# Patient Record
Sex: MALE | Race: WHITE | Employment: OTHER | URBAN - METROPOLITAN AREA
[De-identification: names, ages, dates, MRNs, and addresses within clinical notes are randomized per-mention and may not be internally consistent; named-entity substitution may affect disease eponyms.]

---

## 2017-05-06 PROBLEM — G45.9 TIA (TRANSIENT ISCHEMIC ATTACK): Status: ACTIVE | Noted: 2017-05-06

## 2019-03-23 ENCOUNTER — APPOINTMENT (OUTPATIENT)
Dept: GENERAL RADIOLOGY | Age: 84
DRG: 481 | End: 2019-03-23
Payer: MEDICARE

## 2019-03-23 ENCOUNTER — HOSPITAL ENCOUNTER (INPATIENT)
Age: 84
LOS: 3 days | Discharge: NURSING, REHAB OR LONG-TERM CARE | DRG: 481 | End: 2019-03-27
Attending: FAMILY MEDICINE | Admitting: INTERNAL MEDICINE
Payer: MEDICARE

## 2019-03-23 DIAGNOSIS — R79.1 SUPRATHERAPEUTIC INR: ICD-10-CM

## 2019-03-23 DIAGNOSIS — F41.9 ANXIETY: ICD-10-CM

## 2019-03-23 DIAGNOSIS — S72.002A CLOSED FRACTURE OF LEFT HIP, INITIAL ENCOUNTER (HCC): Primary | ICD-10-CM

## 2019-03-23 LAB
A/G RATIO: 1.8 (ref 1.1–2.2)
ALBUMIN SERPL-MCNC: 4.2 G/DL (ref 3.4–5)
ALP BLD-CCNC: 110 U/L (ref 40–129)
ALT SERPL-CCNC: 22 U/L (ref 10–40)
ANION GAP SERPL CALCULATED.3IONS-SCNC: 18 MMOL/L (ref 3–16)
AST SERPL-CCNC: 34 U/L (ref 15–37)
BASOPHILS ABSOLUTE: 0 K/UL (ref 0–0.2)
BASOPHILS RELATIVE PERCENT: 0.7 %
BILIRUB SERPL-MCNC: 0.7 MG/DL (ref 0–1)
BUN BLDV-MCNC: 18 MG/DL (ref 7–20)
CALCIUM SERPL-MCNC: 9.2 MG/DL (ref 8.3–10.6)
CHLORIDE BLD-SCNC: 93 MMOL/L (ref 99–110)
CO2: 24 MMOL/L (ref 21–32)
CREAT SERPL-MCNC: 1.2 MG/DL (ref 0.8–1.3)
EOSINOPHILS ABSOLUTE: 0.3 K/UL (ref 0–0.6)
EOSINOPHILS RELATIVE PERCENT: 3.7 %
GFR AFRICAN AMERICAN: >60
GFR NON-AFRICAN AMERICAN: 58
GLOBULIN: 2.3 G/DL
GLUCOSE BLD-MCNC: 122 MG/DL (ref 70–99)
HCT VFR BLD CALC: 34.5 % (ref 40.5–52.5)
HEMOGLOBIN: 11.8 G/DL (ref 13.5–17.5)
INR BLD: 3.6 (ref 0.86–1.14)
LYMPHOCYTES ABSOLUTE: 0.6 K/UL (ref 1–5.1)
LYMPHOCYTES RELATIVE PERCENT: 8.9 %
MCH RBC QN AUTO: 30.6 PG (ref 26–34)
MCHC RBC AUTO-ENTMCNC: 34.3 G/DL (ref 31–36)
MCV RBC AUTO: 89.1 FL (ref 80–100)
MONOCYTES ABSOLUTE: 0.5 K/UL (ref 0–1.3)
MONOCYTES RELATIVE PERCENT: 7.5 %
NEUTROPHILS ABSOLUTE: 5.5 K/UL (ref 1.7–7.7)
NEUTROPHILS RELATIVE PERCENT: 79.2 %
PDW BLD-RTO: 14.3 % (ref 12.4–15.4)
PLATELET # BLD: 111 K/UL (ref 135–450)
PMV BLD AUTO: 8.7 FL (ref 5–10.5)
POTASSIUM REFLEX MAGNESIUM: 4.1 MMOL/L (ref 3.5–5.1)
PROTHROMBIN TIME: 41 SEC (ref 9.8–13)
RBC # BLD: 3.88 M/UL (ref 4.2–5.9)
SODIUM BLD-SCNC: 135 MMOL/L (ref 136–145)
TOTAL PROTEIN: 6.5 G/DL (ref 6.4–8.2)
WBC # BLD: 6.9 K/UL (ref 4–11)

## 2019-03-23 PROCEDURE — 86901 BLOOD TYPING SEROLOGIC RH(D): CPT

## 2019-03-23 PROCEDURE — 99285 EMERGENCY DEPT VISIT HI MDM: CPT

## 2019-03-23 PROCEDURE — 85610 PROTHROMBIN TIME: CPT

## 2019-03-23 PROCEDURE — 93005 ELECTROCARDIOGRAM TRACING: CPT | Performed by: PHYSICIAN ASSISTANT

## 2019-03-23 PROCEDURE — 6360000002 HC RX W HCPCS: Performed by: PHYSICIAN ASSISTANT

## 2019-03-23 PROCEDURE — 71045 X-RAY EXAM CHEST 1 VIEW: CPT

## 2019-03-23 PROCEDURE — 73502 X-RAY EXAM HIP UNI 2-3 VIEWS: CPT

## 2019-03-23 PROCEDURE — 96374 THER/PROPH/DIAG INJ IV PUSH: CPT

## 2019-03-23 PROCEDURE — 86850 RBC ANTIBODY SCREEN: CPT

## 2019-03-23 PROCEDURE — 86900 BLOOD TYPING SEROLOGIC ABO: CPT

## 2019-03-23 PROCEDURE — 85025 COMPLETE CBC W/AUTO DIFF WBC: CPT

## 2019-03-23 PROCEDURE — 80053 COMPREHEN METABOLIC PANEL: CPT

## 2019-03-23 RX ORDER — FENTANYL CITRATE 50 UG/ML
25 INJECTION, SOLUTION INTRAMUSCULAR; INTRAVENOUS ONCE
Status: COMPLETED | OUTPATIENT
Start: 2019-03-23 | End: 2019-03-23

## 2019-03-23 RX ADMIN — FENTANYL CITRATE 25 MCG: 50 INJECTION, SOLUTION INTRAMUSCULAR; INTRAVENOUS at 22:25

## 2019-03-23 ASSESSMENT — PAIN DESCRIPTION - ONSET
ONSET: SUDDEN
ONSET: ON-GOING
ONSET: ON-GOING

## 2019-03-23 ASSESSMENT — PAIN DESCRIPTION - PAIN TYPE
TYPE: ACUTE PAIN

## 2019-03-23 ASSESSMENT — PAIN DESCRIPTION - LOCATION
LOCATION: GROIN

## 2019-03-23 ASSESSMENT — PAIN DESCRIPTION - DESCRIPTORS
DESCRIPTORS: DISCOMFORT
DESCRIPTORS: DISCOMFORT;SORE
DESCRIPTORS: ACHING;DISCOMFORT

## 2019-03-23 ASSESSMENT — PAIN - FUNCTIONAL ASSESSMENT
PAIN_FUNCTIONAL_ASSESSMENT: PREVENTS OR INTERFERES SOME ACTIVE ACTIVITIES AND ADLS

## 2019-03-23 ASSESSMENT — PAIN DESCRIPTION - PROGRESSION
CLINICAL_PROGRESSION: GRADUALLY IMPROVING
CLINICAL_PROGRESSION: NOT CHANGED
CLINICAL_PROGRESSION: GRADUALLY WORSENING

## 2019-03-23 ASSESSMENT — PAIN DESCRIPTION - ORIENTATION
ORIENTATION: LEFT

## 2019-03-23 ASSESSMENT — PAIN DESCRIPTION - FREQUENCY
FREQUENCY: CONTINUOUS

## 2019-03-23 ASSESSMENT — PAIN SCALES - GENERAL
PAINLEVEL_OUTOF10: 9
PAINLEVEL_OUTOF10: 10
PAINLEVEL_OUTOF10: 4

## 2019-03-24 PROBLEM — I48.91 ATRIAL FIBRILLATION (HCC): Status: ACTIVE | Noted: 2019-03-24

## 2019-03-24 PROBLEM — E11.9 DMII (DIABETES MELLITUS, TYPE 2) (HCC): Status: ACTIVE | Noted: 2019-03-24

## 2019-03-24 PROBLEM — I10 ESSENTIAL HYPERTENSION: Status: ACTIVE | Noted: 2019-03-24

## 2019-03-24 PROBLEM — S72.002A HIP FRACTURE REQUIRING OPERATIVE REPAIR, LEFT, CLOSED, INITIAL ENCOUNTER (HCC): Status: ACTIVE | Noted: 2019-03-24

## 2019-03-24 PROBLEM — M25.552 HIP PAIN, ACUTE, LEFT: Status: ACTIVE | Noted: 2019-03-24

## 2019-03-24 PROBLEM — E78.5 HYPERLIPIDEMIA: Status: ACTIVE | Noted: 2019-03-24

## 2019-03-24 LAB
ABO/RH: NORMAL
ANION GAP SERPL CALCULATED.3IONS-SCNC: 13 MMOL/L (ref 3–16)
ANTIBODY SCREEN: NORMAL
BASOPHILS ABSOLUTE: 0.1 K/UL (ref 0–0.2)
BASOPHILS RELATIVE PERCENT: 1 %
BUN BLDV-MCNC: 16 MG/DL (ref 7–20)
CALCIUM SERPL-MCNC: 9.1 MG/DL (ref 8.3–10.6)
CHLORIDE BLD-SCNC: 95 MMOL/L (ref 99–110)
CO2: 25 MMOL/L (ref 21–32)
CREAT SERPL-MCNC: 1.1 MG/DL (ref 0.8–1.3)
EOSINOPHILS ABSOLUTE: 0.4 K/UL (ref 0–0.6)
EOSINOPHILS RELATIVE PERCENT: 7.9 %
GFR AFRICAN AMERICAN: >60
GFR NON-AFRICAN AMERICAN: >60
GLUCOSE BLD-MCNC: 117 MG/DL (ref 70–99)
HCT VFR BLD CALC: 34 % (ref 40.5–52.5)
HEMOGLOBIN: 11.8 G/DL (ref 13.5–17.5)
INR BLD: 2.23 (ref 0.86–1.14)
INR BLD: 3.6 (ref 0.86–1.14)
LYMPHOCYTES ABSOLUTE: 0.9 K/UL (ref 1–5.1)
LYMPHOCYTES RELATIVE PERCENT: 15.2 %
MCH RBC QN AUTO: 30.3 PG (ref 26–34)
MCHC RBC AUTO-ENTMCNC: 34.7 G/DL (ref 31–36)
MCV RBC AUTO: 87.4 FL (ref 80–100)
MONOCYTES ABSOLUTE: 0.6 K/UL (ref 0–1.3)
MONOCYTES RELATIVE PERCENT: 9.9 %
NEUTROPHILS ABSOLUTE: 3.7 K/UL (ref 1.7–7.7)
NEUTROPHILS RELATIVE PERCENT: 66 %
PDW BLD-RTO: 14.3 % (ref 12.4–15.4)
PLATELET # BLD: 102 K/UL (ref 135–450)
PMV BLD AUTO: 8.9 FL (ref 5–10.5)
POTASSIUM REFLEX MAGNESIUM: 3.9 MMOL/L (ref 3.5–5.1)
PROTHROMBIN TIME: 25.4 SEC (ref 9.8–13)
PROTHROMBIN TIME: 41 SEC (ref 9.8–13)
RBC # BLD: 3.89 M/UL (ref 4.2–5.9)
SODIUM BLD-SCNC: 133 MMOL/L (ref 136–145)
WBC # BLD: 5.7 K/UL (ref 4–11)

## 2019-03-24 PROCEDURE — 2580000003 HC RX 258: Performed by: ORTHOPAEDIC SURGERY

## 2019-03-24 PROCEDURE — 94760 N-INVAS EAR/PLS OXIMETRY 1: CPT

## 2019-03-24 PROCEDURE — 36415 COLL VENOUS BLD VENIPUNCTURE: CPT

## 2019-03-24 PROCEDURE — 1200000000 HC SEMI PRIVATE

## 2019-03-24 PROCEDURE — 6360000002 HC RX W HCPCS: Performed by: ORTHOPAEDIC SURGERY

## 2019-03-24 PROCEDURE — 96375 TX/PRO/DX INJ NEW DRUG ADDON: CPT

## 2019-03-24 PROCEDURE — 85025 COMPLETE CBC W/AUTO DIFF WBC: CPT

## 2019-03-24 PROCEDURE — 6370000000 HC RX 637 (ALT 250 FOR IP): Performed by: INTERNAL MEDICINE

## 2019-03-24 PROCEDURE — 2580000003 HC RX 258: Performed by: INTERNAL MEDICINE

## 2019-03-24 PROCEDURE — 6360000002 HC RX W HCPCS: Performed by: FAMILY MEDICINE

## 2019-03-24 PROCEDURE — 85610 PROTHROMBIN TIME: CPT

## 2019-03-24 PROCEDURE — 6360000002 HC RX W HCPCS: Performed by: INTERNAL MEDICINE

## 2019-03-24 PROCEDURE — 80048 BASIC METABOLIC PNL TOTAL CA: CPT

## 2019-03-24 RX ORDER — TAMSULOSIN HYDROCHLORIDE 0.4 MG/1
0.4 CAPSULE ORAL DAILY
Status: DISCONTINUED | OUTPATIENT
Start: 2019-03-24 | End: 2019-03-27 | Stop reason: HOSPADM

## 2019-03-24 RX ORDER — FUROSEMIDE 40 MG/1
40 TABLET ORAL DAILY
Status: DISCONTINUED | OUTPATIENT
Start: 2019-03-24 | End: 2019-03-27 | Stop reason: HOSPADM

## 2019-03-24 RX ORDER — MORPHINE SULFATE 2 MG/ML
2 INJECTION, SOLUTION INTRAMUSCULAR; INTRAVENOUS EVERY 4 HOURS PRN
Status: DISCONTINUED | OUTPATIENT
Start: 2019-03-24 | End: 2019-03-25

## 2019-03-24 RX ORDER — SODIUM CHLORIDE 0.9 % (FLUSH) 0.9 %
10 SYRINGE (ML) INJECTION PRN
Status: DISCONTINUED | OUTPATIENT
Start: 2019-03-24 | End: 2019-03-25

## 2019-03-24 RX ORDER — OXYCODONE HYDROCHLORIDE AND ACETAMINOPHEN 5; 325 MG/1; MG/1
2 TABLET ORAL EVERY 4 HOURS PRN
Status: DISCONTINUED | OUTPATIENT
Start: 2019-03-24 | End: 2019-03-25

## 2019-03-24 RX ORDER — ACETAMINOPHEN 325 MG/1
650 TABLET ORAL EVERY 4 HOURS PRN
Status: DISCONTINUED | OUTPATIENT
Start: 2019-03-24 | End: 2019-03-27 | Stop reason: HOSPADM

## 2019-03-24 RX ORDER — CETIRIZINE HYDROCHLORIDE 10 MG/1
10 TABLET ORAL DAILY
Status: DISCONTINUED | OUTPATIENT
Start: 2019-03-24 | End: 2019-03-27 | Stop reason: HOSPADM

## 2019-03-24 RX ORDER — ONDANSETRON 2 MG/ML
4 INJECTION INTRAMUSCULAR; INTRAVENOUS EVERY 4 HOURS PRN
Status: DISCONTINUED | OUTPATIENT
Start: 2019-03-24 | End: 2019-03-27 | Stop reason: HOSPADM

## 2019-03-24 RX ORDER — OXYCODONE HYDROCHLORIDE AND ACETAMINOPHEN 5; 325 MG/1; MG/1
1 TABLET ORAL EVERY 4 HOURS PRN
Status: DISCONTINUED | OUTPATIENT
Start: 2019-03-24 | End: 2019-03-25

## 2019-03-24 RX ORDER — LOSARTAN POTASSIUM 25 MG/1
25 TABLET ORAL DAILY
Status: DISCONTINUED | OUTPATIENT
Start: 2019-03-24 | End: 2019-03-27 | Stop reason: HOSPADM

## 2019-03-24 RX ORDER — ASPIRIN 81 MG/1
81 TABLET, CHEWABLE ORAL DAILY
Status: DISCONTINUED | OUTPATIENT
Start: 2019-03-24 | End: 2019-03-25

## 2019-03-24 RX ORDER — ALPRAZOLAM 0.25 MG/1
0.25 TABLET ORAL 3 TIMES DAILY
Status: DISCONTINUED | OUTPATIENT
Start: 2019-03-24 | End: 2019-03-25

## 2019-03-24 RX ORDER — LOSARTAN POTASSIUM 25 MG/1
25 TABLET ORAL DAILY
COMMUNITY

## 2019-03-24 RX ORDER — ATORVASTATIN CALCIUM 20 MG/1
20 TABLET, FILM COATED ORAL DAILY
Status: DISCONTINUED | OUTPATIENT
Start: 2019-03-24 | End: 2019-03-27 | Stop reason: HOSPADM

## 2019-03-24 RX ORDER — CARVEDILOL 12.5 MG/1
12.5 TABLET ORAL 2 TIMES DAILY WITH MEALS
Status: DISCONTINUED | OUTPATIENT
Start: 2019-03-24 | End: 2019-03-27 | Stop reason: HOSPADM

## 2019-03-24 RX ORDER — MORPHINE SULFATE 4 MG/ML
4 INJECTION, SOLUTION INTRAMUSCULAR; INTRAVENOUS ONCE
Status: COMPLETED | OUTPATIENT
Start: 2019-03-24 | End: 2019-03-24

## 2019-03-24 RX ORDER — LISINOPRIL 5 MG/1
5 TABLET ORAL DAILY
Status: DISCONTINUED | OUTPATIENT
Start: 2019-03-24 | End: 2019-03-24

## 2019-03-24 RX ORDER — PANTOPRAZOLE SODIUM 40 MG/1
40 TABLET, DELAYED RELEASE ORAL
Status: DISCONTINUED | OUTPATIENT
Start: 2019-03-24 | End: 2019-03-27 | Stop reason: HOSPADM

## 2019-03-24 RX ORDER — SODIUM CHLORIDE 0.9 % (FLUSH) 0.9 %
10 SYRINGE (ML) INJECTION EVERY 12 HOURS SCHEDULED
Status: DISCONTINUED | OUTPATIENT
Start: 2019-03-24 | End: 2019-03-27 | Stop reason: HOSPADM

## 2019-03-24 RX ORDER — HEPARIN SODIUM 5000 [USP'U]/ML
5000 INJECTION, SOLUTION INTRAVENOUS; SUBCUTANEOUS EVERY 8 HOURS SCHEDULED
Status: DISCONTINUED | OUTPATIENT
Start: 2019-03-24 | End: 2019-03-25

## 2019-03-24 RX ADMIN — PHYTONADIONE 10 MG: 10 INJECTION, EMULSION INTRAMUSCULAR; INTRAVENOUS; SUBCUTANEOUS at 19:00

## 2019-03-24 RX ADMIN — PHYTONADIONE 10 MG: 10 INJECTION, EMULSION INTRAMUSCULAR; INTRAVENOUS; SUBCUTANEOUS at 10:36

## 2019-03-24 RX ADMIN — CETIRIZINE HYDROCHLORIDE 10 MG: 10 TABLET, FILM COATED ORAL at 13:52

## 2019-03-24 RX ADMIN — FUROSEMIDE 40 MG: 40 TABLET ORAL at 10:24

## 2019-03-24 RX ADMIN — LOSARTAN POTASSIUM 25 MG: 25 TABLET ORAL at 13:52

## 2019-03-24 RX ADMIN — MORPHINE SULFATE 4 MG: 4 INJECTION INTRAVENOUS at 02:45

## 2019-03-24 RX ADMIN — TAMSULOSIN HYDROCHLORIDE 0.4 MG: 0.4 CAPSULE ORAL at 10:24

## 2019-03-24 RX ADMIN — CARVEDILOL 12.5 MG: 12.5 TABLET, FILM COATED ORAL at 18:41

## 2019-03-24 RX ADMIN — OXYCODONE AND ACETAMINOPHEN 1 TABLET: 5; 325 TABLET ORAL at 18:41

## 2019-03-24 RX ADMIN — HEPARIN SODIUM 5000 UNITS: 5000 INJECTION INTRAVENOUS; SUBCUTANEOUS at 13:53

## 2019-03-24 RX ADMIN — Medication 10 ML: at 10:08

## 2019-03-24 RX ADMIN — OXYCODONE AND ACETAMINOPHEN 1 TABLET: 5; 325 TABLET ORAL at 06:08

## 2019-03-24 RX ADMIN — OXYCODONE AND ACETAMINOPHEN 1 TABLET: 5; 325 TABLET ORAL at 10:24

## 2019-03-24 RX ADMIN — CARVEDILOL 12.5 MG: 12.5 TABLET, FILM COATED ORAL at 10:24

## 2019-03-24 RX ADMIN — PANTOPRAZOLE SODIUM 40 MG: 40 TABLET, DELAYED RELEASE ORAL at 06:03

## 2019-03-24 RX ADMIN — ATORVASTATIN CALCIUM 20 MG: 20 TABLET, FILM COATED ORAL at 10:24

## 2019-03-24 ASSESSMENT — PAIN DESCRIPTION - ORIENTATION
ORIENTATION: LEFT

## 2019-03-24 ASSESSMENT — PAIN SCALES - GENERAL
PAINLEVEL_OUTOF10: 6
PAINLEVEL_OUTOF10: 6
PAINLEVEL_OUTOF10: 2
PAINLEVEL_OUTOF10: 4
PAINLEVEL_OUTOF10: 2
PAINLEVEL_OUTOF10: 6
PAINLEVEL_OUTOF10: 0
PAINLEVEL_OUTOF10: 0
PAINLEVEL_OUTOF10: 2
PAINLEVEL_OUTOF10: 2
PAINLEVEL_OUTOF10: 0

## 2019-03-24 ASSESSMENT — PAIN DESCRIPTION - PAIN TYPE
TYPE: ACUTE PAIN

## 2019-03-24 ASSESSMENT — PAIN DESCRIPTION - ONSET
ONSET: ON-GOING

## 2019-03-24 ASSESSMENT — PAIN DESCRIPTION - DESCRIPTORS
DESCRIPTORS: ACHING
DESCRIPTORS: DISCOMFORT
DESCRIPTORS: ACHING
DESCRIPTORS: DISCOMFORT
DESCRIPTORS: ACHING

## 2019-03-24 ASSESSMENT — PAIN DESCRIPTION - PROGRESSION
CLINICAL_PROGRESSION: GRADUALLY IMPROVING
CLINICAL_PROGRESSION: NOT CHANGED
CLINICAL_PROGRESSION: GRADUALLY IMPROVING
CLINICAL_PROGRESSION: GRADUALLY IMPROVING
CLINICAL_PROGRESSION: GRADUALLY WORSENING

## 2019-03-24 ASSESSMENT — PAIN DESCRIPTION - FREQUENCY
FREQUENCY: CONTINUOUS

## 2019-03-24 ASSESSMENT — PAIN DESCRIPTION - LOCATION
LOCATION: HIP

## 2019-03-24 NOTE — ED NOTES
Fall risk screening completed. Fall risk bracelet applied to patient. Non-skid socks provided and placed on patient. Bed alarm applied and activated. The call light is within the patient's reach, and instructions for use were provided. The bed is in the lowest position with wheels locked. The patient has been advised to notify staff, using the call light, if there is a need to get up or use restroom. The patient verbalized understanding of safety precautions and how to contact staff for assistance.       Santy Lynch, RN  03/23/19 [address] RN  03/24/19 2002

## 2019-03-24 NOTE — ED PROVIDER NOTES
[address]  eMERGENCY dEPARTMENT eNCOUnter      Pt Name: Anais Figueroa  MRN: [id number]  [place] 1935  Date of evaluation: 3/23/2019  Provider: Deni Dale [facility]       Chief Complaint   Patient presents with    Fall     slip on wet grass    Groin Pain     left groin pain, slipped on wet grass         HISTORY OF PRESENT ILLNESS  (Location/Symptom, Timing/Onset, Context/Setting, Quality, Duration, Modifying Factors, Severity.)   Anais Figueroa is a 81 y.o. male who presents to the emergency department with a complaint of left hip pain from a fall. Patient says he was at a car repair establishment this afternoon when he slipped and fell onto the pavement onto his left hip. He says there was immediate pain in the hip. He says he also scraped his left hand and his left knee, but he does not have pain in the hand or knee anymore. He says he was able to get back up and into his car, and he drove his car back home, even climbed steps to get back into his condominium, but only with a great deal of pain in the left hip area. He says he realized he couldn't walk around so he called for an ambulance at that point. He says the pain is located mostly in the anterior left hip but it also seems to be migrating around the area. Denies any back pain or neck pain. Denies any prior history of surgery or fracture in the affected area. Denies any numbness. Says he is on blood thinning medication for her heart condition. Nursing Notes were reviewed and I agree. REVIEW OF SYSTEMS    (2-9 systems for level 4, 10 or more for level 5)     Constitutional:  Negative for fever, chills. Respiratory:  Negative for cough, shortness of breath. Cardiovascular:  Negative for chest pain, palpitations. Gastrointestinal:  Negative for nausea, vomiting, abdominal pain. Genitourinary:  Negative for dysuria, hematuria, flank pain, and pelvic pain.    Musculoskeletal:  Positive for left hip pain, abrasions left hand and knee. Negative for myalgias, neck pain and neck stiffness. Neurological:  Negative for dizziness, weakness, light-headedness, numbness and headaches. Except as noted above the remainder of the review of systems was reviewed and negative. PAST MEDICAL HISTORY         Diagnosis Date    At[facility] fibrillation ([facility]     Diabetes mellitus ([facility]     unsure if diarbetic per patient    Hypertension     MI (mitral incompetence)        SURGICAL HISTORY           Procedure Laterality Date    BACK SURGERY      CARDIAC DEFIBRILLATOR PLACEMENT      CORONARY ANGIOPLASTY WITH STENT PLACEMENT      HERNIA REPAIR      JOINT REPLACEMENT Bilateral     PACEMAKER INSERTION         CURRENT MEDICATIONS       Previous Medications    ALPRAZOLAM (XANAX) 0.25 MG TABLET    Take 0.25 mg by mouth 3 times daily    ASPIRIN 81 MG CHEWABLE TABLET    Take 1 tablet by mouth daily    ATORVASTATIN (LIPITOR) 20 MG TABLET    Take 1 tablet by mouth daily    CARVEDILOL (COREG) 12.5 MG TABLET    Take 1 tablet by mouth 2 times daily    FERROUS SULFATE 325 (65 FE) MG TABLET    Take 325 mg by mouth daily    FUROSEMIDE (LASIX) 40 MG TABLET    Take 1 tablet by mouth daily    LEVOCETIRIZINE (XYZAL) 5 MG TABLET    Take 5 mg by mouth daily    LISINOPRIL (PRINIVIL;ZESTRIL) 5 MG TABLET    Take 1 tablet by mouth daily    NABUMETONE (RELAFEN) 750 MG TABLET    Take 750 mg by mouth 2 times daily    OMEPRAZOLE (PRILOSEC) 20 MG DELAYED RELEASE CAPSULE    Take 20 mg by mouth daily    OXYCODONE-ACETAMINOPHEN (PERCOCET) 5-325 MG PER TABLET    Take 1 tablet by mouth every 4 hours as needed for Pain . TAMSULOSIN (FLOMAX) 0.4 MG CAPSULE    Take 0.4 mg by mouth daily    WARFARIN (COUMADIN) 2.5 MG TABLET    Take 1 tablet by mouth 2 times daily    WARFARIN (COUMADIN) 7.5 MG TABLET    Take 7.5 mg tonight and then take your regularly scheduled dose starting tomorrow.  Have your INR checked Monday and follow up with your cardiologist.       ALLERGIES     Ambien [zolpidem]    FAMILY HISTORY     History reviewed. No pertinent family history. No family status information on file. SOCIAL HISTORY      reports that he has never smoked. He has never used smokeless tobacco. He reports that he does not drink alcohol or use drugs. PHYSICAL EXAM    (up to 7 for level 4, 8 or more for level 5)     ED Triage Vitals   BP Temp Temp Source Pulse Resp SpO2 Height Weight   03/23/19 2134 03/23/19 2131 03/23/19 2131 03/23/19 2131 03/23/19 2131 03/23/19 2131 03/23/19 2208 03/23/19 2131   (!) 156/84 98.3 °F (36.8 °C) Oral 76 18 98 % 5' 6\" (1.676 m) 181 lb 14.1 oz (82.5 kg)       Constitutional:  Appearing well-developed and well-nourished. No distress. HENT:  Normocephalic and atraumatic. Cardiovascular:  Normal rate, regular rhythm, normal heart sounds and intact distal pulses. Pulmonary/Chest:  Effort normal and breath sounds normal. No respiratory distress. Musculoskeletal:  Tenderness to palpation of the anterior left hip, and pain reported with any attempted passive range of motion to the hip. Negative for lumbar spine tenderness or cervical spine tenderness, with good range of motion to the neck. Superficial abrasions noted to the palmar left hand and the anterior left knee, with good range of motion and no bony tenderness to these joints. No edema exhibited. Sensation to light touch grossly intact and capillary refill <3 seconds in the left lower extremity. Neurological:  Oriented to person, place, and time. No cranial nerve deficit. Skin:  Skin is warm and dry. Not diaphoretic. Psychiatric:  Normal mood, affect, behavior, judgment and thought content. DIAGNOSTIC RESULTS     RADIOLOGY:     Interpretation per the Radiologist below, if available at the time of this note:    XR CHEST 1 VW   Final Result   1. Stable moderate elevation of the right hemidiaphragm. Right basilar   atelectasis.    2. No acute focal airspace consolidation or pleural effusions. 3. Mild cardiomegaly. XR HIP LEFT (2-3 VIEWS)   Final Result   Acute subcapital left femoral neck fracture with foreshortening of the left   femoral neck and cortical offset. LABS:  Labs Reviewed   CBC WITH AUTO DIFFERENTIAL - Abnormal; Notable for the following components:       Result Value    RBC 3.88 (*)     Hemoglobin 11.8 (*)     Hematocrit 34.5 (*)     Platelets 717 (*)     Lymphocytes # 0.6 (*)     All other components within normal limits    Narrative:     Performed at:  [address] [facility] [address]   Phone [phone]   COMPREHENSIVE METABOLIC PANEL W/ REFLEX TO MG FOR LOW K - Abnormal; Notable for the following components:    Sodium 135 (*)     Chloride 93 (*)     Anion Gap 18 (*)     Glucose 122 (*)     GFR Non- 58 (*)     All other components within normal limits    Narrative:     Performed at:  [address] [facility] [address]   Phone [phone]   PROTIME-INR - Abnormal; Notable for the following components:    Protime 41.0 (*)     INR 3.60 (*)     All other components within normal limits    Narrative:     Performed at:  [address] [facility] [address]   Phone [phone]   TYPE AND SCREEN       All other labs were within normal range or not returned as of this dictation.     EMERGENCY DEPARTMENT COURSE and DIFFERENTIAL DIAGNOSIS/MDM:   Vitals:    Vitals:    03/23/19 2131 03/23/19 2134 03/23/19 2208   BP:  (!) 156/84    Pulse: 76     Resp: 18     Temp: 98.3 °F (36.8 °C)     TempSrc: Oral     SpO2: 98%     Weight: 181 lb 14.1 oz (82.5 kg)     Height:   5' 6\" (1.676 m)       The patient's condition in the ED was good, the patient was afebrile and nontoxic in appearance, and the patient's physical exam was unremarkable other than for the findings noted above. Hip x-ray confirmed a left femoral neck fracture. Neurovascular status of the affected extremity was good. Patient was given pain medication in the ED. Blood test results show the patient's INR to be 3.6. I consulted the orthopedic specialist on-call, Dr. Radha Nayak, who advised that the patient likely would go to surgery the day after tomorrow, and his warfarin should be held until then. I then consulted the hospitalist, who agreed to accept and admit the patient. ED attending physician Dr. Jessica Dan evaluated the patient personally and agrees with this plan of care. PROCEDURES:  None    FINAL IMPRESSION      1. Closed fracture of left hip, initial encounter ([facility]    2. Supratherapeutic INR          DISPOSITION/PLAN   DISPOSITION Decision To Admit 03/23/2019 11:13:32 PM      PATIENT REFERRED TO:  No follow-up provider specified.     DISCHARGE MEDICATIONS:  New Prescriptions    No medications on file       (Please note that portions of this note were completed with a voice recognition program.  Efforts were made to edit the dictations but occasionally words are mis-transcribed.)    Jacob Pérez, [address] [place] [address]  03/23/19 [id number]

## 2019-03-24 NOTE — ED NOTES
Report called to 3W- RN Gisela. Handoff for pt pain score 2/10 after IV pain meds and repositioning. Pt did not verbalized a need for additional pain management.        Rossana Brown RN  03/24/19 [id number]

## 2019-03-24 NOTE — CONSULTS
PATIENT NAME:                     Jeremy Delgado  YOB: 1935   MEDICAL RECORD#         [id number]  SURGEON:                 Irvin Quintero      CHIEF COMPLAINT: left hip pain. HISTORY OF PRESENT ILLNESS: Mr. Luna Couko is a  81 y.o. male who reportedly slipped in a parking lot. He was having minimal left hip pain and drove home. He then presented to [facility] ER due to weight bearing pain. He was first seen and evaluated in the emergency room , where he was x-rayed and Orthopedics was consulted. The pain is sharp and not radiating. No other complaints. He lives at home with his wife. PAST MEDICAL HISTORY:   Past Medical History:   Diagnosis Date    Atrial fibrillation ([facility]     Diabetes mellitus ([facility]     unsure if diarbetic per patient    Hypertension     MI (mitral incompetence)         MEDICATIONS: The patient's medication reconciliation form was extensively reviewed and noted.      ALLERGIES:   Allergies   Allergen Reactions    Ambien [Zolpidem]      Rash          SOCIAL HISTORY:   Social History     Socioeconomic History    Marital status:      Spouse name: Not on file    Number of children: Not on file    Years of education: Not on file    Highest education level: Not on file   Occupational History    Not on file   Social Needs    Financial resource strain: Not on file    Food insecurity:     Worry: Not on file     Inability: Not on file    Transportation needs:     Medical: Not on file     Non-medical: Not on file   Tobacco Use    Smoking status: Never Smoker    Smokeless tobacco: Never Used   Substance and Sexual Activity    Alcohol use: No    Drug use: Never    Sexual activity: Not Currently     Partners: Female   Lifestyle    Physical activity:     Days per week: Not on file     Minutes per session: Not on file    Stress: Not on file   Relationships    Social connections:     Talks on phone: Not on file     Gets together: Not on file     Attends [employer] service: Not on file     Active member of club or organization: Not on file     Attends meetings of clubs or organizations: Not on file     Relationship status: Not on file    Intimate partner violence:     Fear of current or ex partner: Not on file     Emotionally abused: Not on file     Physically abused: Not on file     Forced sexual activity: Not on file   Other Topics Concern    Not on file   Social History Narrative    Not on file        PAST SURGICAL HISTORY:   Past Surgical History:   Procedure Laterality Date    [address] Bilateral     PACEMAKER INSERTION          REVIEW OF SYSTEMS: Negative for fever, chills or night sweats. Review of   systems is negative except as mentioned in the history of present illness. FAMILY MEDICAL HISTORY: History reviewed. No pertinent family history. PHYSICAL EXAMINATION:   GENERAL: The patient is alert and oriented x3. VITAL SIGNS: Stable. He is afebrile. EXTREMITIES: The left lower extremity is minimally shortened with slight external rotation. The patient has mild pain with light log rolling of the left lower   extremity. The patient is neurovascularly intact in both lower extremities. Examination of the right upper extremity is unremarkable. Examination of the   left upper extremity is unremarkable. Examination of the contralateral lower   extremity is unremarkable. Examination of the skin reveals no lesions or ulcerations in the extremities. X-rays: An AP pelvis and lateral of the hip were reviewed. There is evidence of minimally displaced left femoral neck fracture.        Labs:   Lab Results   Component Value Date    INR 3.60 (H) 03/24/2019    INR 3.60 (H) 03/23/2019    INR 1.75 (H) 05/06/2017    PROTIME 41.0 (H) 03/24/2019    PROTIME 41.0 (H) 03/23/2019    PROTIME 19.8 (H) 05/06/2017     Lab Results

## 2019-03-24 NOTE — H&P
mg tonight and then take your regularly scheduled dose starting tomorrow. Have your INR checked Monday and follow up with your cardiologist. 5/6/17   Adelina Cunha, DO   aspirin 81 MG chewable tablet Take 1 tablet by mouth daily 5/6/17   Adelina Hudson, DO   atorvastatin (LIPITOR) 20 MG tablet Take 1 tablet by mouth daily 5/7/17   Adelina Hudson, DO   ALPRAZolam (XANAX) 0.25 MG tablet Take 0.25 mg by mouth 3 times daily    Historical Provider, MD   tamsulosin (FLOMAX) 0.4 MG capsule Take 0.4 mg by mouth daily 10/7/12   Historical Provider, MD   levocetirizine (XYZAL) 5 MG tablet Take 5 mg by mouth daily    Historical Provider, MD   omeprazole (PRILOSEC) 20 MG delayed release capsule Take 20 mg by mouth daily    Historical Provider, MD   warfarin (COUMADIN) 2.5 MG tablet Take 1 tablet by mouth 2 times daily 4/25/17   Historical Provider, MD   carvedilol (COREG) 12.5 MG tablet Take 1 tablet by mouth 2 times daily 3/8/17   Historical Provider, MD   ferrous sulfate 325 (65 FE) MG tablet Take 325 mg by mouth daily    Historical Provider, MD   nabumetone (RELAFEN) 750 MG tablet Take 750 mg by mouth 2 times daily    Historical Provider, MD   lisinopril (PRINIVIL;ZESTRIL) 5 MG tablet Take 1 tablet by mouth daily 1/30/17   Historical Provider, MD   furosemide (LASIX) 40 MG tablet Take 1 tablet by mouth daily 11/25/16   Historical Provider, MD   oxyCODONE-acetaminophen (PERCOCET) 5-325 MG per tablet Take 1 tablet by mouth every 4 hours as needed for Pain . Historical Provider, MD       Family History:   Family history is negative for accelerated coronary artery disease, diabetes or malignancies. Social History:   TOBACCO:   reports that he has never smoked. He has never used smokeless tobacco.  ETOH:   reports that he does not drink alcohol.   OCCUPATION:      REVIEW OF SYSTEMS:  A full review of systems was performed and is negative except for that which appears in the HPI    Physical Exam:    Vitals: /60   Pulse 79 Temp 98 °F (36.7 °C) (Oral)   Resp 17   Ht 5' 6\" (1.676 m)   Wt 181 lb 14.1 oz (82.5 kg)   SpO2 94%   BMI 29.36 kg/m²   General appearance: WD/WN 81y.o. year-old  male who is alert, appears stated age and is cooperative  HEENT: Head: Normocephalic, no lesions, without obvious abnormality. Eye: Normal external eye, conjunctiva, lids cornea, RAYMOND. Ears: Normal external ears. Non-tender. Nose: Normal external nose, mucus membranes and septum. Pharynx: Dental Hygiene adequate. Normal buccal mucosa. Normal pharynx. Neck: no adenopathy, no carotid bruit, no JVD, supple, symmetrical, trachea midline and thyroid not enlarged, symmetric, no tenderness/mass/nodules  Lungs: clear to auscultation bilaterally and no use of accessory muscles. Heart: regular rate and rhythm, S1, S2 normal, no murmur, click, rub or gallop and normal apical impulse  Abdomen: soft, non-tender; bowel sounds normal; no masses,  no organomegaly  Extremities: legft hip TTP, otherwise extremities atraumatic, no cyanosis or edema and Homans sign is negative, no sign of DVT. Capillary Refill: Acceptable < 3 seconds  Peripheral Pulses: +3 easily felt, not easily obliterated with pressures  Skin: Skin color, texture, turgor normal. No rashes or lesions on exposed skin  Neurologic: Neurovascularly intact without any focal sensory/motor deficits. Cranial nerves: II-XII intact, grossly non-focal. Gait was not tested. Psychiatric: Alert and oriented, thought content appropriate, normal insight    CBC:   Recent Labs     03/23/19  2241   WBC 6.9   HGB 11.8*   *     BMP:    Recent Labs     03/23/19  2241   *   K 4.1   CL 93*   CO2 24   BUN 18   CREATININE 1.2   GLUCOSE 122*     Troponin: No results for input(s): TROPONINI in the last 72 hours.   PT/INR:  No results found for: PTINR  U/A:    Lab Results   Component Value Date    LEUKOCYTESUR LARGE 10/17/2015    RBCUA 6 10/17/2015    SPECGRAV 1.010 10/17/2015    UROBILINOGEN 0.2 FINDINGS: Left-sided subclavian approach cardiac pacemaker device distal lead tip stable in position. Mild cardiomegaly. Cardiac and mediastinal contours remain unchanged. Trachea midline. Atherosclerotic calcification of the thoracic aorta. No pneumothorax. No acute focal airspace consolidation or pleural effusions. Stable moderate elevation of the right hemidiaphragm. Right basilar atelectasis. Visualized osseous structures remain unchanged. No mediastinal shift. No free air below the diaphragm. 1. Stable moderate elevation of the right hemidiaphragm. Right basilar atelectasis. 2. No acute focal airspace consolidation or pleural effusions. 3. Mild cardiomegaly. Assessment:   Principal Problem:    Hip fracture requiring operative repair, left, closed, initial encounter [facility])  Active Problems:    Hip pain, acute, left    Essential hypertension    Hyperlipidemia    DMII (diabetes mellitus, type 2) (HCC)    Atrial fibrillation ([facility]  Resolved Problems:    * No resolved hospital problems. *      Plan:       Hip fracture requiring operative repair, left, closed, initial encounter ([facility] - secondary to a mechanical fall. Orthopedic Surgery has been consulted, and I anticipate need for surgery. Pt has not been made NPO after midnight to facilitate surgery as the ER states that surgery will be on Monday per Ortho. There are no medical barriers to surgery if needed, and patient is a good surgical risk. Left hip pain - IV Morphine will be provided prn pain. Atrial fibrillation (HCC) - rate contolled; Continue Coreg; Coumadin held and Heparin started. Monitor PT/INR daily    Diabetes mellitus II - Controlled without need for aggressive monitoring or intervention    Essential (primary) hypertension - continue home meds and monitor blood pressure    Hyperlipidemia - No current evidence of Rhabdomyolysis or other adverse effects.  Continue statin therapy while in the hospital          DVT Prophylaxis: Heparin  Diet: No diet orders on file  Code Status: Prior  (Advanced care planning has been discussed with patient and/or responsible family member and is reflected in the code status. Further orders associated with this have been entered if appropriate)    Disposition: Anticipate that patient will remain in the hospital for 2 to 3+ days depending on further evaluation and clinical course.      Saba Woodward MD

## 2019-03-24 NOTE — PROGRESS NOTES
Pt admitted to 3W from ED. Pt oriented to unit and POC. Pt VSS, afebrile. A&O, treated for pain with prn meds. Bed low, call light in reach, will monitor.

## 2019-03-24 NOTE — PROGRESS NOTES
Patient in bed with eyes closed. No signs of distress. Respirations easy and even. No evidence of pain at this time. Pain has been tolerable during shift. VSS. Patient uses urinal to void. Able to make needs known. Belongings within reach. Chair alarm on.

## 2019-03-24 NOTE — PROGRESS NOTES
Patient resting in bed watching TV. Alert and oriented. Pain 5 out of 10. Breakfast eaten. Patient unsure of home medications. Will call daughter to verify home meds. Call light and belongings within reach. Bed alarm on.

## 2019-03-24 NOTE — PROGRESS NOTES
4 Eyes Admission Assessment     I agree as the admission nurse that 2 RN's have performed a thorough Head to Toe Skin Assessment on the patient. ALL assessment sites listed below have been assessed on admission. Areas assessed by both nurses: Jennifer Pluck  [x]   Head, Face, and Ears   [x]   Shoulders, Back, and Chest  [x]   Arms, Elbows, and Hands   [x]   Coccyx, Sacrum, and Ischum  [x]   Legs, Feet, and Heels        Does the Patient have Skin Breakdown?   No         Waldemar Prevention initiated:  No   Wound Care Orders initiated:  No      [facility] nurse consulted for Pressure Injury (Stage 3,4, Unstageable, DTI, NWPT, and Complex wounds):  No      Nurse 1 eSignature: Electronically signed by Jeri Hernandez RN on 3/24/19 at 10:42 AM    **SHARE this note so that the co-signing nurse is able to place an eSignature**    Nurse 2 eSignature: Electronically signed by Nuvia Zimmerman RN on 3/24/19 at 6:16 PM

## 2019-03-24 NOTE — ED NOTES
Pt placed in hospital gown, but refused to remove his undershirt stating \"no I am cold\". NIKOLAS Reynoso made aware, and stated its ok for pt to keep top on.       Carolina Hargrove RN  03/24/19 3742

## 2019-03-25 ENCOUNTER — APPOINTMENT (OUTPATIENT)
Dept: GENERAL RADIOLOGY | Age: 84
DRG: 481 | End: 2019-03-25
Payer: MEDICARE

## 2019-03-25 ENCOUNTER — ANESTHESIA (OUTPATIENT)
Dept: OPERATING ROOM | Age: 84
DRG: 481 | End: 2019-03-25
Payer: MEDICARE

## 2019-03-25 ENCOUNTER — ANESTHESIA EVENT (OUTPATIENT)
Dept: OPERATING ROOM | Age: 84
DRG: 481 | End: 2019-03-25
Payer: MEDICARE

## 2019-03-25 VITALS
DIASTOLIC BLOOD PRESSURE: 63 MMHG | SYSTOLIC BLOOD PRESSURE: 101 MMHG | OXYGEN SATURATION: 100 % | RESPIRATION RATE: 4 BRPM

## 2019-03-25 LAB
ANION GAP SERPL CALCULATED.3IONS-SCNC: 12 MMOL/L (ref 3–16)
BASOPHILS ABSOLUTE: 0 K/UL (ref 0–0.2)
BASOPHILS RELATIVE PERCENT: 0.6 %
BUN BLDV-MCNC: 16 MG/DL (ref 7–20)
CALCIUM SERPL-MCNC: 8.5 MG/DL (ref 8.3–10.6)
CHLORIDE BLD-SCNC: 95 MMOL/L (ref 99–110)
CO2: 25 MMOL/L (ref 21–32)
CREAT SERPL-MCNC: 1.3 MG/DL (ref 0.8–1.3)
EKG ATRIAL RATE: 79 BPM
EKG DIAGNOSIS: NORMAL
EKG P AXIS: 47 DEGREES
EKG P-R INTERVAL: 192 MS
EKG Q-T INTERVAL: 384 MS
EKG QRS DURATION: 90 MS
EKG QTC CALCULATION (BAZETT): 440 MS
EKG R AXIS: -63 DEGREES
EKG T AXIS: -30 DEGREES
EKG VENTRICULAR RATE: 79 BPM
EOSINOPHILS ABSOLUTE: 0.4 K/UL (ref 0–0.6)
EOSINOPHILS RELATIVE PERCENT: 6.9 %
GFR AFRICAN AMERICAN: >60
GFR NON-AFRICAN AMERICAN: 53
GLUCOSE BLD-MCNC: 115 MG/DL (ref 70–99)
GLUCOSE BLD-MCNC: 121 MG/DL (ref 70–99)
HCT VFR BLD CALC: 34 % (ref 40.5–52.5)
HEMOGLOBIN: 11.8 G/DL (ref 13.5–17.5)
INR BLD: 1.34 (ref 0.86–1.14)
LYMPHOCYTES ABSOLUTE: 0.8 K/UL (ref 1–5.1)
LYMPHOCYTES RELATIVE PERCENT: 15 %
MCH RBC QN AUTO: 30.7 PG (ref 26–34)
MCHC RBC AUTO-ENTMCNC: 34.6 G/DL (ref 31–36)
MCV RBC AUTO: 88.6 FL (ref 80–100)
MONOCYTES ABSOLUTE: 0.9 K/UL (ref 0–1.3)
MONOCYTES RELATIVE PERCENT: 16.3 %
NEUTROPHILS ABSOLUTE: 3.5 K/UL (ref 1.7–7.7)
NEUTROPHILS RELATIVE PERCENT: 61.2 %
PDW BLD-RTO: 14.3 % (ref 12.4–15.4)
PERFORMED ON: ABNORMAL
PLATELET # BLD: 91 K/UL (ref 135–450)
PMV BLD AUTO: 8.3 FL (ref 5–10.5)
POTASSIUM REFLEX MAGNESIUM: 3.9 MMOL/L (ref 3.5–5.1)
PROTHROMBIN TIME: 15.3 SEC (ref 9.8–13)
RBC # BLD: 3.84 M/UL (ref 4.2–5.9)
SODIUM BLD-SCNC: 132 MMOL/L (ref 136–145)
WBC # BLD: 5.7 K/UL (ref 4–11)

## 2019-03-25 PROCEDURE — 6360000002 HC RX W HCPCS: Performed by: NURSE ANESTHETIST, CERTIFIED REGISTERED

## 2019-03-25 PROCEDURE — 97530 THERAPEUTIC ACTIVITIES: CPT

## 2019-03-25 PROCEDURE — 3600000004 HC SURGERY LEVEL 4 BASE: Performed by: ORTHOPAEDIC SURGERY

## 2019-03-25 PROCEDURE — 6360000002 HC RX W HCPCS: Performed by: NURSE PRACTITIONER

## 2019-03-25 PROCEDURE — 2580000003 HC RX 258: Performed by: NURSE ANESTHETIST, CERTIFIED REGISTERED

## 2019-03-25 PROCEDURE — 6370000000 HC RX 637 (ALT 250 FOR IP): Performed by: ORTHOPAEDIC SURGERY

## 2019-03-25 PROCEDURE — 80048 BASIC METABOLIC PNL TOTAL CA: CPT

## 2019-03-25 PROCEDURE — 97116 GAIT TRAINING THERAPY: CPT

## 2019-03-25 PROCEDURE — 2709999900 HC NON-CHARGEABLE SUPPLY: Performed by: ORTHOPAEDIC SURGERY

## 2019-03-25 PROCEDURE — 7100000000 HC PACU RECOVERY - FIRST 15 MIN: Performed by: ORTHOPAEDIC SURGERY

## 2019-03-25 PROCEDURE — 85610 PROTHROMBIN TIME: CPT

## 2019-03-25 PROCEDURE — 3700000000 HC ANESTHESIA ATTENDED CARE: Performed by: ORTHOPAEDIC SURGERY

## 2019-03-25 PROCEDURE — 85025 COMPLETE CBC W/AUTO DIFF WBC: CPT

## 2019-03-25 PROCEDURE — 2700000000 HC OXYGEN THERAPY PER DAY

## 2019-03-25 PROCEDURE — 1200000000 HC SEMI PRIVATE

## 2019-03-25 PROCEDURE — 6360000002 HC RX W HCPCS: Performed by: ANESTHESIOLOGY

## 2019-03-25 PROCEDURE — 2500000003 HC RX 250 WO HCPCS

## 2019-03-25 PROCEDURE — 93010 ELECTROCARDIOGRAM REPORT: CPT | Performed by: INTERNAL MEDICINE

## 2019-03-25 PROCEDURE — 36415 COLL VENOUS BLD VENIPUNCTURE: CPT

## 2019-03-25 PROCEDURE — 6370000000 HC RX 637 (ALT 250 FOR IP): Performed by: INTERNAL MEDICINE

## 2019-03-25 PROCEDURE — 0QS734Z REPOSITION LEFT UPPER FEMUR WITH INTERNAL FIXATION DEVICE, PERCUTANEOUS APPROACH: ICD-10-PCS | Performed by: ORTHOPAEDIC SURGERY

## 2019-03-25 PROCEDURE — 3209999900 FLUORO FOR SURGICAL PROCEDURES

## 2019-03-25 PROCEDURE — 7100000001 HC PACU RECOVERY - ADDTL 15 MIN: Performed by: ORTHOPAEDIC SURGERY

## 2019-03-25 PROCEDURE — 2580000003 HC RX 258: Performed by: ORTHOPAEDIC SURGERY

## 2019-03-25 PROCEDURE — 97162 PT EVAL MOD COMPLEX 30 MIN: CPT

## 2019-03-25 PROCEDURE — 2500000003 HC RX 250 WO HCPCS: Performed by: NURSE ANESTHETIST, CERTIFIED REGISTERED

## 2019-03-25 PROCEDURE — 3700000001 HC ADD 15 MINUTES (ANESTHESIA): Performed by: ORTHOPAEDIC SURGERY

## 2019-03-25 PROCEDURE — 6370000000 HC RX 637 (ALT 250 FOR IP): Performed by: HOSPITALIST

## 2019-03-25 PROCEDURE — 2720000010 HC SURG SUPPLY STERILE: Performed by: ORTHOPAEDIC SURGERY

## 2019-03-25 PROCEDURE — 73502 X-RAY EXAM HIP UNI 2-3 VIEWS: CPT

## 2019-03-25 PROCEDURE — 94664 DEMO&/EVAL PT USE INHALER: CPT

## 2019-03-25 PROCEDURE — C1713 ANCHOR/SCREW BN/BN,TIS/BN: HCPCS | Performed by: ORTHOPAEDIC SURGERY

## 2019-03-25 PROCEDURE — 3600000014 HC SURGERY LEVEL 4 ADDTL 15MIN: Performed by: ORTHOPAEDIC SURGERY

## 2019-03-25 PROCEDURE — 94760 N-INVAS EAR/PLS OXIMETRY 1: CPT

## 2019-03-25 PROCEDURE — 97166 OT EVAL MOD COMPLEX 45 MIN: CPT

## 2019-03-25 PROCEDURE — 6360000002 HC RX W HCPCS: Performed by: ORTHOPAEDIC SURGERY

## 2019-03-25 DEVICE — IMPLANTABLE DEVICE: Type: IMPLANTABLE DEVICE | Site: HIP | Status: FUNCTIONAL

## 2019-03-25 RX ORDER — GLYCOPYRROLATE 0.2 MG/ML
INJECTION INTRAMUSCULAR; INTRAVENOUS PRN
Status: DISCONTINUED | OUTPATIENT
Start: 2019-03-25 | End: 2019-03-25 | Stop reason: SDUPTHER

## 2019-03-25 RX ORDER — LIDOCAINE HYDROCHLORIDE 20 MG/ML
INJECTION, SOLUTION EPIDURAL; INFILTRATION; INTRACAUDAL; PERINEURAL PRN
Status: DISCONTINUED | OUTPATIENT
Start: 2019-03-25 | End: 2019-03-25 | Stop reason: SDUPTHER

## 2019-03-25 RX ORDER — FENTANYL CITRATE 50 UG/ML
INJECTION, SOLUTION INTRAMUSCULAR; INTRAVENOUS PRN
Status: DISCONTINUED | OUTPATIENT
Start: 2019-03-25 | End: 2019-03-25 | Stop reason: SDUPTHER

## 2019-03-25 RX ORDER — FENTANYL CITRATE 50 UG/ML
25 INJECTION, SOLUTION INTRAMUSCULAR; INTRAVENOUS EVERY 5 MIN PRN
Status: DISCONTINUED | OUTPATIENT
Start: 2019-03-25 | End: 2019-03-25 | Stop reason: HOSPADM

## 2019-03-25 RX ORDER — PROMETHAZINE HYDROCHLORIDE 25 MG/ML
6.25 INJECTION, SOLUTION INTRAMUSCULAR; INTRAVENOUS
Status: DISCONTINUED | OUTPATIENT
Start: 2019-03-25 | End: 2019-03-25 | Stop reason: HOSPADM

## 2019-03-25 RX ORDER — PHENYLEPHRINE HCL IN 0.9% NACL 1 MG/10 ML
SYRINGE (ML) INTRAVENOUS PRN
Status: DISCONTINUED | OUTPATIENT
Start: 2019-03-25 | End: 2019-03-25 | Stop reason: SDUPTHER

## 2019-03-25 RX ORDER — DEXAMETHASONE SODIUM PHOSPHATE 4 MG/ML
INJECTION, SOLUTION INTRA-ARTICULAR; INTRALESIONAL; INTRAMUSCULAR; INTRAVENOUS; SOFT TISSUE PRN
Status: DISCONTINUED | OUTPATIENT
Start: 2019-03-25 | End: 2019-03-25 | Stop reason: SDUPTHER

## 2019-03-25 RX ORDER — ALPRAZOLAM 0.25 MG/1
0.25 TABLET ORAL NIGHTLY PRN
Status: DISCONTINUED | OUTPATIENT
Start: 2019-03-25 | End: 2019-03-27 | Stop reason: HOSPADM

## 2019-03-25 RX ORDER — LABETALOL HYDROCHLORIDE 5 MG/ML
5 INJECTION, SOLUTION INTRAVENOUS EVERY 10 MIN PRN
Status: DISCONTINUED | OUTPATIENT
Start: 2019-03-25 | End: 2019-03-25 | Stop reason: HOSPADM

## 2019-03-25 RX ORDER — DOCUSATE SODIUM 100 MG/1
100 CAPSULE, LIQUID FILLED ORAL 2 TIMES DAILY
Status: DISCONTINUED | OUTPATIENT
Start: 2019-03-25 | End: 2019-03-27 | Stop reason: HOSPADM

## 2019-03-25 RX ORDER — EPHEDRINE SULFATE/0.9% NACL/PF 50 MG/5 ML
SYRINGE (ML) INTRAVENOUS PRN
Status: DISCONTINUED | OUTPATIENT
Start: 2019-03-25 | End: 2019-03-25 | Stop reason: SDUPTHER

## 2019-03-25 RX ORDER — MAGNESIUM HYDROXIDE 1200 MG/15ML
LIQUID ORAL CONTINUOUS PRN
Status: COMPLETED | OUTPATIENT
Start: 2019-03-25 | End: 2019-03-25

## 2019-03-25 RX ORDER — FAMOTIDINE 20 MG/1
20 TABLET, FILM COATED ORAL 2 TIMES DAILY
Status: DISCONTINUED | OUTPATIENT
Start: 2019-03-25 | End: 2019-03-25 | Stop reason: DRUGHIGH

## 2019-03-25 RX ORDER — SODIUM CHLORIDE 9 MG/ML
INJECTION, SOLUTION INTRAVENOUS CONTINUOUS
Status: DISCONTINUED | OUTPATIENT
Start: 2019-03-25 | End: 2019-03-26

## 2019-03-25 RX ORDER — HYDROCODONE BITARTRATE AND ACETAMINOPHEN 5; 325 MG/1; MG/1
1 TABLET ORAL EVERY 4 HOURS PRN
Status: DISCONTINUED | OUTPATIENT
Start: 2019-03-25 | End: 2019-03-27 | Stop reason: HOSPADM

## 2019-03-25 RX ORDER — PROPOFOL 10 MG/ML
INJECTION, EMULSION INTRAVENOUS PRN
Status: DISCONTINUED | OUTPATIENT
Start: 2019-03-25 | End: 2019-03-25 | Stop reason: SDUPTHER

## 2019-03-25 RX ORDER — WARFARIN SODIUM 5 MG/1
5 TABLET ORAL
Status: COMPLETED | OUTPATIENT
Start: 2019-03-25 | End: 2019-03-25

## 2019-03-25 RX ORDER — ONDANSETRON 2 MG/ML
INJECTION INTRAMUSCULAR; INTRAVENOUS PRN
Status: DISCONTINUED | OUTPATIENT
Start: 2019-03-25 | End: 2019-03-25 | Stop reason: SDUPTHER

## 2019-03-25 RX ORDER — SUCCINYLCHOLINE/SOD CL,ISO/PF 200MG/10ML
SYRINGE (ML) INTRAVENOUS PRN
Status: DISCONTINUED | OUTPATIENT
Start: 2019-03-25 | End: 2019-03-25 | Stop reason: SDUPTHER

## 2019-03-25 RX ORDER — SODIUM CHLORIDE 9 MG/ML
INJECTION, SOLUTION INTRAVENOUS CONTINUOUS PRN
Status: DISCONTINUED | OUTPATIENT
Start: 2019-03-25 | End: 2019-03-25 | Stop reason: SDUPTHER

## 2019-03-25 RX ORDER — HYDROCODONE BITARTRATE AND ACETAMINOPHEN 5; 325 MG/1; MG/1
2 TABLET ORAL EVERY 4 HOURS PRN
Status: DISCONTINUED | OUTPATIENT
Start: 2019-03-25 | End: 2019-03-27 | Stop reason: HOSPADM

## 2019-03-25 RX ADMIN — CARVEDILOL 12.5 MG: 12.5 TABLET, FILM COATED ORAL at 17:54

## 2019-03-25 RX ADMIN — CETIRIZINE HYDROCHLORIDE 10 MG: 10 TABLET, FILM COATED ORAL at 09:41

## 2019-03-25 RX ADMIN — ASPIRIN 81 MG 81 MG: 81 TABLET ORAL at 09:42

## 2019-03-25 RX ADMIN — TAMSULOSIN HYDROCHLORIDE 0.4 MG: 0.4 CAPSULE ORAL at 09:42

## 2019-03-25 RX ADMIN — FENTANYL CITRATE 50 MCG: 50 INJECTION INTRAMUSCULAR; INTRAVENOUS at 12:42

## 2019-03-25 RX ADMIN — DOCUSATE SODIUM 100 MG: 100 CAPSULE, LIQUID FILLED ORAL at 21:02

## 2019-03-25 RX ADMIN — Medication 140 MG: at 12:45

## 2019-03-25 RX ADMIN — HYDROCODONE BITARTRATE AND ACETAMINOPHEN 1 TABLET: 5; 325 TABLET ORAL at 17:48

## 2019-03-25 RX ADMIN — FENTANYL CITRATE 50 MCG: 50 INJECTION INTRAMUSCULAR; INTRAVENOUS at 12:45

## 2019-03-25 RX ADMIN — Medication 2 G: at 19:47

## 2019-03-25 RX ADMIN — PROPOFOL 175 MG: 10 INJECTION, EMULSION INTRAVENOUS at 12:45

## 2019-03-25 RX ADMIN — ONDANSETRON 4 MG: 2 INJECTION INTRAMUSCULAR; INTRAVENOUS at 15:03

## 2019-03-25 RX ADMIN — ATORVASTATIN CALCIUM 20 MG: 20 TABLET, FILM COATED ORAL at 09:42

## 2019-03-25 RX ADMIN — SODIUM CHLORIDE: 9 INJECTION, SOLUTION INTRAVENOUS at 12:42

## 2019-03-25 RX ADMIN — Medication 20 MG: at 13:16

## 2019-03-25 RX ADMIN — WARFARIN SODIUM 5 MG: 5 TABLET ORAL at 17:54

## 2019-03-25 RX ADMIN — ONDANSETRON 4 MG: 2 INJECTION INTRAMUSCULAR; INTRAVENOUS at 13:15

## 2019-03-25 RX ADMIN — Medication 200 MCG: at 13:09

## 2019-03-25 RX ADMIN — Medication 200 MCG: at 13:16

## 2019-03-25 RX ADMIN — DEXAMETHASONE SODIUM PHOSPHATE 6 MG: 4 INJECTION, SOLUTION INTRAMUSCULAR; INTRAVENOUS at 13:02

## 2019-03-25 RX ADMIN — HYDROCODONE BITARTRATE AND ACETAMINOPHEN 1 TABLET: 5; 325 TABLET ORAL at 21:51

## 2019-03-25 RX ADMIN — Medication 300 MCG: at 13:11

## 2019-03-25 RX ADMIN — CARVEDILOL 12.5 MG: 12.5 TABLET, FILM COATED ORAL at 09:42

## 2019-03-25 RX ADMIN — LIDOCAINE HYDROCHLORIDE 4 ML: 20 INJECTION, SOLUTION EPIDURAL; INFILTRATION; INTRACAUDAL; PERINEURAL at 12:45

## 2019-03-25 RX ADMIN — FENTANYL CITRATE 25 MCG: 50 INJECTION, SOLUTION INTRAMUSCULAR; INTRAVENOUS at 14:04

## 2019-03-25 RX ADMIN — OXYCODONE AND ACETAMINOPHEN 1 TABLET: 5; 325 TABLET ORAL at 08:43

## 2019-03-25 RX ADMIN — Medication 2 G: at 12:42

## 2019-03-25 RX ADMIN — ALPRAZOLAM 0.25 MG: 0.25 TABLET ORAL at 09:43

## 2019-03-25 RX ADMIN — FUROSEMIDE 40 MG: 40 TABLET ORAL at 09:42

## 2019-03-25 RX ADMIN — GLYCOPYRROLATE 0.1 MG: 0.2 INJECTION, SOLUTION INTRAMUSCULAR; INTRAVENOUS at 12:42

## 2019-03-25 RX ADMIN — SODIUM CHLORIDE: 9 INJECTION, SOLUTION INTRAVENOUS at 15:40

## 2019-03-25 RX ADMIN — ALPRAZOLAM 0.25 MG: 0.25 TABLET ORAL at 21:51

## 2019-03-25 RX ADMIN — PANTOPRAZOLE SODIUM 40 MG: 40 TABLET, DELAYED RELEASE ORAL at 09:42

## 2019-03-25 RX ADMIN — HYDROCODONE BITARTRATE AND ACETAMINOPHEN 1 TABLET: 5; 325 TABLET ORAL at 16:45

## 2019-03-25 RX ADMIN — Medication 20 MG: at 13:14

## 2019-03-25 RX ADMIN — LOSARTAN POTASSIUM 25 MG: 25 TABLET ORAL at 09:42

## 2019-03-25 RX ADMIN — Medication 10 MG: at 13:18

## 2019-03-25 ASSESSMENT — PAIN DESCRIPTION - PAIN TYPE
TYPE: SURGICAL PAIN
TYPE: ACUTE PAIN
TYPE: SURGICAL PAIN
TYPE: ACUTE PAIN
TYPE: SURGICAL PAIN
TYPE: SURGICAL PAIN
TYPE: ACUTE PAIN

## 2019-03-25 ASSESSMENT — PULMONARY FUNCTION TESTS
PIF_VALUE: 16
PIF_VALUE: 17
PIF_VALUE: 17
PIF_VALUE: 1
PIF_VALUE: 1
PIF_VALUE: 18
PIF_VALUE: 18
PIF_VALUE: 16
PIF_VALUE: 18
PIF_VALUE: 17
PIF_VALUE: 10
PIF_VALUE: 18
PIF_VALUE: 23
PIF_VALUE: 12
PIF_VALUE: 13
PIF_VALUE: 10
PIF_VALUE: 16
PIF_VALUE: 16
PIF_VALUE: 18
PIF_VALUE: 17
PIF_VALUE: 13
PIF_VALUE: 12
PIF_VALUE: 17
PIF_VALUE: 17
PIF_VALUE: 22
PIF_VALUE: 18
PIF_VALUE: 16
PIF_VALUE: 1
PIF_VALUE: 15
PIF_VALUE: 17
PIF_VALUE: 16
PIF_VALUE: 17
PIF_VALUE: 1
PIF_VALUE: 14
PIF_VALUE: 18
PIF_VALUE: 18
PIF_VALUE: 20
PIF_VALUE: 17
PIF_VALUE: 1
PIF_VALUE: 10
PIF_VALUE: 13
PIF_VALUE: 19
PIF_VALUE: 18
PIF_VALUE: 18
PIF_VALUE: 16
PIF_VALUE: 18
PIF_VALUE: 10
PIF_VALUE: 10

## 2019-03-25 ASSESSMENT — PAIN DESCRIPTION - ONSET
ONSET: ON-GOING

## 2019-03-25 ASSESSMENT — PAIN DESCRIPTION - LOCATION
LOCATION: HIP

## 2019-03-25 ASSESSMENT — PAIN DESCRIPTION - PROGRESSION
CLINICAL_PROGRESSION: GRADUALLY IMPROVING
CLINICAL_PROGRESSION: GRADUALLY WORSENING
CLINICAL_PROGRESSION: GRADUALLY WORSENING
CLINICAL_PROGRESSION: NOT CHANGED
CLINICAL_PROGRESSION: GRADUALLY WORSENING
CLINICAL_PROGRESSION: GRADUALLY IMPROVING
CLINICAL_PROGRESSION: GRADUALLY WORSENING
CLINICAL_PROGRESSION: GRADUALLY IMPROVING

## 2019-03-25 ASSESSMENT — PAIN DESCRIPTION - ORIENTATION
ORIENTATION: LEFT

## 2019-03-25 ASSESSMENT — PAIN SCALES - GENERAL
PAINLEVEL_OUTOF10: 4
PAINLEVEL_OUTOF10: 7
PAINLEVEL_OUTOF10: 6
PAINLEVEL_OUTOF10: 0
PAINLEVEL_OUTOF10: 6
PAINLEVEL_OUTOF10: 3
PAINLEVEL_OUTOF10: 0
PAINLEVEL_OUTOF10: 5
PAINLEVEL_OUTOF10: 0
PAINLEVEL_OUTOF10: 6
PAINLEVEL_OUTOF10: 0
PAINLEVEL_OUTOF10: 3

## 2019-03-25 ASSESSMENT — PAIN - FUNCTIONAL ASSESSMENT
PAIN_FUNCTIONAL_ASSESSMENT: PREVENTS OR INTERFERES SOME ACTIVE ACTIVITIES AND ADLS
PAIN_FUNCTIONAL_ASSESSMENT: PREVENTS OR INTERFERES WITH MANY ACTIVE NOT PASSIVE ACTIVITIES
PAIN_FUNCTIONAL_ASSESSMENT: PREVENTS OR INTERFERES WITH MANY ACTIVE NOT PASSIVE ACTIVITIES
PAIN_FUNCTIONAL_ASSESSMENT: PREVENTS OR INTERFERES SOME ACTIVE ACTIVITIES AND ADLS

## 2019-03-25 ASSESSMENT — PAIN DESCRIPTION - DESCRIPTORS
DESCRIPTORS: BURNING
DESCRIPTORS: ACHING
DESCRIPTORS: BURNING
DESCRIPTORS: CRAMPING;ACHING
DESCRIPTORS: ACHING
DESCRIPTORS: CRAMPING;ACHING
DESCRIPTORS: CRAMPING;ACHING
DESCRIPTORS: ACHING
DESCRIPTORS: ACHING

## 2019-03-25 ASSESSMENT — PAIN DESCRIPTION - FREQUENCY
FREQUENCY: CONTINUOUS

## 2019-03-25 NOTE — PROGRESS NOTES
Hospitalist Progress Note      PCP: Vira Dubon    Date of Admission: 3/23/2019    Chief Complaint: Following a fall, developed L hip pain, unable to walk after driving home. Hospital Course: minimally displaced L fem neck fx on imaging in ED    Subjective:       Medications:  Reviewed    Infusion Medications   Scheduled Medications    ceFAZolin  2 g Intravenous On Call to OR    heparin (porcine)  5,000 Units Subcutaneous 3 times per day    tamsulosin  0.4 mg Oral Daily    pantoprazole  40 mg Oral QAM AC    cetirizine  10 mg Oral Daily    furosemide  40 mg Oral Daily    carvedilol  12.5 mg Oral BID WC    atorvastatin  20 mg Oral Daily    aspirin  81 mg Oral Daily    ALPRAZolam  0.25 mg Oral TID    sodium chloride flush  10 mL Intravenous 2 times per day    losartan  25 mg Oral Daily     PRN Meds: sodium chloride flush, magnesium hydroxide, ondansetron, acetaminophen, morphine, oxyCODONE-acetaminophen **OR** oxyCODONE-acetaminophen      Intake/Output Summary (Last 24 hours) at 3/25/2019 1034  Last data filed at 3/25/2019 0834  Gross per 24 hour   Intake 720 ml   Output 2025 ml   Net -1305 ml       Physical Exam Performed:    BP (!) 171/86   Pulse 74   Temp 99.1 °F (37.3 °C) (Oral)   Resp 16   Ht 5' 6\" (1.676 m)   Wt 173 lb 1 oz (78.5 kg)   SpO2 92%   BMI 27.93 kg/m²     General appearance: No apparent distress, appears stated age and cooperative. HEENT: Pupils equal, round, and reactive to light. Conjunctivae/corneas clear. Neck: Supple, with full range of motion. No jugular venous distention. Trachea midline. Respiratory:  Normal respiratory effort. Clear to auscultation, bilaterally without Rales/Wheezes/Rhonchi. Cardiovascular: Regular rate and rhythm with normal S1/S2 without murmurs, rubs or gallops. Abdomen: Soft, non-tender, non-distended with normal bowel sounds. Musculoskeletal: No clubbing, cyanosis or edema bilaterally.         Neurologic:    grossly non-focal  Psychiatric: Alert and oriented, thought content appropriate, normal insight  Capillary Refill: Brisk,< 3 seconds   Peripheral Pulses: +2 palpable, equal bilaterally       Labs:   Recent Labs     03/23/19 2241 03/24/19  0542 03/25/19  0502   WBC 6.9 5.7 5.7   HGB 11.8* 11.8* 11.8*   HCT 34.5* 34.0* 34.0*   * 102* 91*     Recent Labs     03/23/19 2241 03/24/19  0542 03/25/19  0502   * 133* 132*   K 4.1 3.9 3.9   CL 93* 95* 95*   CO2 24 25 25   BUN 18 16 16   CREATININE 1.2 1.1 1.3   CALCIUM 9.2 9.1 8.5     Recent Labs     03/23/19 2241   AST 34   ALT 22   BILITOT 0.7   ALKPHOS 110     Recent Labs     03/24/19  0542 03/24/19  1605 03/25/19  0502   INR 3.60* 2.23* 1.34*     No results for input(s): CKTOTAL, TROPONINI in the last 72 hours. Urinalysis:      Lab Results   Component Value Date    NITRU Negative 10/17/2015    WBCUA 99 10/17/2015    BACTERIA 2+ 10/14/2015    RBCUA 6 10/17/2015    BLOODU LARGE 10/17/2015    SPECGRAV 1.010 10/17/2015    GLUCOSEU Negative 10/17/2015    GLUCOSEU NEGATIVE 12/06/2011       Radiology:  XR CHEST 1 VW   Final Result   1. Stable moderate elevation of the right hemidiaphragm. Right basilar   atelectasis. 2. No acute focal airspace consolidation or pleural effusions. 3. Mild cardiomegaly. XR HIP LEFT (2-3 VIEWS)   Final Result   Acute subcapital left femoral neck fracture with foreshortening of the left   femoral neck and cortical offset.                  Assessment/Plan:    Active Hospital Problems    Diagnosis Date Noted    Hip fracture requiring operative repair, left, closed, initial encounter ([facility] Yanna Christensenes 03/24/2019    Hip pain, acute, left [M25.552] 03/24/2019    Essential hypertension [I10] 03/24/2019    Hyperlipidemia [E78.5] 03/24/2019    DMII (diabetes mellitus, type 2) ([facility] [E11.9] 03/24/2019    Atrial fibrillation ([facility] [I48.91] 03/24/2019       1) Atrial Fib  - asymptomatic, rate controlled  - anticoag reversed, resume post op when ok per Ortho  - pain adequately controlled    2) L fem neck fx  - OR today    3) DM  - corrective ISS, no hypoglycemic episodes    4) HTN  - cpm       DVT Prophylaxis: heparin sub cut  Diet: Diet NPO Effective Now  Code Status: Full Code         Meseret Lucas MD

## 2019-03-25 NOTE — PROGRESS NOTES
Occupational Therapy   Occupational Therapy Initial Assessment  Date: 3/25/2019   Patient Name: Thomas Simons  MRN: [id number]     : 1935    Date of Service: 3/25/2019    Assessment: Pt is 81 y.o. M who presents s/p fall resulting in L hip fracture. Pt is s/p L hip pinning and is WBAT. PTA pt lives with wife in [facility] with [address]. Pt reports independence in self-care, functional mobility and active driving. Pt has assistance for IADL tasks. Currently, pt presents with ROM/strength in [facility] for self-care and transfers. Pt completed bed mobility with min A and sit <> stand transfers with min A and cues for safe hand placement. Pt completed short functional mobility with min A for balance and assistance to navigate RW. Anticipate pt to require min A for ADL needs. Pt is unsafe to return home at this time and would benefit from continued therapy to increase mobility, safety, and ADL abilities. Discharge Recommendations:  3-5 sessions per week     Thomas Simons scored a 17/24 on the AM-PAC ADL Inpatient form. Current research shows that an AM-PAC score of 17 or less is typically not associated with a discharge to the patient's home setting. Based on the patients AM-PAC score and their current ADL deficits, it is recommended that the patient have 3-5 sessions per week of Occupational Therapy at d/c to increase the patients independence. Assessment   Performance deficits / Impairments: Decreased strength;Decreased functional mobility ; Decreased endurance;Decreased ADL status; Decreased balance  Assessment: Pt is 81 y.o. M who presents s/p fall resulting in L hip fracture. Pt is s/p L hip pinning and is WBAT. PTA pt lives with wife in [facility] with [address]. Pt reports independence in self-care, functional mobility and active driving. Pt has assistance for IADL tasks. Currently, pt presents with ROM/strength in [facility] for self-care and transfers.  Pt completed bed mobility with min A and sit <> stand transfers Other(bed > recliner )  Assist Level: Minimal assistance  Functional Mobility Comments: Pt completed functional mobility with RW with min A. Pt slightly unsteady and required assist for steering RW. ADL  Additional Comments: PTA pt reports independence in self-care tasks. Anticipate pt to require min A for LB ADLs, SBA for UB ADLs, CGA for toileting, setup for grooming. Tone RUE  RUE Tone: Normotonic  Tone LUE  LUE Tone: Normotonic  Coordination  Movements Are Fluid And Coordinated: Yes        Transfers  Sit to stand: Minimal assistance  Stand to sit: Minimal assistance  Transfer Comments: Min A for sit <> stand from EOB to RW and sit to recliner chair      Cognition  Overall Cognitive Status: WFL        Sensation  Overall Sensation Status: WFL        LUE AROM (degrees)  LUE AROM : WFL  Left Hand AROM (degrees)  Left Hand AROM: WFL  RUE AROM (degrees)  RUE AROM : WFL  Right Hand AROM (degrees)  Right Hand AROM: WFL  LUE Strength  Gross LUE Strength: WFL  RUE Strength  Gross RUE Strength: WFL          Plan   Plan  Times per week: 3-5  Times per day: Daily  Current Treatment Recommendations: Strengthening, Functional Mobility Training, Endurance Training, Balance Training, Safety Education & Training, Equipment Evaluation, Education, & procurement, Patient/Caregiver Education & Training, Self-Care / ADL    AM-[facility] Score    [facility] scored a 17/24 on the AM-[facility] ADL Inpatient form. Current research shows that an AM-PAC score of 17 or less is typically not associated with a discharge to the patient's home setting. Based on the patients AM-PAC score and their current ADL deficits, it is recommended that the patient have 3-5 sessions per week of Occupational Therapy at d/c to increase the patients independence.      AM-PAC Inpatient Daily Activity Raw Score: 17  AM-PAC Inpatient ADL T-Scale Score : 37.26  ADL Inpatient CMS 0-100% Score: 50.11  ADL Inpatient CMS G-Code Modifier : CK    Goals  Short term goals  Time Frame for Short term goals: prior to d/c  Short term goal 1: Pt will complete functional mobility and transfers with SPV  Short term goal 2: Pt will complete bathing with min A  Short term goal 3: Pt will complete dressing with min A  Short term goal 4: Pt will complete toileting with SBA  Short term goal 5: Pt will complete grooming in stance at sink with SBA   Patient Goals   Patient goals : \"to get back to golf\"       Therapy Time   Individual Concurrent Group Co-treatment   Time In       1551   Time Out       1630   Minutes       39   Timed Code Treatment Minutes: 24 Minutes(15 min eval )     If pt is discharged prior to next OT session, this note will serve as the discharge summary.     Gamal Calero, OTR/L #[id number]

## 2019-03-25 NOTE — PROGRESS NOTES
[facility] Orthopedic Surgery   Progress Note      S/P :  SUBJECTIVE  In bed. Family at bedside. Alert and oriented. . Pain is   described in lef thip and with the intensity of moderate. Pain is described as aching. OBJECTIVE              Physical                      VITALS:  BP (!) 171/86   Pulse 74   Temp 99.1 °F (37.3 °C) (Oral)   Resp 16   Ht 5' 6\" (1.676 m)   Wt 173 lb 1 oz (78.5 kg)   SpO2 92%   BMI 27.93 kg/m²                     MUSCULOSKELETAL:  left foot NVI. Wiggles toes to command. Pedal pulses are palpable. Left posterior hip with bruising. Left lateral knee and palm of left hand with small abrasions noted.  No left leg deformity                   NEUROLOGIC:                                  Sensory:  Touch:  Left Lower Extremity:  normal                                                     Data       CBC:   Lab Results   Component Value Date    WBC 5.7 03/25/2019    RBC 3.84 03/25/2019    HGB 11.8 03/25/2019    HCT 34.0 03/25/2019    MCV 88.6 03/25/2019    MCH 30.7 03/25/2019    MCHC 34.6 03/25/2019    RDW 14.3 03/25/2019    PLT 91 03/25/2019    MPV 8.3 03/25/2019        WBC:    Lab Results   Component Value Date    WBC 5.7 03/25/2019        Hemoglobin/Hematocrit:    Lab Results   Component Value Date    HGB 11.8 03/25/2019    HCT 34.0 03/25/2019        PT/INR:    Lab Results   Component Value Date    PROTIME 15.3 03/25/2019    INR 1.34 03/25/2019              Current Inpatient Medications             Current Facility-Administered Medications: heparin (porcine) injection 5,000 Units, 5,000 Units, Subcutaneous, 3 times per day  tamsulosin (FLOMAX) capsule 0.4 mg, 0.4 mg, Oral, Daily  pantoprazole (PROTONIX) tablet 40 mg, 40 mg, Oral, QAM AC  cetirizine (ZYRTEC) tablet 10 mg, 10 mg, Oral, Daily  furosemide (LASIX) tablet 40 mg, 40 mg, Oral, Daily  carvedilol (COREG) tablet 12.5 mg, 12.5 mg, Oral, BID WC  atorvastatin (LIPITOR) tablet 20 mg, 20 mg, Oral, Daily  aspirin chewable tablet 81 mg, 81 mg, Oral, Daily  ALPRAZolam (XANAX) tablet 0.25 mg, 0.25 mg, Oral, TID  sodium chloride flush 0.9 % injection 10 mL, 10 mL, Intravenous, 2 times per day  sodium chloride flush 0.9 % injection 10 mL, 10 mL, Intravenous, PRN  magnesium hydroxide (MILK OF MAGNESIA) 400 MG/5ML suspension 30 mL, 30 mL, Oral, Daily PRN  ondansetron (ZOFRAN) injection 4 mg, 4 mg, Intravenous, Q4H PRN  acetaminophen (TYLENOL) tablet 650 mg, 650 mg, Oral, Q4H PRN  morphine (PF) injection 2 mg, 2 mg, Intravenous, Q4H PRN  oxyCODONE-acetaminophen (PERCOCET) 5-325 MG per tablet 1 tablet, 1 tablet, Oral, Q4H PRN **OR** oxyCODONE-acetaminophen (PERCOCET) 5-325 MG per tablet 2 tablet, 2 tablet, Oral, Q4H PRN  losartan (COZAAR) tablet 25 mg, 25 mg, Oral, Daily    ASSESSMENT AND PLAN      Fall  Left hip pain  Left subcapital hip fx  Plan Left hip pinning today per Dr Calvin Chapman  NPO for OR  Home dose Coumadin on hold.  INR 1.3 today    [facility]  3/25/2019  9:09 AM

## 2019-03-25 NOTE — PROGRESS NOTES
Physical Therapy    Facility/Department: [address] ORTHOPEDICS  Initial Assessment    NAME: Esteban Caceres  : 1935  MRN: [id number]    Date of Service: 3/25/2019    Discharge Recommendations:  Esteban Caceres scored a 16/24 on the AM-PAC short mobility form. Current research shows that an AM-PAC score of 17 or less is typically not associated with a discharge to the patient's home setting. Based on the patients AM-PAC score and their current functional mobility deficits, it is recommended that the patient have 3-5 sessions per week of Physical Therapy at d/c to increase the patients independence. Assessment   Body structures, Functions, Activity limitations: Decreased functional mobility ; Decreased ADL status; Decreased balance  Prognosis: Good  Decision Making: Medium Complexity  REQUIRES PT FOLLOW UP: Yes  Activity Tolerance  Activity Tolerance: Patient Tolerated treatment well       Patient Diagnosis(es): The primary encounter diagnosis was Closed fracture of left hip, initial encounter ([facility] A diagnosis of Supratherapeutic INR was also pertinent to this visit. has a past medical history of Atrial fibrillation ([address] Diabetes mellitus ([address] Hypertension, and MI (mitral incompetence). has a past surgical history that includes Coronary angioplasty with stent; Pacemaker insertion; Cardiac defibrillator placement; joint replacement (Bilateral); hernia repair; back surgery; hip surgery (Left, 2019); and hip pinning (Left, 3/25/2019).     Restrictions  Restrictions/Precautions  Restrictions/Precautions: Fall Risk  Position Activity Restriction  Other position/activity restrictions: WBAT     Subjective  General  Chart Reviewed: Yes  Patient assessed for rehabilitation services?: Yes  Additional Pertinent Hx: here due to a fall with hip fracture -now surgically repaired by hip pinning Left  Response To Previous Treatment: Not applicable  Family / Caregiver Present: Yes  Follows Commands: Within Functional Limits  Subjective  Subjective: alert oriented and agreeablet to PT OT assessments and OOB to the chair   Pain Screening  Patient Currently in Pain: (at rest no pain - with weight bearing modest)  Orientation  Orientation  Overall Orientation Status: Within Functional Limits  Social/Functional History  Social/Functional History  Lives With: Spouse  Type of Home: (Condo)  Home Layout: One level  Home Access: Stairs to enter with rails  Entrance Stairs - Number of Steps: 7 + 7  Entrance Stairs - Rails: Left  Bathroom Shower/Tub: Walk-in shower, Shower chair with back  Bathroom Toilet: Standard  Home Equipment: Rolling walker  ADL Assistance: Independent  Homemaking Assistance: (Wife completes)  Ambulation Assistance: Independent  Transfer Assistance: Independent  Active : Yes  Type of occupation: Works 6 hours 1 day/week   Leisure & Hobbies: Golf    Objective  ROM and strength non-surgical limbs grossly wfl  Motor Control  Gross Motor?: WFL  Sensation  Overall Sensation Status: WFL  Bed mobility  Supine to Sit: Minimal assistance(Simultaneous filing. User may not have seen previous data.)  Sit to Supine: Unable to assess(not observed - remained OOB at conclusion Simultaneous filing.  User may not have seen previous data.)  Transfers  Sit to Stand: Minimal Assistance  Stand to sit: Minimal Assistance  Ambulation  Ambulation?: Yes  Ambulation 1  Surface: level tile  Device: Rolling Walker  Assistance: Minimal assistance  Quality of Gait: step to pattern with good heel contact and good weight bearing  Distance: 5 feet   Comments: mild unsteadiness  Stairs/Curb  Stairs?: No     Balance  Comments: midline in sitting and static stance on the walker      -dynamic is good minus on rolling walker with min assist  Exercises  Ankle Pumps: 30 per hour      Plan   Plan  Times per week: qd while on acute floor  Current Treatment Recommendations: Strengthening, Functional Mobility Training, Transfer Training, Gait

## 2019-03-25 NOTE — PROGRESS NOTES
Pt states pain is improved- 3/10. Pt states feels like slight burning. Ice on incision site. No drainage on dressing. Report called to [facility], RN on floor.

## 2019-03-25 NOTE — CARE COORDINATION
INITIAL CASE MANAGEMENT ASSESSMENT    Reviewed chart, met with patient to assess possible discharge needs. Explained Case Management role/services. Living Situation: Pt lives at home with his wife in a [place] apartment located on the 2nd floor. There is a lot of steps, pt unaware of exact number. ADLs: Pt states he is independent with assistance. DME: None. PT/OT Recs: None     Active Services: Pt is not active with any outside agencies. Transportation: Pt will need transport if family unable to accommodate with pt limitations. PCP: Dimitris Weston MD    PLAN/COMMENTS: Pt plan is to return home; however, due to the surgery and the barriers the pt may face at his residence pt and family is requesting pt be assessed from PT/OT to determine the pt's baseline to see if the pt may need skilled placement or home care. If PT/Ot recommends Skilled Placement pt would like Inpatient rehab consult to determine if the pt qualifies. --patient's insurance may not cover acute rehab level of care for given diagnosis. Sw provided list for SNF and [facility].    SW/CM provided contact information for patient or family to call with any questions. SW/CM will follow and assist as needed. Electronically signed by Julisa Toussaint on 3/25/2019 at 12:09 PM    Patient's insurance may not cover acute rehab level of care for given diagnosis. Will follow and provide snf list after surgery.     Electronically signed by Zoltan Sylvester on 3/25/2019 at 2:14 PM

## 2019-03-25 NOTE — PROGRESS NOTES
Patient resting comfortably in the chair, denying any needs at this time. Patient has chair alarm on. Fall precautions in place. VSS. Call light, telephone, and bed side table within reach. Will continue to monitor.

## 2019-03-25 NOTE — OP NOTE
then went ahead and seated our double   barrel guide onto the lateral aspect of the femur. We were satisfied with   our position. We went ahead and were ready for passage of our 3 Steinmann   pins. We passed our first Steinmann pin in a more superior position. We   then passed the 2nd and 3rd Steinmann pins more inferiorly. We were   satisfied with the position of the pins on the AP and lateral views. We then   went ahead and depth gauged. We then reamed the outer cortex of the femur. We then went ahead and inserted the appropriate length, Keon 6.5 partially   threaded screws. All hardware was in good position. The fracture was   well-reduced. We then were ready for closure. We irrigated the wound rather   copiously. We then closed the fascia with interrupted figure-of-eight   interrupted #1 Vicryl stitches. We closed with subcutaneous tissues with   interrupted 2-0 simple Vicryl stitches. We then closed the skin with   staples. Sterile dressings were applied. There were no complications.

## 2019-03-25 NOTE — PROGRESS NOTES
Patient to floor via bed. Patient reports no needs at this time. VSS. Bed locked in lowest position with bed alarm on. Patient on 2L of oxygen via nasal cannula. Call light and bed side table within reach. Will continue to monitor.

## 2019-03-25 NOTE — PROGRESS NOTES
1333 pt arrived from OR, vital signs stable, pt lethargic. Pt on 5L NC. Left hip dressing clean, dry, and intact. Left pedal pulse 2+, toes warm, good cap refill. Pt placed on 2L NC, sats remain stable.

## 2019-03-25 NOTE — CARE COORDINATION
Nicki received call from patient's daughter, Alesia Cohen ([phone]). She requested referrals to [facility] and [facility]. Referrals made. Electronically signed by Ottoniel Loredo on 3/25/2019 at 3:05 PM      5:02 PM  Nicki received notice from [address] has a bed on Wednesday. Nicki spoke with Alesia Cohen. She will review this choice with patient and family and let Nicki know. She has  phone number.     Electronically signed by Ottoniel Loredo on 3/25/2019 at 5:06 PM

## 2019-03-25 NOTE — PROGRESS NOTES
Pt alert and oriented, full ROM and sensation in left lower leg. Vital signs stable, no pain at this time. Pt placed on room air.

## 2019-03-26 LAB
ANION GAP SERPL CALCULATED.3IONS-SCNC: 12 MMOL/L (ref 3–16)
BASOPHILS ABSOLUTE: 0.1 K/UL (ref 0–0.2)
BASOPHILS RELATIVE PERCENT: 0.9 %
BUN BLDV-MCNC: 20 MG/DL (ref 7–20)
CALCIUM SERPL-MCNC: 8.3 MG/DL (ref 8.3–10.6)
CHLORIDE BLD-SCNC: 93 MMOL/L (ref 99–110)
CO2: 23 MMOL/L (ref 21–32)
CREAT SERPL-MCNC: 1.3 MG/DL (ref 0.8–1.3)
EOSINOPHILS ABSOLUTE: 0 K/UL (ref 0–0.6)
EOSINOPHILS RELATIVE PERCENT: 0.2 %
GFR AFRICAN AMERICAN: >60
GFR NON-AFRICAN AMERICAN: 53
GLUCOSE BLD-MCNC: 190 MG/DL (ref 70–99)
HCT VFR BLD CALC: 32.6 % (ref 40.5–52.5)
HEMOGLOBIN: 11.3 G/DL (ref 13.5–17.5)
INR BLD: 1.18 (ref 0.86–1.14)
LYMPHOCYTES ABSOLUTE: 0.6 K/UL (ref 1–5.1)
LYMPHOCYTES RELATIVE PERCENT: 6.7 %
MCH RBC QN AUTO: 30.3 PG (ref 26–34)
MCHC RBC AUTO-ENTMCNC: 34.7 G/DL (ref 31–36)
MCV RBC AUTO: 87.5 FL (ref 80–100)
MONOCYTES ABSOLUTE: 0.8 K/UL (ref 0–1.3)
MONOCYTES RELATIVE PERCENT: 8.2 %
NEUTROPHILS ABSOLUTE: 8 K/UL (ref 1.7–7.7)
NEUTROPHILS RELATIVE PERCENT: 84 %
PDW BLD-RTO: 14.3 % (ref 12.4–15.4)
PLATELET # BLD: 98 K/UL (ref 135–450)
PLATELET SLIDE REVIEW: ABNORMAL
PMV BLD AUTO: 9.6 FL (ref 5–10.5)
POTASSIUM REFLEX MAGNESIUM: 4.2 MMOL/L (ref 3.5–5.1)
PROTHROMBIN TIME: 13.4 SEC (ref 9.8–13)
RBC # BLD: 3.72 M/UL (ref 4.2–5.9)
RBC # BLD: NORMAL 10*6/UL
SODIUM BLD-SCNC: 128 MMOL/L (ref 136–145)
WBC # BLD: 9.5 K/UL (ref 4–11)

## 2019-03-26 PROCEDURE — 85610 PROTHROMBIN TIME: CPT

## 2019-03-26 PROCEDURE — 94760 N-INVAS EAR/PLS OXIMETRY 1: CPT

## 2019-03-26 PROCEDURE — 80048 BASIC METABOLIC PNL TOTAL CA: CPT

## 2019-03-26 PROCEDURE — 6370000000 HC RX 637 (ALT 250 FOR IP): Performed by: HOSPITALIST

## 2019-03-26 PROCEDURE — 1200000000 HC SEMI PRIVATE

## 2019-03-26 PROCEDURE — 2580000003 HC RX 258: Performed by: ORTHOPAEDIC SURGERY

## 2019-03-26 PROCEDURE — 97535 SELF CARE MNGMENT TRAINING: CPT

## 2019-03-26 PROCEDURE — 6370000000 HC RX 637 (ALT 250 FOR IP): Performed by: ORTHOPAEDIC SURGERY

## 2019-03-26 PROCEDURE — 6360000002 HC RX W HCPCS: Performed by: NURSE PRACTITIONER

## 2019-03-26 PROCEDURE — 85025 COMPLETE CBC W/AUTO DIFF WBC: CPT

## 2019-03-26 PROCEDURE — 97530 THERAPEUTIC ACTIVITIES: CPT

## 2019-03-26 PROCEDURE — 6360000002 HC RX W HCPCS: Performed by: ORTHOPAEDIC SURGERY

## 2019-03-26 PROCEDURE — 36415 COLL VENOUS BLD VENIPUNCTURE: CPT

## 2019-03-26 RX ORDER — HYDROCODONE BITARTRATE AND ACETAMINOPHEN 5; 325 MG/1; MG/1
1-2 TABLET ORAL EVERY 4 HOURS PRN
Qty: 40 TABLET | Refills: 0 | Status: SHIPPED | OUTPATIENT
Start: 2019-03-26 | End: 2019-04-02

## 2019-03-26 RX ORDER — WARFARIN SODIUM 5 MG/1
5 TABLET ORAL
Status: COMPLETED | OUTPATIENT
Start: 2019-03-26 | End: 2019-03-26

## 2019-03-26 RX ORDER — WARFARIN SODIUM 2.5 MG/1
2.5 TABLET ORAL DAILY
Qty: 30 TABLET | Refills: 3 | Status: SHIPPED | OUTPATIENT
Start: 2019-03-26 | End: 2019-04-11 | Stop reason: DRUGHIGH

## 2019-03-26 RX ADMIN — HYDROCODONE BITARTRATE AND ACETAMINOPHEN 1 TABLET: 5; 325 TABLET ORAL at 08:50

## 2019-03-26 RX ADMIN — DOCUSATE SODIUM 100 MG: 100 CAPSULE, LIQUID FILLED ORAL at 21:52

## 2019-03-26 RX ADMIN — ATORVASTATIN CALCIUM 20 MG: 20 TABLET, FILM COATED ORAL at 08:42

## 2019-03-26 RX ADMIN — Medication 2 G: at 03:51

## 2019-03-26 RX ADMIN — HYDROCODONE BITARTRATE AND ACETAMINOPHEN 1 TABLET: 5; 325 TABLET ORAL at 16:58

## 2019-03-26 RX ADMIN — WARFARIN SODIUM 5 MG: 5 TABLET ORAL at 16:58

## 2019-03-26 RX ADMIN — DOCUSATE SODIUM 100 MG: 100 CAPSULE, LIQUID FILLED ORAL at 08:43

## 2019-03-26 RX ADMIN — CARVEDILOL 12.5 MG: 12.5 TABLET, FILM COATED ORAL at 08:42

## 2019-03-26 RX ADMIN — PANTOPRAZOLE SODIUM 40 MG: 40 TABLET, DELAYED RELEASE ORAL at 06:11

## 2019-03-26 RX ADMIN — Medication 10 ML: at 21:00

## 2019-03-26 RX ADMIN — CETIRIZINE HYDROCHLORIDE 10 MG: 10 TABLET, FILM COATED ORAL at 08:43

## 2019-03-26 RX ADMIN — ENOXAPARIN SODIUM 30 MG: 30 INJECTION SUBCUTANEOUS at 21:52

## 2019-03-26 RX ADMIN — HYDROCODONE BITARTRATE AND ACETAMINOPHEN 1 TABLET: 5; 325 TABLET ORAL at 21:52

## 2019-03-26 RX ADMIN — HYDROCODONE BITARTRATE AND ACETAMINOPHEN 1 TABLET: 5; 325 TABLET ORAL at 03:51

## 2019-03-26 RX ADMIN — ALPRAZOLAM 0.25 MG: 0.25 TABLET ORAL at 21:52

## 2019-03-26 RX ADMIN — FUROSEMIDE 40 MG: 40 TABLET ORAL at 08:42

## 2019-03-26 RX ADMIN — Medication 10 ML: at 08:43

## 2019-03-26 RX ADMIN — CARVEDILOL 12.5 MG: 12.5 TABLET, FILM COATED ORAL at 16:58

## 2019-03-26 RX ADMIN — ENOXAPARIN SODIUM 30 MG: 30 INJECTION SUBCUTANEOUS at 08:43

## 2019-03-26 RX ADMIN — LOSARTAN POTASSIUM 25 MG: 25 TABLET ORAL at 08:42

## 2019-03-26 ASSESSMENT — PAIN DESCRIPTION - PAIN TYPE
TYPE: SURGICAL PAIN
TYPE: ACUTE PAIN;SURGICAL PAIN
TYPE: ACUTE PAIN

## 2019-03-26 ASSESSMENT — PAIN DESCRIPTION - PROGRESSION
CLINICAL_PROGRESSION: NOT CHANGED
CLINICAL_PROGRESSION: NOT CHANGED
CLINICAL_PROGRESSION: GRADUALLY IMPROVING
CLINICAL_PROGRESSION: GRADUALLY IMPROVING
CLINICAL_PROGRESSION: NOT CHANGED
CLINICAL_PROGRESSION: GRADUALLY IMPROVING

## 2019-03-26 ASSESSMENT — PAIN DESCRIPTION - ORIENTATION
ORIENTATION: LEFT

## 2019-03-26 ASSESSMENT — PAIN DESCRIPTION - LOCATION
LOCATION: HIP

## 2019-03-26 ASSESSMENT — PAIN SCALES - GENERAL
PAINLEVEL_OUTOF10: 6
PAINLEVEL_OUTOF10: 4
PAINLEVEL_OUTOF10: 2
PAINLEVEL_OUTOF10: 2
PAINLEVEL_OUTOF10: 3
PAINLEVEL_OUTOF10: 6
PAINLEVEL_OUTOF10: 0
PAINLEVEL_OUTOF10: 5
PAINLEVEL_OUTOF10: 6

## 2019-03-26 ASSESSMENT — PAIN DESCRIPTION - FREQUENCY
FREQUENCY: CONTINUOUS

## 2019-03-26 ASSESSMENT — PAIN DESCRIPTION - ONSET
ONSET: ON-GOING

## 2019-03-26 ASSESSMENT — PAIN DESCRIPTION - DESCRIPTORS
DESCRIPTORS: ACHING

## 2019-03-26 NOTE — PROGRESS NOTES
[facility] Orthopedic Surgery   Progress Note      S/P :  SUBJECTIVE  In chair. Assisted to to ambulate per OT and did well with walker. . Pain is   described in left hip and with the intensity of moderate. Pain is described as aching. OBJECTIVE              Physical                      VITALS:  BP (!) 143/76   Pulse 54   Temp 97.9 °F (36.6 °C) (Oral)   Resp 16   Ht 5' 6\" (1.676 m)   Wt 171 lb 1.2 oz (77.6 kg)   SpO2 94%   BMI 27.61 kg/m²                     MUSCULOSKELETAL:  left foot NVI. Wiggles toes to command. Pedal pulses are palpable. NEUROLOGIC:                                  Sensory:  Touch:  Left Lower Extremity:  normal                                                 Surgical wound appears clean and dry left hip with gauze and tegaderm dressing.      Data       CBC:   Lab Results   Component Value Date    WBC 9.5 03/26/2019    RBC 3.72 03/26/2019    HGB 11.3 03/26/2019    HCT 32.6 03/26/2019    MCV 87.5 03/26/2019    MCH 30.3 03/26/2019    MCHC 34.7 03/26/2019    RDW 14.3 03/26/2019    PLT 98 03/26/2019    MPV 9.6 03/26/2019        WBC:    Lab Results   Component Value Date    WBC 9.5 03/26/2019        Hemoglobin/Hematocrit:    Lab Results   Component Value Date    HGB 11.3 03/26/2019    HCT 32.6 03/26/2019        PT/INR:    Lab Results   Component Value Date    PROTIME 13.4 03/26/2019    INR 1.18 03/26/2019              Current Inpatient Medications             Current Facility-Administered Medications: enoxaparin (LOVENOX) injection 30 mg, 30 mg, Subcutaneous, BID  HYDROcodone-acetaminophen (NORCO) 5-325 MG per tablet 1 tablet, 1 tablet, Oral, Q4H PRN **OR** HYDROcodone-acetaminophen (NORCO) 5-325 MG per tablet 2 tablet, 2 tablet, Oral, Q4H PRN  HYDROmorphone (DILAUDID) injection 0.25 mg, 0.25 mg, Intravenous, Q3H PRN **OR** HYDROmorphone (DILAUDID) injection 0.5 mg, 0.5 mg, Intravenous, Q3H PRN  docusate sodium (COLACE) capsule 100 mg, 100 mg, Oral, BID  warfarin (COUMADIN) daily dosing (placeholder), , Other, RX Placeholder  ALPRAZolam (XANAX) tablet 0.25 mg, 0.25 mg, Oral, Nightly PRN  tamsulosin (FLOMAX) capsule 0.4 mg, 0.4 mg, Oral, Daily  pantoprazole (PROTONIX) tablet 40 mg, 40 mg, Oral, QAM AC  cetirizine (ZYRTEC) tablet 10 mg, 10 mg, Oral, Daily  furosemide (LASIX) tablet 40 mg, 40 mg, Oral, Daily  carvedilol (COREG) tablet 12.5 mg, 12.5 mg, Oral, BID WC  atorvastatin (LIPITOR) tablet 20 mg, 20 mg, Oral, Daily  sodium chloride flush 0.9 % injection 10 mL, 10 mL, Intravenous, 2 times per day  magnesium hydroxide (MILK OF MAGNESIA) 400 MG/5ML suspension 30 mL, 30 mL, Oral, Daily PRN  ondansetron (ZOFRAN) injection 4 mg, 4 mg, Intravenous, Q4H PRN  acetaminophen (TYLENOL) tablet 650 mg, 650 mg, Oral, Q4H PRN  losartan (COZAAR) tablet 25 mg, 25 mg, Oral, Daily    ASSESSMENT AND PLAN      Fall  Left subcapital hip fx, post pinning yesterday Dr Dixon Salamanca, stable exam  DVT prophylaxis ordered, Lovenox as bridge to resume Coumadin  PT OT for ADL's and ambulation as tolerated.  WBAT  SS for DC planning, REhab consulted at family request.   IV or PO pain med as ordered    Latoya Gaines  3/26/2019  9:20 AM

## 2019-03-26 NOTE — PROGRESS NOTES
Clinical Pharmacy Note  Warfarin Consult    Mary Cox is a 81 y.o. male receiving warfarin managed by pharmacy. Patient being bridged with none. Warfarin Indication: atrial fib  Target INR range: 2-3   Dose prior to admission: 2.5mg Mondays, 5mg all other days    Current warfarin drug-drug interactions: none    Recent Labs     03/24/19  0542 03/24/19  1605 03/25/19  0502 03/26/19  0526   HGB 11.8*  --  11.8* 11.3*   HCT 34.0*  --  34.0* 32.6*   INR 3.60* 2.23* 1.34* 1.18*       Assessment/Plan:    Warfarin 5 mg tonight. Daily PT/INR until stable within therapeutic range. Thank you for the consult. Will continue to follow.     Bob Pavon PharmD.  3/26/2019  10:52 AM

## 2019-03-26 NOTE — PROGRESS NOTES
Patient just finished eating dinner and is resting in the chair. Patient has call light, telephone, and bed side table within reach. VSS. Patient has no requests or needs at this time. Fall precautions in place. IV clean, dry, and intact. Will continue to monitor.

## 2019-03-26 NOTE — DISCHARGE INSTR - COC
[facility]) Continuity of Care Form    Patient Name:  Margo Pittman  : 1935    MRN:  [id number]    Admit date:  3/23/2019  Discharge date:  3/27/2019    Code Status Order: Full Code  Advance Directives: No    Admitting Physician: Tyrone Carrera MD  PCP: Jasmine Halsted    [facility] [address] [facility] W4T-8799/9016-68  Discharging Unit Phone Number: [phone]    Emergency Contact:        Past Surgical History:  Past Surgical History:   Procedure Laterality Date    BACK SURGERY      CARDIAC DEFIBRILLATOR PLACEMENT      CORONARY ANGIOPLASTY WITH STENT PLACEMENT      HERNIA REPAIR      HIP PINNING Left 3/25/2019    CLOSED REDUCTION PERCUTANEOUS LEFT HIP PINNING performed by Tae Quintero MD at [address] Left 2019    CLOSED REDUCTION PERCUTANEOUS LEFT HIP PINNING POSSIBLE LEFT HIP HEMIARTHROPLASTY    JOINT REPLACEMENT Bilateral     PACEMAKER INSERTION         Immunization History: There is no immunization history on file for this patient. Active Problems:  Principal Problem:    Hip fracture requiring operative repair, left, closed, initial encounter [facility])  Active Problems:    Hip pain, acute, left    Essential hypertension    Hyperlipidemia    DMII (diabetes mellitus, type 2) (HCC)    Atrial fibrillation ([address]  Resolved Problems:    * No resolved hospital problems.  *      Isolation/Infection:       Nurse Assessment:  Last Vital Signs:BP (!) 143/76   Pulse 54   Temp 97.9 °F (36.6 °C) (Oral)   Resp 16   Ht 5' 6\" (1.676 m)   Wt 171 lb 1.2 oz (77.6 kg)   SpO2 94%   BMI 27.61 kg/m²   Last documented pain score (0-10 scale): Pain Level: 0  Last Weight:   Wt Readings from Last 1 Encounters:   19 171 lb 1.2 oz (77.6 kg)     Mental Status:  oriented and alert     IV Access:  - None    Nursing Mobility/ADLs:  Walking   Assisted  Transfer  Assisted  Bathing  Assisted  Dressing  Assisted  Toileting  Assisted  Feeding Independent  Med Admin  Dependent  Med Delivery   whole    Wound Care Documentation and Therapy:  Keep tan Mepilex dressing clean and intact for 7 days after surgery then remove and leave wound to air. Mepilex is waterproof for showering. Elimination:  Urinary Catheter: None   Colostomy/Ileostomy: No  Continence:   · Bowel: Yes  · Bladder: Yes  Date of Last BM: 3/25/2019    Intake/Output Summary (Last 24 hours)     Intake/Output Summary (Last 24 hours) at 3/26/2019 9990  Last data filed at 3/26/2019 0440  Gross per 24 hour   Intake 2701 ml   Output 675 ml   Net 2026 ml     Safety Concerns:     History of Falls (last 30 days) and At Risk for Falls    Impairments/Disabilities:      None    Nutrition Therapy:  Current Nutrition Therapy: DIET GENERAL;  Routes of Feeding: Oral  Daily Fluid Restriction: yes - amount 1500mL  Last Modified Barium Swallow with Video (Video Swallowing Test): not done    Treatments at the Time of Hospital Discharge:   Respiratory Treatments: N/A  Oxygen Therapy:  is not on home oxygen therapy. Ventilator:    - No ventilator support    Lab orders for discharge:PT/INR q Mon and Thurs        Rehab Therapies: Physical Therapy, Occupational Therapy and nursing care  Weight Bearing Status/Restrictions: No weight bearing restirctions  Other Medical Equipment (for information only, NOT a DME order): rolling walker   Other Treatments: Resume Coumadin with Lovenox bridge until therapeutic.  Management per Coumadin clinic [phone]    Patient's personal belongings (please select all that are sent with patient):  None    RN SIGNATURE:  Electronically signed by Marco Gay RN on 3/27/19 at 11:08 AM    PHYSICIAN SECTION    Prognosis: Good    Condition at Discharge: Stable    Rehab Potential (if transferring to Rehab): Good    Physician Certification: I certify the above orders, information, and transfer of Denise Washington is necessary for the continuing treatment of the diagnosis listed and that he requires Acute rehab  for less 30 days.      Update Admission H&P: No change in H&P    PHYSICIAN SIGNATURE:  Electronically signed by GENIA Mckenzie CNP on 3/26/19 at 8:13 AM/ Dr Joe Justice Status Date: 3/24/19    [facility] Readmission Risk Assessment Score: 19%    Discharging to Facility/ Agency   · Name: [facility]  · Address:  · Phone:[phone]  · Fax:    Dialysis Facility (if applicable)   · Name:  · Address:  · Dialysis Schedule:  · Phone:  · Fax:    / signature:Electronically signed by Brennon Kirk on 3/27/2019 at 10:40 AM           Followup Dr Shanae Dumas in 2 weeks   [facility], [address] [address],   [phone]

## 2019-03-26 NOTE — CARE COORDINATION
Nicki confirmed with daughter, Mary Wakefield, that they prefer [facility]. Sw discussed transport options with daughter. At this time, she is considering taking patient by car to the facility.      Confirmed with Rylan Thornton at [facility] will have a bed for patient tomorrow (Wednesday) under the [facility] to sn[facility] program.    Electronically signed by Leatha Sandoval on 3/26/2019 at 12:57 PM

## 2019-03-26 NOTE — PROGRESS NOTES
Hospitalist Progress Note      PCP: Karina Blackmon    Date of Admission: 3/23/2019    Chief Complaint: Following a fall, developed L hip pain, unable to walk after driving home. Hospital Course: minimally displaced L fem neck fx on imaging in ED    Subjective:       Medications:  Reviewed    Infusion Medications   Scheduled Medications    enoxaparin  30 mg Subcutaneous BID    warfarin  5 mg Oral Once    docusate sodium  100 mg Oral BID    warfarin (COUMADIN) daily dosing (placeholder)   Other RX Placeholder    tamsulosin  0.4 mg Oral Daily    pantoprazole  40 mg Oral QAM AC    cetirizine  10 mg Oral Daily    furosemide  40 mg Oral Daily    carvedilol  12.5 mg Oral BID WC    atorvastatin  20 mg Oral Daily    sodium chloride flush  10 mL Intravenous 2 times per day    losartan  25 mg Oral Daily     PRN Meds: HYDROcodone 5 mg - acetaminophen **OR** HYDROcodone 5 mg - acetaminophen, HYDROmorphone **OR** HYDROmorphone, ALPRAZolam, magnesium hydroxide, ondansetron, acetaminophen      Intake/Output Summary (Last 24 hours) at 3/26/2019 1122  Last data filed at 3/26/2019 0950  Gross per 24 hour   Intake 2941 ml   Output 695 ml   Net 2246 ml       Physical Exam Performed:    BP (!) 143/76   Pulse 54   Temp 97.9 °F (36.6 °C) (Oral)   Resp 16   Ht 5' 6\" (1.676 m)   Wt 171 lb 1.2 oz (77.6 kg)   SpO2 94%   BMI 27.61 kg/m²     General appearance: No apparent distress, appears stated age and cooperative. HEENT: Pupils equal, round, and reactive to light. Conjunctivae/corneas clear. Neck: Supple, with full range of motion. No jugular venous distention. Trachea midline. Respiratory:  Normal respiratory effort. Clear to auscultation, bilaterally without Rales/Wheezes/Rhonchi. Cardiovascular: Regular rate and rhythm with normal S1/S2 without murmurs, rubs or gallops. Abdomen: Soft, non-tender, non-distended with normal bowel sounds. Musculoskeletal: No clubbing, cyanosis or edema bilaterally.  Hyperlipidemia [E78.5] 03/24/2019    DMII (diabetes mellitus, type 2) ([address] [E11.9] 03/24/2019    Atrial fibrillation ([address] [I48.91] 03/24/2019       1) Atrial Fib  - asymptomatic, rate controlled  - anticoag reversed, resume post op when ok per Ortho  - pain adequately controlled    2) L fem neck fx  - OR yesterday, pain controlled    3) DM  - corrective ISS, no hypoglycemic episodes    4) SIADH  - fluid restrict, continue lasix, repeat renal      DVT Prophylaxis: heparin sub cut  Diet: DIET GENERAL; Daily Fluid Restriction: 1500 ml  Code Status: Full Code         Corey Curiel MD

## 2019-03-27 VITALS
TEMPERATURE: 98.1 F | DIASTOLIC BLOOD PRESSURE: 84 MMHG | RESPIRATION RATE: 16 BRPM | BODY MASS INDEX: 27.78 KG/M2 | HEIGHT: 66 IN | SYSTOLIC BLOOD PRESSURE: 151 MMHG | HEART RATE: 56 BPM | OXYGEN SATURATION: 96 % | WEIGHT: 172.84 LBS

## 2019-03-27 LAB
ANION GAP SERPL CALCULATED.3IONS-SCNC: 13 MMOL/L (ref 3–16)
BASOPHILS ABSOLUTE: 0 K/UL (ref 0–0.2)
BASOPHILS RELATIVE PERCENT: 0.1 %
BUN BLDV-MCNC: 27 MG/DL (ref 7–20)
CALCIUM SERPL-MCNC: 8.2 MG/DL (ref 8.3–10.6)
CHLORIDE BLD-SCNC: 88 MMOL/L (ref 99–110)
CO2: 23 MMOL/L (ref 21–32)
CREAT SERPL-MCNC: 1.4 MG/DL (ref 0.8–1.3)
EOSINOPHILS ABSOLUTE: 0.1 K/UL (ref 0–0.6)
EOSINOPHILS RELATIVE PERCENT: 0.8 %
GFR AFRICAN AMERICAN: 59
GFR NON-AFRICAN AMERICAN: 48
GLUCOSE BLD-MCNC: 122 MG/DL (ref 70–99)
GLUCOSE BLD-MCNC: 161 MG/DL (ref 70–99)
HCT VFR BLD CALC: 31.3 % (ref 40.5–52.5)
HEMOGLOBIN: 11 G/DL (ref 13.5–17.5)
INR BLD: 1.33 (ref 0.86–1.14)
LYMPHOCYTES ABSOLUTE: 0.8 K/UL (ref 1–5.1)
LYMPHOCYTES RELATIVE PERCENT: 12.8 %
MCH RBC QN AUTO: 30.6 PG (ref 26–34)
MCHC RBC AUTO-ENTMCNC: 35 G/DL (ref 31–36)
MCV RBC AUTO: 87.4 FL (ref 80–100)
MONOCYTES ABSOLUTE: 1 K/UL (ref 0–1.3)
MONOCYTES RELATIVE PERCENT: 15.4 %
NEUTROPHILS ABSOLUTE: 4.5 K/UL (ref 1.7–7.7)
NEUTROPHILS RELATIVE PERCENT: 70.9 %
PDW BLD-RTO: 14.3 % (ref 12.4–15.4)
PERFORMED ON: ABNORMAL
PLATELET # BLD: 82 K/UL (ref 135–450)
PMV BLD AUTO: 8.6 FL (ref 5–10.5)
POTASSIUM REFLEX MAGNESIUM: 3.8 MMOL/L (ref 3.5–5.1)
PROTHROMBIN TIME: 15.2 SEC (ref 9.8–13)
RBC # BLD: 3.59 M/UL (ref 4.2–5.9)
SODIUM BLD-SCNC: 124 MMOL/L (ref 136–145)
SODIUM URINE: 22 MMOL/L
TSH SERPL DL<=0.05 MIU/L-ACNC: 3.25 UIU/ML (ref 0.27–4.2)
WBC # BLD: 6.3 K/UL (ref 4–11)

## 2019-03-27 PROCEDURE — 84300 ASSAY OF URINE SODIUM: CPT

## 2019-03-27 PROCEDURE — 6370000000 HC RX 637 (ALT 250 FOR IP): Performed by: ORTHOPAEDIC SURGERY

## 2019-03-27 PROCEDURE — 97530 THERAPEUTIC ACTIVITIES: CPT

## 2019-03-27 PROCEDURE — 85610 PROTHROMBIN TIME: CPT

## 2019-03-27 PROCEDURE — 36415 COLL VENOUS BLD VENIPUNCTURE: CPT

## 2019-03-27 PROCEDURE — 80048 BASIC METABOLIC PNL TOTAL CA: CPT

## 2019-03-27 PROCEDURE — 85025 COMPLETE CBC W/AUTO DIFF WBC: CPT

## 2019-03-27 PROCEDURE — 84443 ASSAY THYROID STIM HORMONE: CPT

## 2019-03-27 PROCEDURE — 2580000003 HC RX 258: Performed by: ORTHOPAEDIC SURGERY

## 2019-03-27 PROCEDURE — 6360000002 HC RX W HCPCS: Performed by: NURSE PRACTITIONER

## 2019-03-27 RX ORDER — WARFARIN SODIUM 5 MG/1
5 TABLET ORAL
Status: DISCONTINUED | OUTPATIENT
Start: 2019-03-27 | End: 2019-03-27 | Stop reason: HOSPADM

## 2019-03-27 RX ORDER — PSEUDOEPHEDRINE HCL 30 MG
100 TABLET ORAL 2 TIMES DAILY
Qty: 60 CAPSULE | Refills: 0 | Status: SHIPPED | OUTPATIENT
Start: 2019-03-27

## 2019-03-27 RX ORDER — ALPRAZOLAM 0.25 MG/1
0.25 TABLET ORAL NIGHTLY PRN
Qty: 30 TABLET | Refills: 0 | Status: SHIPPED | OUTPATIENT
Start: 2019-03-27 | End: 2019-04-26

## 2019-03-27 RX ADMIN — CETIRIZINE HYDROCHLORIDE 10 MG: 10 TABLET, FILM COATED ORAL at 08:29

## 2019-03-27 RX ADMIN — CARVEDILOL 12.5 MG: 12.5 TABLET, FILM COATED ORAL at 08:28

## 2019-03-27 RX ADMIN — DOCUSATE SODIUM 100 MG: 100 CAPSULE, LIQUID FILLED ORAL at 08:29

## 2019-03-27 RX ADMIN — PANTOPRAZOLE SODIUM 40 MG: 40 TABLET, DELAYED RELEASE ORAL at 05:18

## 2019-03-27 RX ADMIN — HYDROCODONE BITARTRATE AND ACETAMINOPHEN 1 TABLET: 5; 325 TABLET ORAL at 09:07

## 2019-03-27 RX ADMIN — Medication 10 ML: at 08:41

## 2019-03-27 RX ADMIN — ATORVASTATIN CALCIUM 20 MG: 20 TABLET, FILM COATED ORAL at 08:28

## 2019-03-27 RX ADMIN — LOSARTAN POTASSIUM 25 MG: 25 TABLET ORAL at 08:31

## 2019-03-27 RX ADMIN — FUROSEMIDE 40 MG: 40 TABLET ORAL at 08:30

## 2019-03-27 RX ADMIN — ENOXAPARIN SODIUM 30 MG: 30 INJECTION SUBCUTANEOUS at 08:37

## 2019-03-27 RX ADMIN — TAMSULOSIN HYDROCHLORIDE 0.4 MG: 0.4 CAPSULE ORAL at 08:27

## 2019-03-27 ASSESSMENT — PAIN DESCRIPTION - PAIN TYPE
TYPE: SURGICAL PAIN

## 2019-03-27 ASSESSMENT — PAIN DESCRIPTION - LOCATION
LOCATION: HIP

## 2019-03-27 ASSESSMENT — PAIN DESCRIPTION - FREQUENCY
FREQUENCY: CONTINUOUS

## 2019-03-27 ASSESSMENT — PAIN DESCRIPTION - ONSET
ONSET: ON-GOING

## 2019-03-27 ASSESSMENT — PAIN DESCRIPTION - ORIENTATION
ORIENTATION: LEFT

## 2019-03-27 ASSESSMENT — PAIN DESCRIPTION - DESCRIPTORS
DESCRIPTORS: ACHING

## 2019-03-27 ASSESSMENT — PAIN SCALES - GENERAL
PAINLEVEL_OUTOF10: 4
PAINLEVEL_OUTOF10: 5
PAINLEVEL_OUTOF10: 4
PAINLEVEL_OUTOF10: 0

## 2019-03-27 ASSESSMENT — PAIN DESCRIPTION - PROGRESSION
CLINICAL_PROGRESSION: NOT CHANGED

## 2019-03-27 ASSESSMENT — PAIN - FUNCTIONAL ASSESSMENT
PAIN_FUNCTIONAL_ASSESSMENT: ACTIVITIES ARE NOT PREVENTED

## 2019-03-27 NOTE — DISCHARGE SUMMARY
COUMADIN  Take 1 tablet by mouth daily Daily dosing per Coumadin [facility]  [phone]  What changed:    · when to take this  · additional instructions        CONTINUE taking these medications    atorvastatin 20 MG tablet  Commonly known as:  LIPITOR  Take 1 tablet by mouth daily     carvedilol 12.5 MG tablet  Commonly known as:  COREG     ferrous sulfate 325 (65 Fe) MG tablet     furosemide 40 MG tablet  Commonly known as:  LASIX     levocetirizine 5 MG tablet  Commonly known as:  XYZAL     losartan 25 MG tablet  Commonly known as:  COZAAR     omeprazole 20 MG delayed release capsule  Commonly known as:  PRILOSEC     tamsulosin 0.4 MG capsule  Commonly known as:  FLOMAX        STOP taking these medications    lisinopril 5 MG tablet  Commonly known as:  PRINIVIL;ZESTRIL     oxyCODONE-acetaminophen 5-325 MG per tablet  Commonly known as:  PERCOCET           Where to Get Your Medications      These medications were sent to [address] [place] [address] [address] - P [phone] - F [phone]  [address] [facility] [address]    Hours:  24-hours Phone:  [phone]   · docusate 100 MG Caps  · enoxaparin 30 MG/0.3ML injection     You can get these medications from any pharmacy    Bring a paper prescription for each of these medications  · ALPRAZolam 0.25 MG tablet  · HYDROcodone-acetaminophen 5-325 MG per tablet  · warfarin 2.5 MG tablet           Physical Exam:    Vitals:  Vitals:    03/26/19 2211 03/26/19 2327 03/27/19 0345 03/27/19 0730   BP: (!) 161/78 (!) 149/78 (!) 143/78 (!) 151/84   Pulse: 60 60 67 56   Resp: 14 16 14 16   Temp: 97.7 °F (36.5 °C) 98.1 °F (36.7 °C) 97.7 °F (36.5 °C) 98.1 °F (36.7 °C)   TempSrc: Oral Oral Oral Oral   SpO2: 96% 94% 96% 96%   Weight:   172 lb 13.5 oz (78.4 kg)    Height:         Weight: Weight: 172 lb 13.5 oz (78.4 kg)     24 hour intake/output:    Intake/Output Summary (Last 24 hours) at 3/27/2019 0943  Last data filed at 3/27/2019 0902  Gross per 24 hour   Intake 480 ml   Output 1500 ml   Net -1020 ml       General appearance - alert, well appearing, and in no distress  Chest - clear to auscultation, no wheezes, rales or rhonchi, symmetric air entry  Heart - normal rate, regular rhythm, normal S1, S2, no murmurs, rubs, clicks or gallops  Abdomen - soft, nontender, nondistended, no masses or organomegaly  Obese: No; Protuberant: No   Neurological - alert, oriented, normal speech, no focal findings or movement disorder noted  Extremities - peripheral pulses normal, no pedal edema, no clubbing or cyanosis  Skin - normal coloration and turgor, no rashes           Radiology reports as per the Radiologist  Radiology: Xr Hip Left (2-3 Views)    Result Date: 3/23/2019  EXAMINATION: 2 XRAY VIEWS OF THE LEFT HIP 3/23/2019 9:31 pm COMPARISON: None. HISTORY: ORDERING SYSTEM PROVIDED HISTORY: slipped on grass, has left groin pain. TECHNOLOGIST PROVIDED HISTORY: Reason for exam:->slipped on grass, has left groin pain. Ordering Physician Provided Reason for Exam: slipped on grass, has left groin pain. Acuity: Acute Type of Exam: Initial 81-year-old male who slipped on glass and has left groin pain FINDINGS: There is an acute subcapital left femoral neck fracture with foreshortening of the left femoral neck and cortical offset. Increased density is seen along the left femoral neck. 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 Both femoral heads project over the acetabula. Iliac wings and pubic rami symmetric in appearance. Moderate to severe degenerative changes in the lower lumbar/lumbosacral spine. Bilateral SI joints appear patent. Visualized sacral arcuate lines appear intact. Moderate stool burden. Atherosclerotic calcification of the vasculature. Acute subcapital left femoral neck fracture with foreshortening of the left femoral neck and cortical offset.      Xr Chest 1 Vw    Result Date: 3/23/2019  EXAMINATION: SINGLE XRAY VIEW OF THE CHEST 3/23/2019 9:40 pm COMPARISON: 05/05/2017 HISTORY: ORDERING SYSTEM PROVIDED HISTORY: hip fracture TECHNOLOGIST PROVIDED HISTORY: Reason for exam:->hip fracture Reason for exam:->pre op Ordering Physician Provided Reason for Exam: pre-op; hip fracture Acuity: Acute Type of Exam: Initial 43-year-old male who is preop for left hip fracture FINDINGS: Left-sided subclavian approach cardiac pacemaker device distal lead tip stable in position. Mild cardiomegaly. Cardiac and mediastinal contours remain unchanged. Trachea midline. Atherosclerotic calcification of the thoracic aorta. No pneumothorax. No acute focal airspace consolidation or pleural effusions. Stable moderate elevation of the right hemidiaphragm. Right basilar atelectasis. Visualized osseous structures remain unchanged. No mediastinal shift. No free air below the diaphragm. 1. Stable moderate elevation of the right hemidiaphragm. Right basilar atelectasis. 2. No acute focal airspace consolidation or pleural effusions. 3. Mild cardiomegaly.         Results for orders placed or performed during the hospital encounter of 03/23/19   CBC Auto Differential   Result Value Ref Range    WBC 6.9 4.0 - 11.0 K/uL    RBC 3.88 (L) 4.20 - 5.90 M/uL    Hemoglobin 11.8 (L) 13.5 - 17.5 g/dL    Hematocrit 34.5 (L) 40.5 - 52.5 %    MCV 89.1 80.0 - 100.0 fL    MCH 30.6 26.0 - 34.0 pg    MCHC 34.3 31.0 - 36.0 g/dL    RDW 14.3 12.4 - 15.4 %    Platelets 041 (L) 275 - 450 K/uL    MPV 8.7 5.0 - 10.5 fL    Neutrophils % 79.2 %    Lymphocytes % 8.9 %    Monocytes % 7.5 %    Eosinophils % 3.7 %    Basophils % 0.7 %    Neutrophils # 5.5 1.7 - 7.7 K/uL    Lymphocytes # 0.6 (L) 1.0 - 5.1 K/uL    Monocytes # 0.5 0.0 - 1.3 K/uL    Eosinophils # 0.3 0.0 - 0.6 K/uL    Basophils # 0.0 0.0 - 0.2 K/uL   Comprehensive Metabolic Panel w/ Reflex to MG   Result Value Ref Range    Sodium 135 (L) 136 - 145 mmol/L    Potassium reflex Magnesium 4.1 3.5 - 5.1 mmol/L    Chloride 93 (L) 99 - 110 mmol/L    CO2 24 21 - 32 mmol/L    Anion Gap 18 (H) 3 - 16    Glucose 122 (H) 70 - 99 mg/dL    BUN 18 7 - 20 mg/dL    CREATININE 1.2 0.8 - 1.3 mg/dL    GFR Non-African American 58 (A) >60    GFR African American >60 >60    Calcium 9.2 8.3 - 10.6 mg/dL    Total Protein 6.5 6.4 - 8.2 g/dL    Alb 4.2 3.4 - 5.0 g/dL    Albumin/Globulin Ratio 1.8 1.1 - 2.2    Total Bilirubin 0.7 0.0 - 1.0 mg/dL    Alkaline Phosphatase 110 40 - 129 U/L    ALT 22 10 - 40 U/L    AST 34 15 - 37 U/L    Globulin 2.3 g/dL   Protime-INR   Result Value Ref Range    Protime 41.0 (H) 9.8 - 13.0 sec    INR 3.60 (H) 0.86 - 1.14   Protime-INR   Result Value Ref Range    Protime 41.0 (H) 9.8 - 13.0 sec    INR 3.60 (H) 0.86 - 5.30   Basic Metabolic Panel w/ Reflex to MG   Result Value Ref Range    Sodium 133 (L) 136 - 145 mmol/L    Potassium reflex Magnesium 3.9 3.5 - 5.1 mmol/L    Chloride 95 (L) 99 - 110 mmol/L    CO2 25 21 - 32 mmol/L    Anion Gap 13 3 - 16    Glucose 117 (H) 70 - 99 mg/dL    BUN 16 7 - 20 mg/dL    CREATININE 1.1 0.8 - 1.3 mg/dL    GFR Non-African American >60 >60    GFR African American >60 >60    Calcium 9.1 8.3 - 10.6 mg/dL   CBC auto differential   Result Value Ref Range    WBC 5.7 4.0 - 11.0 K/uL    RBC 3.89 (L) 4.20 - 5.90 M/uL    Hemoglobin 11.8 (L) 13.5 - 17.5 g/dL    Hematocrit 34.0 (L) 40.5 - 52.5 %    MCV 87.4 80.0 - 100.0 fL    MCH 30.3 26.0 - 34.0 pg    MCHC 34.7 31.0 - 36.0 g/dL    RDW 14.3 12.4 - 15.4 %    Platelets 796 (L) 044 - 450 K/uL    MPV 8.9 5.0 - 10.5 fL    Neutrophils % 66.0 %    Lymphocytes % 15.2 %    Monocytes % 9.9 %    Eosinophils % 7.9 %    Basophils % 1.0 %    Neutrophils # 3.7 1.7 - 7.7 K/uL    Lymphocytes # 0.9 (L) 1.0 - 5.1 K/uL    Monocytes # 0.6 0.0 - 1.3 K/uL    Eosinophils # 0.4 0.0 - 0.6 K/uL    Basophils # 0.1 0.0 - 0.2 K/uL   Protime-INR   Result Value Ref Range    Protime 25.4 (H) 9.8 - 13.0 sec    INR 2.23 (H) 0.86 - 4.66   Basic Metabolic Panel w/ Reflex to MG   Result Value Ref Range    Sodium 132 (L) 136 - 145 mmol/L    Potassium reflex Magnesium 3.9 3.5 - 5.1 mmol/L    Chloride 95 (L) 99 - 110 mmol/L    CO2 25 21 - 32 mmol/L    Anion Gap 12 3 - 16    Glucose 121 (H) 70 - 99 mg/dL    BUN 16 7 - 20 mg/dL    CREATININE 1.3 0.8 - 1.3 mg/dL    GFR Non- 53 (A) >60    GFR African American >60 >60    Calcium 8.5 8.3 - 10.6 mg/dL   CBC auto differential   Result Value Ref Range    WBC 5.7 4.0 - 11.0 K/uL    RBC 3.84 (L) 4.20 - 5.90 M/uL    Hemoglobin 11.8 (L) 13.5 - 17.5 g/dL    Hematocrit 34.0 (L) 40.5 - 52.5 %    MCV 88.6 80.0 - 100.0 fL    MCH 30.7 26.0 - 34.0 pg    MCHC 34.6 31.0 - 36.0 g/dL    RDW 14.3 12.4 - 15.4 %    Platelets 91 (L) 010 - 450 K/uL    MPV 8.3 5.0 - 10.5 fL    Neutrophils % 61.2 %    Lymphocytes % 15.0 %    Monocytes % 16.3 %    Eosinophils % 6.9 %    Basophils % 0.6 %    Neutrophils # 3.5 1.7 - 7.7 K/uL    Lymphocytes # 0.8 (L) 1.0 - 5.1 K/uL    Monocytes # 0.9 0.0 - 1.3 K/uL    Eosinophils # 0.4 0.0 - 0.6 K/uL    Basophils # 0.0 0.0 - 0.2 K/uL   Protime-INR   Result Value Ref Range    Protime 15.3 (H) 9.8 - 13.0 sec    INR 1.34 (H) 0.86 - 5.77   Basic Metabolic Panel w/ Reflex to MG   Result Value Ref Range    Sodium 128 (L) 136 - 145 mmol/L    Potassium reflex Magnesium 4.2 3.5 - 5.1 mmol/L    Chloride 93 (L) 99 - 110 mmol/L    CO2 23 21 - 32 mmol/L    Anion Gap 12 3 - 16    Glucose 190 (H) 70 - 99 mg/dL    BUN 20 7 - 20 mg/dL    CREATININE 1.3 0.8 - 1.3 mg/dL    GFR Non- 53 (A) >60    GFR African American >60 >60    Calcium 8.3 8.3 - 10.6 mg/dL   CBC auto differential   Result Value Ref Range    WBC 9.5 4.0 - 11.0 K/uL    RBC 3.72 (L) 4.20 - 5.90 M/uL    Hemoglobin 11.3 (L) 13.5 - 17.5 g/dL    Hematocrit 32.6 (L) 40.5 - 52.5 %    MCV 87.5 80.0 - 100.0 fL    MCH 30.3 26.0 - 34.0 pg    MCHC 34.7 31.0 - 36.0 g/dL    RDW 14.3 12.4 - 15.4 %    Platelets 98 (L) 020 - 450 K/uL    MPV 9.6 5.0 - 10.5 fL    PLATELET SLIDE REVIEW Decreased     Neutrophils % 84.0 %    Lymphocytes % 6.7 %    Monocytes % 8.2 %    Eosinophils % 0.2 %    Basophils % 0.9 %    Neutrophils # 8.0 (H) 1.7 - 7.7 K/uL    Lymphocytes # 0.6 (L) 1.0 - 5.1 K/uL    Monocytes # 0.8 0.0 - 1.3 K/uL    Eosinophils # 0.0 0.0 - 0.6 K/uL    Basophils # 0.1 0.0 - 0.2 K/uL    RBC Morphology Normal    Protime-INR   Result Value Ref Range    Protime 13.4 (H) 9.8 - 13.0 sec    INR 1.18 (H) 0.86 - 5.36   Basic Metabolic Panel w/ Reflex to MG   Result Value Ref Range    Sodium 124 (L) 136 - 145 mmol/L    Potassium reflex Magnesium 3.8 3.5 - 5.1 mmol/L    Chloride 88 (L) 99 - 110 mmol/L    CO2 23 21 - 32 mmol/L    Anion Gap 13 3 - 16    Glucose 122 (H) 70 - 99 mg/dL    BUN 27 (H) 7 - 20 mg/dL    CREATININE 1.4 (H) 0.8 - 1.3 mg/dL    GFR Non- 48 (A) >60    GFR  59 (A) >60    Calcium 8.2 (L) 8.3 - 10.6 mg/dL   CBC auto differential   Result Value Ref Range    WBC 6.3 4.0 - 11.0 K/uL    RBC 3.59 (L) 4.20 - 5.90 M/uL    Hemoglobin 11.0 (L) 13.5 - 17.5 g/dL    Hematocrit 31.3 (L) 40.5 - 52.5 %    MCV 87.4 80.0 - 100.0 fL    MCH 30.6 26.0 - 34.0 pg    MCHC 35.0 31.0 - 36.0 g/dL    RDW 14.3 12.4 - 15.4 %    Platelets 82 (L) 999 - 450 K/uL    MPV 8.6 5.0 - 10.5 fL    Neutrophils % 70.9 %    Lymphocytes % 12.8 %    Monocytes % 15.4 %    Eosinophils % 0.8 %    Basophils % 0.1 %    Neutrophils # 4.5 1.7 - 7.7 K/uL    Lymphocytes # 0.8 (L) 1.0 - 5.1 K/uL    Monocytes # 1.0 0.0 - 1.3 K/uL    Eosinophils # 0.1 0.0 - 0.6 K/uL    Basophils # 0.0 0.0 - 0.2 K/uL   Protime-INR   Result Value Ref Range    Protime 15.2 (H) 9.8 - 13.0 sec    INR 1.33 (H) 0.86 - 1.14   TSH without Reflex   Result Value Ref Range    TSH 3.25 0.27 - 4.20 uIU/mL   POCT Glucose   Result Value Ref Range    POC Glucose 115 (H) 70 - 99 mg/dl    Performed on ACCU-CHEK    POCT Glucose   Result Value Ref Range    POC Glucose 161 (H) 70 - 99 mg/dl    Performed on ACCU-CHEK    EKG 12 Lead   Result Value Ref Range    Ventricular Rate 79 BPM    Atrial Rate 79 BPM    P-R Interval 192 ms    QRS Duration 90 ms    Q-T Interval 384 ms    QTc Calculation (Bazett) 440 ms    P Axis 47 degrees    R Axis -63 degrees    T Axis -30 degrees    Diagnosis       Normal sinus rhythmLeft axis deviationInferior infarct (cited on or before 05-MAY-2017)Poor R wave progressionAbnormal ECGWhen compared with ECG of 05-MAY-2017 09:14,Sinus rhythm has replaced Electronic atrial pacemakerQRS duration has decreasedConfirmed by Ulices Fam MD, [facility] ([id number]) on 3/25/2019 4:26:46 PM   TYPE AND SCREEN   Result Value Ref Range    ABO/Rh AB POS     Antibody Screen NEG        Diet:  DIET GENERAL; Daily Fluid Restriction: 1500 ml    Activity:  Activity as tolerated (Patient may move about with assist as indicated or with supervision.)    Follow-up:   with SADIE THAO    Disposition: SNF    Condition: Stable      Time Spent: 35 minutes    Electronically signed by Ayala Bustillo MD on 3/27/2019 at 9:43 AM    Discharging Hospitalist

## 2019-03-27 NOTE — PROGRESS NOTES
Clinical Pharmacy Note  Warfarin Consult    Pattie Butler is a 81 y.o. male receiving warfarin managed by pharmacy. Patient being bridged with enoxaparin 30mg bid. Warfarin Indication: atrial fib  Target INR range: 2-3   Dose prior to admission: 2.5mg Mondays, 5mg all other days    Current warfarin drug-drug interactions: none    Recent Labs     03/25/19  0502 03/26/19  0526 03/27/19  0525   HGB 11.8* 11.3* 11.0*   HCT 34.0* 32.6* 31.3*   INR 1.34* 1.18* 1.33*       Assessment/Plan:    Warfarin 5 mg today prior to discharge. Daily PT/INR until stable within therapeutic range. Thank you for the consult. Will continue to follow. REGINA DURON, PHARM. D  3/27/2019  11:44 AM

## 2019-03-27 NOTE — PROGRESS NOTES
Placeholder  ALPRAZolam (XANAX) tablet 0.25 mg, 0.25 mg, Oral, Nightly PRN  tamsulosin (FLOMAX) capsule 0.4 mg, 0.4 mg, Oral, Daily  pantoprazole (PROTONIX) tablet 40 mg, 40 mg, Oral, QAM AC  cetirizine (ZYRTEC) tablet 10 mg, 10 mg, Oral, Daily  furosemide (LASIX) tablet 40 mg, 40 mg, Oral, Daily  carvedilol (COREG) tablet 12.5 mg, 12.5 mg, Oral, BID WC  atorvastatin (LIPITOR) tablet 20 mg, 20 mg, Oral, Daily  sodium chloride flush 0.9 % injection 10 mL, 10 mL, Intravenous, 2 times per day  magnesium hydroxide (MILK OF MAGNESIA) 400 MG/5ML suspension 30 mL, 30 mL, Oral, Daily PRN  ondansetron (ZOFRAN) injection 4 mg, 4 mg, Intravenous, Q4H PRN  acetaminophen (TYLENOL) tablet 650 mg, 650 mg, Oral, Q4H PRN  losartan (COZAAR) tablet 25 mg, 25 mg, Oral, Daily    ASSESSMENT AND PLAN    Post left hip pinning, stable exam  DVT prophylaxis ordered, Lovenox as bridge to Coumadin as prior to surgery  PT OT for ADL's and ambulation as tolerated. WBAT  SS for DC planning,ECF when arranged.  Stable for DC per ortho and FU in 2 weeks in office  IV or PO pain med as ordered    Shreyas Wilkerson  3/27/2019  10:37 AM

## 2019-03-27 NOTE — CARE COORDINATION
Patient discharged; daughter will transport to [facility] today as per plan. Hens completed; orders faxed.     Electronically signed by Jyoti Galarza on 3/27/2019 at 1:14 PM   #[id number]

## 2019-03-27 NOTE — PROGRESS NOTES
Checking on patient Q2H for nutrition needs, hygiene needs, comfort measures, mobility, fall risk interventions, and safe environment. All precautions and interventions in place. Educated patient on use of call light and telephone. Patient verbalizes understanding. Call light/telephone in reach. Morning assessment and medications are complete.

## 2019-03-27 NOTE — PROGRESS NOTES
Report called to [facility] at [facility] [phone]. Patient was discharged via wheelchair to private car with PT/OT help. Daughter to transport patient, patient has packet and scripts in hand.

## 2019-03-28 ENCOUNTER — ANTI-COAG VISIT (OUTPATIENT)
Dept: PHARMACY | Age: 84
End: 2019-03-28

## 2019-03-28 DIAGNOSIS — S72.002A HIP FRACTURE REQUIRING OPERATIVE REPAIR, LEFT, CLOSED, INITIAL ENCOUNTER (HCC): ICD-10-CM

## 2019-03-28 LAB — INTERNATIONAL NORMALIZATION RATIO, POC: 1.5

## 2019-04-04 ENCOUNTER — ANTI-COAG VISIT (OUTPATIENT)
Dept: PHARMACY | Age: 84
End: 2019-04-04
Payer: MEDICARE

## 2019-04-04 DIAGNOSIS — S72.002A HIP FRACTURE REQUIRING OPERATIVE REPAIR, LEFT, CLOSED, INITIAL ENCOUNTER (HCC): ICD-10-CM

## 2019-04-04 LAB — INTERNATIONAL NORMALIZATION RATIO, POC: 1.9

## 2019-04-08 ENCOUNTER — OFFICE VISIT (OUTPATIENT)
Dept: ORTHOPEDIC SURGERY | Age: 84
End: 2019-04-08

## 2019-04-08 VITALS — HEIGHT: 66 IN | WEIGHT: 173 LBS | BODY MASS INDEX: 27.8 KG/M2

## 2019-04-08 DIAGNOSIS — S72.002A CLOSED FRACTURE OF NECK OF LEFT FEMUR, INITIAL ENCOUNTER (HCC): Primary | ICD-10-CM

## 2019-04-08 PROCEDURE — 99024 POSTOP FOLLOW-UP VISIT: CPT | Performed by: ORTHOPAEDIC SURGERY

## 2019-04-08 NOTE — PROGRESS NOTES
Jackie Rider  [id number]  April 8, 2019    Chief Complaint   Patient presents with   [facility] Post-Op Check     Closed reduction and percutaneous pinning of the Left hip; DOS 3/25/19. History:  The patient is an 81-year-old male here today for evaluation regarding his left closed reduction and percutaneous pain completed on 3/25/19. He is recovering at [facility]. He is ambulating with a walker. He was ambulating without assistive devices prior to the injury. He rates his pain 6/10 today and describes mainly muscular pain. He denies numbness or tingling. We did remove his staples today. He is able to take Norco for pain control based upon the orders provided from the SNF today. The patient's  past medical history, medications, allergies,  family history, social history, and review of systems have been reviewed, and dated and are recorded in the chart. Ht 5' 6\" (1.676 m)   Wt 173 lb (78.5 kg)   BMI 27.92 kg/m²     Physical:       General:  alert, appears stated age and cooperative                                    Range of motion of the left hip is not painful. The incision is healed. Leg length discrepancy:  none     There is No evidence of DVT seen on physical exam.    Pain with resisted hip flexion  No    Iliopsoas tenderness   negative   Examination of the skin reveals no rashes, ulcerations, or lesions, bilaterally in the lower extremities. Sensation to both lower extremities is grossly intact. Exam of both feet reveals pedal pulses intact and brisk cap refill. Patient is able to dorsiflex and wiggle all toes. Deep tendon reflexes of the lower extremities are normal and symmetric. X-Rays:  2 views of the Left hip was obtained and reviewed today. The hardware is in good position.  Fracture position acceptable with healing appropriate for time frame    Impression:Left hip percutaneous pinning      Plan: He will continue to work on general strengthening and ambulation. Follow up will be in 4 week(s)  and 2 views of the left hip will be obtained. He may continue to weight bear as tolerated.

## 2019-04-11 ENCOUNTER — ANTI-COAG VISIT (OUTPATIENT)
Dept: PHARMACY | Age: 84
End: 2019-04-11
Payer: MEDICARE

## 2019-04-11 DIAGNOSIS — S72.002A HIP FRACTURE REQUIRING OPERATIVE REPAIR, LEFT, CLOSED, INITIAL ENCOUNTER (HCC): ICD-10-CM

## 2019-04-11 LAB — INTERNATIONAL NORMALIZATION RATIO, POC: 1.9

## 2019-04-11 RX ORDER — WARFARIN SODIUM 2.5 MG/1
5-7.5 TABLET ORAL EVERY EVENING
COMMUNITY

## 2019-04-11 NOTE — PROGRESS NOTES
Gladys Noguera called to report an INR of 1.9, please call with instructions to [phone]. Patient going home today with [facility] [phone]. He was previously managed by [facility] physicians and would like to continue with them before his ABEL.

## 2019-04-15 ENCOUNTER — TELEPHONE (OUTPATIENT)
Dept: PHARMACY | Age: 84
End: 2019-04-15

## 2019-04-15 NOTE — TELEPHONE ENCOUNTER
Patient's inr results will now be given to Dr. Dinorah Hobbs as before. He is back home after recent hospital and rehab stay where we had been dosing.

## 2019-05-08 ENCOUNTER — OFFICE VISIT (OUTPATIENT)
Dept: ORTHOPEDIC SURGERY | Age: 84
End: 2019-05-08

## 2019-05-08 VITALS — BODY MASS INDEX: 27.8 KG/M2 | HEIGHT: 66 IN | WEIGHT: 173 LBS

## 2019-05-08 DIAGNOSIS — S72.002A CLOSED FRACTURE OF NECK OF LEFT FEMUR, INITIAL ENCOUNTER (HCC): Primary | ICD-10-CM

## 2019-05-08 PROCEDURE — 99024 POSTOP FOLLOW-UP VISIT: CPT | Performed by: ORTHOPAEDIC SURGERY

## 2019-05-08 NOTE — PROGRESS NOTES
Lin Brown  [id number]  May 8, 2019    Chief Complaint   Patient presents with    Post-Op Check     DOS 3/25/19 left hip percutaneous pinning         History:  The patient is an 56-year-old male here today for evaluation regarding his left closed reduction and percutaneous pain completed on 3/25/19. He did return home and is progressing well. He reportedly finished his home physical therapy yesterday. He is ambulating with a cane. The patient's  past medical history, medications, allergies,  family history, social history, and review of systems have been reviewed, and dated and are recorded in the chart. Ht 5' 6\" (1.676 m)   Wt 173 lb (78.5 kg)   BMI 27.92 kg/m²     Physical:       General:  alert, appears stated age and cooperative                                    Range of motion of the left hip is not painful. The incision is healed. Leg length discrepancy:  none     There is No evidence of DVT seen on physical exam.    Pain with resisted hip flexion  No    Iliopsoas tenderness   negative   Examination of the skin reveals no rashes, ulcerations, or lesions, bilaterally in the lower extremities. Sensation to both lower extremities is grossly intact. Exam of both feet reveals pedal pulses intact and brisk cap refill. Patient is able to dorsiflex and wiggle all toes. Deep tendon reflexes of the lower extremities are normal and symmetric. The patient does have moderate left lower extremity swelling. X-Rays:  2 views of the Left hip was obtained and reviewed today. The hardware is in good position. Fracture position acceptable with healing appropriate for time frame. There has been evidence of mild collapse at the fracture site. Impression:Left hip percutaneous pinning      Plan: He will continue to work on general strengthening and ambulation. Patient was also instructed to work on his balance. He will also try ambulating without his cane at  home.   At follow-up, 2 views of the left hip will be obtain

## 2019-05-09 ENCOUNTER — TELEPHONE (OUTPATIENT)
Dept: ORTHOPEDIC SURGERY | Age: 84
End: 2019-05-09

## 2019-05-09 NOTE — TELEPHONE ENCOUNTER
Patient daughter Haley Joseph called she is wondering if patient is able to start driving again?     Please call Haley Joseph to discuss:  [phone]

## 2019-05-09 NOTE — TELEPHONE ENCOUNTER
I left a message for Sanket Pradhan to inform her that Selina Chan is allowed to start driving per Dr. Jose Weber.

## 2019-06-19 ENCOUNTER — OFFICE VISIT (OUTPATIENT)
Dept: ORTHOPEDIC SURGERY | Age: 84
End: 2019-06-19

## 2019-06-19 VITALS — HEIGHT: 66 IN | WEIGHT: 176 LBS | BODY MASS INDEX: 28.28 KG/M2

## 2019-06-19 DIAGNOSIS — S72.002A CLOSED FRACTURE OF NECK OF LEFT FEMUR, INITIAL ENCOUNTER (HCC): Primary | ICD-10-CM

## 2019-06-19 PROCEDURE — 99024 POSTOP FOLLOW-UP VISIT: CPT | Performed by: ORTHOPAEDIC SURGERY

## 2019-06-19 NOTE — PROGRESS NOTES
Cordelia Jernigan  [id number]  June 19, 2019    Chief Complaint   Patient presents with    Hip Pain     L hip ORIF 03/25/2019         History:  The patient is an 52-year-old male here today for evaluation regarding his left closed reduction and percutaneous pain completed on 3/25/19. He is having significant back pain. He is having no pain in the left hip. He is ambulating with a cane due to his back issues. The patient's  past medical history, medications, allergies,  family history, social history, and review of systems have been reviewed, and dated and are recorded in the chart. Ht 5' 6\" (1.676 m)   Wt 176 lb (79.8 kg)   BMI 28.41 kg/m²     Physical:       General:  alert, appears stated age and cooperative                                    Range of motion of the left hip is not painful. The incision is healed. Leg length discrepancy:  none     There is No evidence of DVT seen on physical exam.    Pain with resisted hip flexion  No    Iliopsoas tenderness   negative   Examination of the skin reveals no rashes, ulcerations, or lesions, bilaterally in the lower extremities. Sensation to both lower extremities is grossly intact. Exam of both feet reveals pedal pulses intact and brisk cap refill. Patient is able to dorsiflex and wiggle all toes. Deep tendon reflexes of the lower extremities are normal and symmetric. The patient does have mild left lower extremity swelling. The patient does have diffuse perilumbar spinal tightness and tenderness. X-Rays:  AP pelvis and 2 views of the Left hip were obtained and reviewed today. The hardware is in good position. Fracture position acceptable with healing appropriate for time frame. There has been no further collapse at the fracture site. There is no sign of avascular necrosis. The fracture is essentially healed. The AP pelvis x-ray does reveal severe degenerative disc disease.     Impression:Left hip percutaneous pinning      Plan: He will continue to work on general strengthening and ambulation. We will refer the patient to the spine team.  Due to the back issues, he will continue using a cane for comfort. He will follow-up with me on a as needed basis.

## 2019-07-16 ENCOUNTER — HOSPITAL ENCOUNTER (INPATIENT)
Age: 84
LOS: 1 days | Discharge: HOME | DRG: 392 | End: 2019-07-17
Attending: EMERGENCY MEDICINE | Admitting: INTERNAL MEDICINE
Payer: MEDICARE

## 2019-07-16 ENCOUNTER — APPOINTMENT (OUTPATIENT)
Dept: CT IMAGING | Age: 84
DRG: 392 | End: 2019-07-16
Payer: MEDICARE

## 2019-07-16 ENCOUNTER — APPOINTMENT (OUTPATIENT)
Dept: GENERAL RADIOLOGY | Age: 84
DRG: 392 | End: 2019-07-16
Payer: MEDICARE

## 2019-07-16 PROBLEM — R10.13 EPIGASTRIC PAIN: Status: ACTIVE | Noted: 2019-07-16

## 2019-07-16 PROBLEM — R06.02 SHORTNESS OF BREATH: Status: ACTIVE | Noted: 2019-07-16

## 2019-07-16 PROBLEM — Z95.810 PRESENCE OF CARDIAC DEFIBRILLATOR: Status: ACTIVE | Noted: 2019-07-16

## 2019-07-16 PROBLEM — R77.8 ELEVATED TROPONIN: Status: ACTIVE | Noted: 2019-07-16

## 2019-07-16 PROBLEM — K29.70 GASTRITIS WITHOUT BLEEDING: Status: ACTIVE | Noted: 2019-07-16

## 2019-07-16 PROBLEM — R07.9 CHEST PAIN: Status: ACTIVE | Noted: 2019-07-16

## 2019-07-16 LAB
A/G RATIO: 1.9 (ref 1.1–2.2)
ALBUMIN SERPL-MCNC: 4.3 G/DL (ref 3.4–5)
ALP BLD-CCNC: 105 U/L (ref 40–129)
ALT SERPL-CCNC: 15 U/L (ref 10–40)
ANION GAP SERPL CALCULATED.3IONS-SCNC: 16 MMOL/L (ref 3–16)
AST SERPL-CCNC: 23 U/L (ref 15–37)
BASOPHILS ABSOLUTE: 0 K/UL (ref 0–0.2)
BASOPHILS RELATIVE PERCENT: 0.8 %
BILIRUB SERPL-MCNC: 0.6 MG/DL (ref 0–1)
BILIRUBIN URINE: NEGATIVE
BLOOD, URINE: NEGATIVE
BUN BLDV-MCNC: 31 MG/DL (ref 7–20)
CALCIUM SERPL-MCNC: 9.1 MG/DL (ref 8.3–10.6)
CHLORIDE BLD-SCNC: 93 MMOL/L (ref 99–110)
CLARITY: CLEAR
CO2: 23 MMOL/L (ref 21–32)
COLOR: YELLOW
CREAT SERPL-MCNC: 1.5 MG/DL (ref 0.8–1.3)
EOSINOPHILS ABSOLUTE: 0.1 K/UL (ref 0–0.6)
EOSINOPHILS RELATIVE PERCENT: 2.4 %
GFR AFRICAN AMERICAN: 54
GFR NON-AFRICAN AMERICAN: 45
GLOBULIN: 2.3 G/DL
GLUCOSE BLD-MCNC: 109 MG/DL (ref 70–99)
GLUCOSE URINE: NEGATIVE MG/DL
HCT VFR BLD CALC: 35.1 % (ref 40.5–52.5)
HEMOGLOBIN: 11.9 G/DL (ref 13.5–17.5)
INR BLD: 3.4 (ref 0.86–1.14)
KETONES, URINE: NEGATIVE MG/DL
LACTIC ACID: 0.7 MMOL/L (ref 0.4–2)
LEUKOCYTE ESTERASE, URINE: NEGATIVE
LIPASE: 31 U/L (ref 13–60)
LYMPHOCYTES ABSOLUTE: 1.1 K/UL (ref 1–5.1)
LYMPHOCYTES RELATIVE PERCENT: 24.1 %
MCH RBC QN AUTO: 30.5 PG (ref 26–34)
MCHC RBC AUTO-ENTMCNC: 33.9 G/DL (ref 31–36)
MCV RBC AUTO: 90.1 FL (ref 80–100)
MICROSCOPIC EXAMINATION: NORMAL
MONOCYTES ABSOLUTE: 0.5 K/UL (ref 0–1.3)
MONOCYTES RELATIVE PERCENT: 12 %
NEUTROPHILS ABSOLUTE: 2.7 K/UL (ref 1.7–7.7)
NEUTROPHILS RELATIVE PERCENT: 60.7 %
NITRITE, URINE: NEGATIVE
PDW BLD-RTO: 16.2 % (ref 12.4–15.4)
PH UA: 7 (ref 5–8)
PLATELET # BLD: 101 K/UL (ref 135–450)
PMV BLD AUTO: 8.9 FL (ref 5–10.5)
POTASSIUM REFLEX MAGNESIUM: 4.2 MMOL/L (ref 3.5–5.1)
PRO-BNP: 3540 PG/ML (ref 0–449)
PROTEIN UA: NEGATIVE MG/DL
PROTHROMBIN TIME: 38.8 SEC (ref 9.8–13)
RBC # BLD: 3.89 M/UL (ref 4.2–5.9)
SODIUM BLD-SCNC: 132 MMOL/L (ref 136–145)
SPECIFIC GRAVITY UA: 1.01 (ref 1–1.03)
TOTAL PROTEIN: 6.6 G/DL (ref 6.4–8.2)
TROPONIN: 0.03 NG/ML
TROPONIN: 0.03 NG/ML
URINE REFLEX TO CULTURE: NORMAL
URINE TYPE: NORMAL
UROBILINOGEN, URINE: 0.2 E.U./DL
WBC # BLD: 4.5 K/UL (ref 4–11)

## 2019-07-16 PROCEDURE — 84484 ASSAY OF TROPONIN QUANT: CPT

## 2019-07-16 PROCEDURE — 93005 ELECTROCARDIOGRAM TRACING: CPT | Performed by: EMERGENCY MEDICINE

## 2019-07-16 PROCEDURE — 80053 COMPREHEN METABOLIC PANEL: CPT

## 2019-07-16 PROCEDURE — 1200000000 HC SEMI PRIVATE

## 2019-07-16 PROCEDURE — 6360000002 HC RX W HCPCS: Performed by: NURSE PRACTITIONER

## 2019-07-16 PROCEDURE — 2580000003 HC RX 258: Performed by: INTERNAL MEDICINE

## 2019-07-16 PROCEDURE — 85025 COMPLETE CBC W/AUTO DIFF WBC: CPT

## 2019-07-16 PROCEDURE — 85610 PROTHROMBIN TIME: CPT

## 2019-07-16 PROCEDURE — 83880 ASSAY OF NATRIURETIC PEPTIDE: CPT

## 2019-07-16 PROCEDURE — 81003 URINALYSIS AUTO W/O SCOPE: CPT

## 2019-07-16 PROCEDURE — 6370000000 HC RX 637 (ALT 250 FOR IP): Performed by: INTERNAL MEDICINE

## 2019-07-16 PROCEDURE — 96375 TX/PRO/DX INJ NEW DRUG ADDON: CPT

## 2019-07-16 PROCEDURE — 71046 X-RAY EXAM CHEST 2 VIEWS: CPT

## 2019-07-16 PROCEDURE — 83605 ASSAY OF LACTIC ACID: CPT

## 2019-07-16 PROCEDURE — 99285 EMERGENCY DEPT VISIT HI MDM: CPT

## 2019-07-16 PROCEDURE — 6360000002 HC RX W HCPCS: Performed by: INTERNAL MEDICINE

## 2019-07-16 PROCEDURE — 36415 COLL VENOUS BLD VENIPUNCTURE: CPT

## 2019-07-16 PROCEDURE — 74176 CT ABD & PELVIS W/O CONTRAST: CPT

## 2019-07-16 PROCEDURE — 96374 THER/PROPH/DIAG INJ IV PUSH: CPT

## 2019-07-16 PROCEDURE — 83690 ASSAY OF LIPASE: CPT

## 2019-07-16 PROCEDURE — C9113 INJ PANTOPRAZOLE SODIUM, VIA: HCPCS | Performed by: INTERNAL MEDICINE

## 2019-07-16 RX ORDER — TAMSULOSIN HYDROCHLORIDE 0.4 MG/1
0.4 CAPSULE ORAL DAILY
Status: DISCONTINUED | OUTPATIENT
Start: 2019-07-16 | End: 2019-07-16 | Stop reason: ALTCHOICE

## 2019-07-16 RX ORDER — ACETAMINOPHEN 325 MG/1
650 TABLET ORAL EVERY 4 HOURS PRN
Status: DISCONTINUED | OUTPATIENT
Start: 2019-07-16 | End: 2019-07-17 | Stop reason: HOSPADM

## 2019-07-16 RX ORDER — MORPHINE SULFATE 2 MG/ML
2 INJECTION, SOLUTION INTRAMUSCULAR; INTRAVENOUS ONCE
Status: COMPLETED | OUTPATIENT
Start: 2019-07-16 | End: 2019-07-16

## 2019-07-16 RX ORDER — FUROSEMIDE 40 MG/1
40 TABLET ORAL 2 TIMES DAILY
Status: DISCONTINUED | OUTPATIENT
Start: 2019-07-16 | End: 2019-07-17 | Stop reason: HOSPADM

## 2019-07-16 RX ORDER — ONDANSETRON 2 MG/ML
4 INJECTION INTRAMUSCULAR; INTRAVENOUS EVERY 4 HOURS PRN
Status: DISCONTINUED | OUTPATIENT
Start: 2019-07-16 | End: 2019-07-17 | Stop reason: HOSPADM

## 2019-07-16 RX ORDER — CETIRIZINE HYDROCHLORIDE 10 MG/1
5 TABLET ORAL DAILY
Status: DISCONTINUED | OUTPATIENT
Start: 2019-07-16 | End: 2019-07-17 | Stop reason: HOSPADM

## 2019-07-16 RX ORDER — SODIUM CHLORIDE 0.9 % (FLUSH) 0.9 %
10 SYRINGE (ML) INJECTION PRN
Status: DISCONTINUED | OUTPATIENT
Start: 2019-07-16 | End: 2019-07-17 | Stop reason: HOSPADM

## 2019-07-16 RX ORDER — 0.9 % SODIUM CHLORIDE 0.9 %
10 VIAL (ML) INJECTION 2 TIMES DAILY
Status: DISCONTINUED | OUTPATIENT
Start: 2019-07-16 | End: 2019-07-17 | Stop reason: HOSPADM

## 2019-07-16 RX ORDER — CARVEDILOL 12.5 MG/1
12.5 TABLET ORAL 2 TIMES DAILY
Status: DISCONTINUED | OUTPATIENT
Start: 2019-07-16 | End: 2019-07-17 | Stop reason: HOSPADM

## 2019-07-16 RX ORDER — PANTOPRAZOLE SODIUM 40 MG/1
40 TABLET, DELAYED RELEASE ORAL
Status: DISCONTINUED | OUTPATIENT
Start: 2019-07-17 | End: 2019-07-16 | Stop reason: SDUPTHER

## 2019-07-16 RX ORDER — PANTOPRAZOLE SODIUM 40 MG/10ML
40 INJECTION, POWDER, LYOPHILIZED, FOR SOLUTION INTRAVENOUS 2 TIMES DAILY
Status: DISCONTINUED | OUTPATIENT
Start: 2019-07-16 | End: 2019-07-17 | Stop reason: HOSPADM

## 2019-07-16 RX ORDER — SODIUM CHLORIDE 0.9 % (FLUSH) 0.9 %
10 SYRINGE (ML) INJECTION EVERY 12 HOURS SCHEDULED
Status: DISCONTINUED | OUTPATIENT
Start: 2019-07-16 | End: 2019-07-17 | Stop reason: HOSPADM

## 2019-07-16 RX ORDER — ATORVASTATIN CALCIUM 20 MG/1
20 TABLET, FILM COATED ORAL DAILY
Status: DISCONTINUED | OUTPATIENT
Start: 2019-07-16 | End: 2019-07-17 | Stop reason: HOSPADM

## 2019-07-16 RX ORDER — SODIUM CHLORIDE 9 MG/ML
INJECTION, SOLUTION INTRAVENOUS CONTINUOUS
Status: DISCONTINUED | OUTPATIENT
Start: 2019-07-17 | End: 2019-07-17 | Stop reason: HOSPADM

## 2019-07-16 RX ORDER — LOSARTAN POTASSIUM 25 MG/1
25 TABLET ORAL DAILY
Status: DISCONTINUED | OUTPATIENT
Start: 2019-07-16 | End: 2019-07-17 | Stop reason: HOSPADM

## 2019-07-16 RX ADMIN — SODIUM CHLORIDE: 9 INJECTION, SOLUTION INTRAVENOUS at 22:11

## 2019-07-16 RX ADMIN — ATORVASTATIN CALCIUM 20 MG: 20 TABLET, FILM COATED ORAL at 20:54

## 2019-07-16 RX ADMIN — FUROSEMIDE 40 MG: 40 TABLET ORAL at 20:40

## 2019-07-16 RX ADMIN — CARVEDILOL 12.5 MG: 12.5 TABLET, FILM COATED ORAL at 20:40

## 2019-07-16 RX ADMIN — PANTOPRAZOLE SODIUM 40 MG: 40 INJECTION, POWDER, FOR SOLUTION INTRAVENOUS at 20:40

## 2019-07-16 RX ADMIN — Medication 10 ML: at 20:56

## 2019-07-16 RX ADMIN — SODIUM CHLORIDE, PRESERVATIVE FREE 10 ML: 5 INJECTION INTRAVENOUS at 21:00

## 2019-07-16 RX ADMIN — MORPHINE SULFATE 2 MG: 2 INJECTION, SOLUTION INTRAMUSCULAR; INTRAVENOUS at 20:40

## 2019-07-16 RX ADMIN — CETIRIZINE HYDROCHLORIDE 5 MG: 10 TABLET, FILM COATED ORAL at 22:40

## 2019-07-16 RX ADMIN — LOSARTAN POTASSIUM 25 MG: 25 TABLET ORAL at 20:54

## 2019-07-16 ASSESSMENT — ENCOUNTER SYMPTOMS
BACK PAIN: 0
EYE REDNESS: 0
NAUSEA: 0
EYE PAIN: 0
VOMITING: 0
WHEEZING: 0
SHORTNESS OF BREATH: 1
DIARRHEA: 0
ABDOMINAL PAIN: 1
RHINORRHEA: 0
SORE THROAT: 0
EYE DISCHARGE: 0
COUGH: 0

## 2019-07-16 ASSESSMENT — PAIN DESCRIPTION - ORIENTATION: ORIENTATION: MID

## 2019-07-16 ASSESSMENT — PAIN DESCRIPTION - LOCATION
LOCATION: ABDOMEN
LOCATION: ABDOMEN;CHEST

## 2019-07-16 ASSESSMENT — PAIN DESCRIPTION - FREQUENCY
FREQUENCY: CONTINUOUS
FREQUENCY: CONTINUOUS

## 2019-07-16 ASSESSMENT — PAIN DESCRIPTION - DESCRIPTORS
DESCRIPTORS: BURNING
DESCRIPTORS: BURNING

## 2019-07-16 ASSESSMENT — PAIN SCALES - GENERAL
PAINLEVEL_OUTOF10: 6
PAINLEVEL_OUTOF10: 6

## 2019-07-16 ASSESSMENT — PAIN - FUNCTIONAL ASSESSMENT: PAIN_FUNCTIONAL_ASSESSMENT: PREVENTS OR INTERFERES SOME ACTIVE ACTIVITIES AND ADLS

## 2019-07-16 ASSESSMENT — PAIN DESCRIPTION - PAIN TYPE
TYPE: ACUTE PAIN
TYPE: ACUTE PAIN

## 2019-07-16 ASSESSMENT — PAIN DESCRIPTION - DIRECTION: RADIATING_TOWARDS: CHEST

## 2019-07-16 ASSESSMENT — PAIN DESCRIPTION - ONSET
ONSET: ON-GOING
ONSET: ON-GOING

## 2019-07-16 ASSESSMENT — PAIN DESCRIPTION - PROGRESSION: CLINICAL_PROGRESSION: NOT CHANGED

## 2019-07-16 NOTE — H&P
Hospital Medicine  History and Physical    PCP: [address] [place] [address]  Patient Name: Camila Otero    Date of Service: Pt seen/examined on 07/16/2019 and Admitted to Inpatient with expected LOS greater than two midnights due to medical therapy    CHIEF COMPLAINT:  Pt c/o epigastric abdominal pain, chest pain in the lower aspect of his chest  HISTORY OF PRESENT ILLNESS: Patient is an 81-year-old man with history of hypertension, hyperlipidemia, diabetes mellitus and atrial fibrillation who presents to the emergency room for evaluation following a two day history of moderately severe pain in his epigastric and lower chest area. He reports eating cherries a few days back and his symptoms started shortly after that, and he wonders if the cherries were bad. He reports moderately severe, burning pain in his epigastric area and low chest area. In addition to this, he believes that his pacemaker fired last night (it was interrogated and it did not), and he reports some mild shortness of breath with occasional coughing. After evaluation in the emergency room it was determined that the most likely etiology of his symptoms is gastritis. He has been admitted for further evaluation and treatment. Associated signs and symptoms do not include fever or chills, nausea, vomiting, diarrhea, melena, hematochezia, hematemesis, sweats, dark urine or jaundice.       Past Medical History:        Diagnosis Date    Atrial fibrillation ([address]     Diabetes mellitus ([address]     unsure if diarbetic per patient    Hypertension     MI (mitral incompetence)        Past Surgical History:        Procedure Laterality Date    BACK SURGERY      CARDIAC DEFIBRILLATOR PLACEMENT      CORONARY ANGIOPLASTY WITH STENT PLACEMENT      HERNIA REPAIR      HIP PINNING Left 3/25/2019    CLOSED REDUCTION PERCUTANEOUS LEFT HIP PINNING performed by Jazmine Gonsales MD at [address] [place] [address] 03/25/2019    CLOSED REDUCTION PERCUTANEOUS LEFT HIP PINNING Exam:    Vitals: BP (!) 175/83   Pulse 61   Temp 97.4 °F (36.3 °C) (Oral)   Resp 24   Ht 5' 4\" (1.626 m)   Wt 164 lb 0.4 oz (74.4 kg)   SpO2 100%   BMI 28.15 kg/m²   General appearance: WD/WN 81y.o. year-old  male who is alert, appears stated age and is cooperative  HEENT: Head: Normocephalic, no lesions, without obvious abnormality. Eye: Normal external eye, conjunctiva, lids cornea, RAYMOND. Ears: Normal external ears. Non-tender. Nose: Normal external nose, mucus membranes and septum. Pharynx: Dental Hygiene adequate. Normal buccal mucosa. Normal pharynx. Neck: no adenopathy, no carotid bruit, no JVD, supple, symmetrical, trachea midline and thyroid not enlarged, symmetric, no tenderness/mass/nodules  Lungs: clear to auscultation bilaterally and no use of accessory muscles. Heart: regular rate and rhythm, S1, S2 normal, no murmur, click, rub or gallop and normal apical impulse  Abdomen: soft, non-tender; bowel sounds normal; no masses,  no organomegaly  Extremities: extremities atraumatic, no cyanosis or edema and Homans sign is negative, no sign of DVT. Capillary Refill: Acceptable < 3 seconds  Peripheral Pulses: +3 easily felt, not easily obliterated with pressures  Skin: Skin color, texture, turgor normal. No rashes or lesions on exposed skin  Neurologic: Neurovascularly intact without any focal sensory/motor deficits. Cranial nerves: II-XII intact, grossly non-focal. Gait was not tested.   Psychiatric: Alert and oriented, thought content appropriate, normal insight    CBC:   Recent Labs     07/16/19  1458   WBC 4.5   HGB 11.9*   *     BMP:    Recent Labs     07/16/19  1458   *   K 4.2   CL 93*   CO2 23   BUN 31*   CREATININE 1.5*   GLUCOSE 109*     Troponin:   Recent Labs     07/16/19  1458   TROPONINI 0.03*     PT/INR:  No results found for: PTINR  U/A:    Lab Results   Component Value Date    LEUKOCYTESUR Negative 07/16/2019    RBCUA 6 10/17/2015    SPECGRAV 1.010 Bones/Soft Tissues: No acute bony abnormality. Severe diffuse lumbar degenerative disease     1. Suspected wall thickening of the stomach. Correlate with any clinical findings of gastritis. No other acute process suggested 2. Cholelithiasis 3. Colonic diverticulosis 4. Prostatic enlargement 5. 5 cm abdominal aortic aneurysm RECOMMENDATIONS: 5.0 cm abdominal aortic aneurysm. Recommend follow-up every 6 months and vascular consultation. Reference: J Am Rafael Radiol 0268;91:908-663. Xr Chest Standard (2 Vw)    Result Date: 7/16/2019  EXAMINATION: TWO XRAY VIEWS OF THE CHEST 7/16/2019 2:17 pm COMPARISON: Prior study(s) most recent 03/23/2019. HISTORY: ORDERING SYSTEM PROVIDED HISTORY: SOB/Epigastric Pain TECHNOLOGIST PROVIDED HISTORY: Reason for exam:->SOB/Epigastric Pain Reason for Exam: epigastric pain Acuity: Acute Type of Exam: Initial FINDINGS: The heart is upper normal size and unchanged. A dual lead left subclavian ICD is unchanged. There is chronic elevation of the right hemidiaphragm. Moderate degenerative spine changes are noted upper lumbar and lower thoracic spine. No acute cardiac or pulmonary disease. Chronic elevation of the right hemidiaphragm. Possibility of diaphragm paralysis could be considered. Xr Hip 2-3 Vw W Pelvis Left    Result Date: 6/19/2019  Radiology exam is complete. No Radiologist dictation. Please follow up with ordering provider. Assessment:   Principal Problem:    Gastritis without bleeding  Active Problems:    Essential hypertension    Hyperlipidemia    DMII (diabetes mellitus, type 2) (HCC)    Atrial fibrillation (HCC)    Epigastric pain    Chest pain    Elevated troponin    Shortness of breath    Presence of cardiac defibrillator  Resolved Problems:    * No resolved hospital problems. *      Plan:       Gastritis without bleeding with epigastric pain, low chest pain - Pt started on Protonix BID, clear liquid diet now, NPO after Midnight.  Gastroenterology has been consulted    Elevated troponin with low chest pain - seems non-cardiac in nature; will follow serial troponins and monitor on Telemetry    Presence of cardiac defibrillator - Interrogated in the ER, and it did not fire    Atrial fibrillation ([address] - rate controlled; continue Coreg and Coumadin    Shortness of breath - mild, without hypoxia. No etiology found. Possibly anxiety due to other symptoms? Monitor    Diabetes mellitus II - Controlled without need for aggressive monitoring or intervention    Essential (primary) hypertension - continue home meds and monitor blood pressure    Hyperlipidemia - No current evidence of Rhabdomyolysis or other adverse effects. Continue statin therapy while in the hospital        DVT Prophylaxis: Coumadin continued  Diet: Clears, NPO after midnight  Code Status: Full  (Advanced care planning has been discussed with patient and/or responsible family member and is reflected in the code status. Further orders associated with this have been entered if appropriate)    Disposition: Anticipate that patient will remain in the hospital for 2 to 3 days depending on further evaluation and clinical course.      Gonzalez Blanton MD

## 2019-07-16 NOTE — ED PROVIDER NOTES
inflammatory stranding. Small stones in the gallbladder with no pericholecystic stranding. GI/Bowel: Possible wall thickening of the stomach, which is suboptimally evaluated due to absence of contrast and distention. Appendix normal. Colonic diverticula with no diverticular inflammatory stranding. Pelvis: Enlarged prostate. Urinary bladder unremarkable. Fat containing left inguinal hernia. No pelvic fluid Peritoneum/Retroperitoneum: 5.0 x 4.7 cm abdominal aortic aneurysm. No retroperitoneal hemorrhage. Mild ectasia of the right common iliac artery. No ascites or pneumoperitoneum. Bones/Soft Tissues: No acute bony abnormality. Severe diffuse lumbar degenerative disease     1. Suspected wall thickening of the stomach. Correlate with any clinical findings of gastritis. No other acute process suggested 2. Cholelithiasis 3. Colonic diverticulosis 4. Prostatic enlargement 5. 5 cm abdominal aortic aneurysm RECOMMENDATIONS: 5.0 cm abdominal aortic aneurysm. Recommend follow-up every 6 months and vascular consultation. Reference: J Am Rafael Radiol 7871;31:045-746. Xr Chest Standard (2 Vw)    Result Date: 7/16/2019  EXAMINATION: TWO XRAY VIEWS OF THE CHEST 7/16/2019 2:17 pm COMPARISON: Prior study(s) most recent 03/23/2019. HISTORY: ORDERING SYSTEM PROVIDED HISTORY: SOB/Epigastric Pain TECHNOLOGIST PROVIDED HISTORY: Reason for exam:->SOB/Epigastric Pain Reason for Exam: epigastric pain Acuity: Acute Type of Exam: Initial FINDINGS: The heart is upper normal size and unchanged. A dual lead left subclavian ICD is unchanged. There is chronic elevation of the right hemidiaphragm. Moderate degenerative spine changes are noted upper lumbar and lower thoracic spine. No acute cardiac or pulmonary disease. Chronic elevation of the right hemidiaphragm. Possibility of diaphragm paralysis could be considered.            PROCEDURES   Unless otherwise noted below, none     Procedures    CRITICAL CARE TIME N/A    CONSULTS:  PHARMACY TO DOSE WARFARIN  IP CONSULT TO GI    EMERGENCY DEPARTMENT COURSE and DIFFERENTIAL DIAGNOSIS/MDM:   Vitals:    Vitals:    07/16/19 1531 07/16/19 1605 07/16/19 1845 07/16/19 1945   BP: (!) 175/83  (!) 126/112 (!) 156/87   Pulse: 60 61 65 62   Resp: 15 24 14 16   Temp:   97.5 °F (36.4 °C) 97.5 °F (36.4 °C)   TempSrc:   Oral Axillary   SpO2: 99% 100% 100% 97%   Weight:       Height:           Patient was given the following medications:  Medications   iopamidol (ISOVUE-370) 76 % injection 75 mL (has no administration in time range)   losartan (COZAAR) tablet 25 mg (25 mg Oral Given 7/16/19 2054)   furosemide (LASIX) tablet 40 mg (40 mg Oral Given 7/16/19 2040)   cetirizine (ZYRTEC) tablet 5 mg (has no administration in time range)   carvedilol (COREG) tablet 12.5 mg (12.5 mg Oral Given 7/16/19 2040)   atorvastatin (LIPITOR) tablet 20 mg (20 mg Oral Given 7/16/19 2054)   sodium chloride flush 0.9 % injection 10 mL (10 mLs Intravenous Given 7/16/19 2100)   sodium chloride flush 0.9 % injection 10 mL (has no administration in time range)   magnesium hydroxide (MILK OF MAGNESIA) 400 MG/5ML suspension 30 mL (has no administration in time range)   ondansetron (ZOFRAN) injection 4 mg (has no administration in time range)   0.9 % sodium chloride infusion (has no administration in time range)   acetaminophen (TYLENOL) tablet 650 mg (has no administration in time range)   pantoprazole (PROTONIX) injection 40 mg (40 mg Intravenous Given 7/16/19 2040)     And   sodium chloride (PF) 0.9 % injection 10 mL (10 mLs Intravenous Given 7/16/19 2056)   warfarin (COUMADIN) daily dosing (placeholder) ( Other Canceled Entry 7/16/19 1954)   morphine (PF) injection 2 mg (2 mg Intravenous Given 7/16/19 2040)       Stable. We did have the pacemaker and defibrillator interrogated. It did not fire and there is been no recent acute events      FINAL IMPRESSION      1. Chest pain, unspecified type    2.  Abdominal pain,

## 2019-07-17 ENCOUNTER — APPOINTMENT (OUTPATIENT)
Dept: ULTRASOUND IMAGING | Age: 84
DRG: 392 | End: 2019-07-17
Payer: MEDICARE

## 2019-07-17 VITALS
HEART RATE: 64 BPM | SYSTOLIC BLOOD PRESSURE: 178 MMHG | WEIGHT: 157.41 LBS | TEMPERATURE: 97.5 F | DIASTOLIC BLOOD PRESSURE: 88 MMHG | BODY MASS INDEX: 26.87 KG/M2 | OXYGEN SATURATION: 99 % | HEIGHT: 64 IN | RESPIRATION RATE: 18 BRPM

## 2019-07-17 LAB
ANION GAP SERPL CALCULATED.3IONS-SCNC: 13 MMOL/L (ref 3–16)
BASOPHILS ABSOLUTE: 0 K/UL (ref 0–0.2)
BASOPHILS RELATIVE PERCENT: 0.5 %
BUN BLDV-MCNC: 28 MG/DL (ref 7–20)
CALCIUM SERPL-MCNC: 9.2 MG/DL (ref 8.3–10.6)
CHLORIDE BLD-SCNC: 95 MMOL/L (ref 99–110)
CO2: 25 MMOL/L (ref 21–32)
CREAT SERPL-MCNC: 1.4 MG/DL (ref 0.8–1.3)
EKG ATRIAL RATE: 60 BPM
EKG DIAGNOSIS: NORMAL
EKG P AXIS: 74 DEGREES
EKG P-R INTERVAL: 184 MS
EKG Q-T INTERVAL: 436 MS
EKG QRS DURATION: 98 MS
EKG QTC CALCULATION (BAZETT): 436 MS
EKG R AXIS: -28 DEGREES
EKG T AXIS: -24 DEGREES
EKG VENTRICULAR RATE: 60 BPM
EOSINOPHILS ABSOLUTE: 0.1 K/UL (ref 0–0.6)
EOSINOPHILS RELATIVE PERCENT: 2 %
GFR AFRICAN AMERICAN: 59
GFR NON-AFRICAN AMERICAN: 48
GLUCOSE BLD-MCNC: 104 MG/DL (ref 70–99)
GLUCOSE BLD-MCNC: 105 MG/DL (ref 70–99)
HCT VFR BLD CALC: 36.9 % (ref 40.5–52.5)
HEMOGLOBIN: 12.7 G/DL (ref 13.5–17.5)
INR BLD: 2.76 (ref 0.86–1.14)
LYMPHOCYTES ABSOLUTE: 0.9 K/UL (ref 1–5.1)
LYMPHOCYTES RELATIVE PERCENT: 19.3 %
MCH RBC QN AUTO: 31.3 PG (ref 26–34)
MCHC RBC AUTO-ENTMCNC: 34.5 G/DL (ref 31–36)
MCV RBC AUTO: 90.8 FL (ref 80–100)
MONOCYTES ABSOLUTE: 0.5 K/UL (ref 0–1.3)
MONOCYTES RELATIVE PERCENT: 11.2 %
NEUTROPHILS ABSOLUTE: 3 K/UL (ref 1.7–7.7)
NEUTROPHILS RELATIVE PERCENT: 67 %
PDW BLD-RTO: 16.1 % (ref 12.4–15.4)
PERFORMED ON: ABNORMAL
PLATELET # BLD: 101 K/UL (ref 135–450)
PMV BLD AUTO: 8.9 FL (ref 5–10.5)
POTASSIUM REFLEX MAGNESIUM: 3.8 MMOL/L (ref 3.5–5.1)
PROTHROMBIN TIME: 31.5 SEC (ref 9.8–13)
RBC # BLD: 4.06 M/UL (ref 4.2–5.9)
SODIUM BLD-SCNC: 133 MMOL/L (ref 136–145)
TROPONIN: 0.03 NG/ML
TROPONIN: 0.04 NG/ML
WBC # BLD: 4.5 K/UL (ref 4–11)

## 2019-07-17 PROCEDURE — 84484 ASSAY OF TROPONIN QUANT: CPT

## 2019-07-17 PROCEDURE — 36415 COLL VENOUS BLD VENIPUNCTURE: CPT

## 2019-07-17 PROCEDURE — C9113 INJ PANTOPRAZOLE SODIUM, VIA: HCPCS | Performed by: INTERNAL MEDICINE

## 2019-07-17 PROCEDURE — 76705 ECHO EXAM OF ABDOMEN: CPT

## 2019-07-17 PROCEDURE — 6360000002 HC RX W HCPCS: Performed by: INTERNAL MEDICINE

## 2019-07-17 PROCEDURE — 93010 ELECTROCARDIOGRAM REPORT: CPT | Performed by: INTERNAL MEDICINE

## 2019-07-17 PROCEDURE — 80048 BASIC METABOLIC PNL TOTAL CA: CPT

## 2019-07-17 PROCEDURE — 96376 TX/PRO/DX INJ SAME DRUG ADON: CPT

## 2019-07-17 PROCEDURE — 6370000000 HC RX 637 (ALT 250 FOR IP): Performed by: INTERNAL MEDICINE

## 2019-07-17 PROCEDURE — 2580000003 HC RX 258: Performed by: INTERNAL MEDICINE

## 2019-07-17 PROCEDURE — 85610 PROTHROMBIN TIME: CPT

## 2019-07-17 PROCEDURE — 85025 COMPLETE CBC W/AUTO DIFF WBC: CPT

## 2019-07-17 PROCEDURE — 94760 N-INVAS EAR/PLS OXIMETRY 1: CPT

## 2019-07-17 RX ORDER — NABUMETONE 750 MG/1
750 TABLET, FILM COATED ORAL 2 TIMES DAILY PRN
COMMUNITY

## 2019-07-17 RX ORDER — WARFARIN SODIUM 5 MG/1
5 TABLET ORAL
Status: DISCONTINUED | OUTPATIENT
Start: 2019-07-17 | End: 2019-07-17 | Stop reason: HOSPADM

## 2019-07-17 RX ORDER — RANITIDINE 150 MG/1
300 TABLET ORAL NIGHTLY
Status: ON HOLD | COMMUNITY
End: 2019-07-17 | Stop reason: HOSPADM

## 2019-07-17 RX ORDER — ALPRAZOLAM 0.25 MG/1
0.75 TABLET ORAL NIGHTLY PRN
COMMUNITY

## 2019-07-17 RX ADMIN — LOSARTAN POTASSIUM 25 MG: 25 TABLET ORAL at 09:17

## 2019-07-17 RX ADMIN — PANTOPRAZOLE SODIUM 40 MG: 40 INJECTION, POWDER, FOR SOLUTION INTRAVENOUS at 09:17

## 2019-07-17 RX ADMIN — Medication 10 ML: at 09:18

## 2019-07-17 RX ADMIN — ACETAMINOPHEN 650 MG: 325 TABLET ORAL at 04:00

## 2019-07-17 RX ADMIN — ATORVASTATIN CALCIUM 20 MG: 20 TABLET, FILM COATED ORAL at 09:17

## 2019-07-17 RX ADMIN — SODIUM CHLORIDE, PRESERVATIVE FREE 10 ML: 5 INJECTION INTRAVENOUS at 09:18

## 2019-07-17 RX ADMIN — CARVEDILOL 12.5 MG: 12.5 TABLET, FILM COATED ORAL at 09:17

## 2019-07-17 RX ADMIN — FUROSEMIDE 40 MG: 40 TABLET ORAL at 09:17

## 2019-07-17 ASSESSMENT — PAIN SCALES - GENERAL
PAINLEVEL_OUTOF10: 0
PAINLEVEL_OUTOF10: 2
PAINLEVEL_OUTOF10: 0

## 2019-07-17 NOTE — DISCHARGE SUMMARY
(COUMADIN) 2.5 MG tablet Take 5 mg by mouth daily Except Thursday take 2.5 mg      docusate sodium (COLACE, DULCOLAX) 100 MG CAPS Take 100 mg by mouth 2 times daily  Qty: 60 capsule, Refills: 0      losartan (COZAAR) 25 MG tablet Take 25 mg by mouth daily      atorvastatin (LIPITOR) 20 MG tablet Take 1 tablet by mouth daily  Qty: 30 tablet, Refills: 3      tamsulosin (FLOMAX) 0.4 MG capsule Take 0.4 mg by mouth daily      levocetirizine (XYZAL) 5 MG tablet Take 5 mg by mouth daily      omeprazole (PRILOSEC) 20 MG delayed release capsule Take 40 mg by mouth daily       carvedilol (COREG) 12.5 MG tablet Take 1 tablet by mouth 2 times daily      ferrous sulfate 325 (65 FE) MG tablet Take 325 mg by mouth daily      furosemide (LASIX) 40 MG tablet Take 1 tablet by mouth 2 times daily              Time Spent on discharge is more than 30 minutes in the examination, evaluation, counseling and review of medications and discharge plan. Signed:    GENIA Barry CNP   7/17/2019      Thank you SADIE THAO for the opportunity to be involved in this patient's care. If you have any questions or concerns please feel free to contact me at [phone].

## 2019-07-17 NOTE — DISCHARGE INSTR - COC
thickness:69047}  Daily Fluid Restriction: {CHP DME Yes amt example:329770342}  Last Modified Barium Swallow with Video (Video Swallowing Test): {Done Not Done UXFR:935061976}    Treatments at the Time of Hospital Discharge:   Respiratory Treatments: ***  Oxygen Therapy:  {Therapy; copd oxygen:01305}  Ventilator:    {MH CC Vent ZCNS:493968316}    Rehab Therapies: {THERAPEUTIC INTERVENTION:0367226115}  Weight Bearing Status/Restrictions: { CC Weight Bearin}  Other Medical Equipment (for information only, NOT a DME order):  {EQUIPMENT:123208728}  Other Treatments: ***    Patient's personal belongings (please select all that are sent with patient):  {CHP DME Belongings:033052139}    RN SIGNATURE:  {Esignature:359342650}    CASE MANAGEMENT/SOCIAL WORK SECTION    Inpatient Status Date: ***    Readmission Risk Assessment Score:  Readmission Risk              Risk of Unplanned Readmission:        13           Discharging to Facility/ Agency   · Name:   · Address:  · Phone:  · Fax:    Dialysis Facility (if applicable)   · Name:  · Address:  · Dialysis Schedule:  · Phone:  · Fax:    / signature: {Esignature:187839065}    PHYSICIAN SECTION    Prognosis: {Prognosis:4806286701}    Condition at Discharge: [facility] Patient Condition:958539876}    Rehab Potential (if transferring to Rehab): {Prognosis:4687610267}    Recommended Labs or Other Treatments After Discharge: ***    Physician Certification: I certify the above information and transfer of [employer]  is necessary for the continuing treatment of the diagnosis listed and that he requires {Admit to Appropriate Level of Care:98786} for {GREATER/LESS:297876492} 30 days.      Update Admission H&P: {CHP DME Changes in QBJZQ:767056799}    PHYSICIAN SIGNATURE:  {Esignature:186109020}

## 2019-07-17 NOTE — CONSULTS
Socioeconomic History    Marital status:      Spouse name: None    Number of children: None    Years of education: None    Highest education level: None   Occupational History    None   Social Needs    Financial resource strain: None    Food insecurity:     Worry: None     Inability: None    Transportation needs:     Medical: None     Non-medical: None   Tobacco Use    Smoking status: Never Smoker    Smokeless tobacco: Never Used   Substance and Sexual Activity    Alcohol use: No    Drug use: Never    Sexual activity: Not Currently     Partners: Female   Lifestyle    Physical activity:     Days per week: None     Minutes per session: None    Stress: None   Relationships    Social connections:     Talks on phone: None     Gets together: None     Attends Druze service: None     Active member of club or organization: None     Attends meetings of clubs or organizations: None     Relationship status: None    Intimate partner violence:     Fear of current or ex partner: None     Emotionally abused: None     Physically abused: None     Forced sexual activity: None   Other Topics Concern    None   Social History Narrative    None       MEDICATIONS   SCHEDULED:    losartan 25 mg Daily   furosemide 40 mg BID   cetirizine 5 mg Daily   carvedilol 12.5 mg BID   atorvastatin 20 mg Daily   sodium chloride flush 10 mL 2 times per day   pantoprazole 40 mg BID   And     sodium chloride (PF) 10 mL BID   warfarin (COUMADIN) daily dosing (placeholder)  RX Placeholder     FLUIDS/DRIPS:     sodium chloride 75 mL/hr at 07/16/19 2211     PRNs:   iopamidol 75 mL ONCE PRN   sodium chloride flush 10 mL PRN   magnesium hydroxide 30 mL Daily PRN   ondansetron 4 mg Q4H PRN   acetaminophen 650 mg Q4H PRN     ALLERGIES:  He   Allergies   Allergen Reactions    Ambien [Zolpidem]      Rash         REVIEW OF SYSTEMS   Pertinent ROS noted in HPI    PHYSICAL EXAM   [unfilled]   I/O last 3 completed shifts:   In: 120 no edema. He is alert and oriented and conversational.  No scleral icterus. 1.  Epigastric pain then improved. Negative workup other than gastritis on CAT scan. Need to rule out malignancy. We'll plan on upper endoscopy but needs to have a normal INR first, we'll arrange for outpatient endoscopy when off Coumadin for 5 days. Okay for discharge from my standpoint once tolerating a diet. Thank you for allowing me to participate in this patient's care. If there are any questions or concerns regarding this patient, or the plan we have set in place, please feel free to contact me at [phone].        Dipesh Hahn MD

## 2019-07-17 NOTE — PLAN OF CARE
Problem: Falls - Risk of:  Goal: Will remain free from falls  Description  Will remain free from falls  7/17/2019 0923 by Jefe Andrews RN  Outcome: Ongoing     Problem: Falls - Risk of:  Goal: Absence of physical injury  Description  Absence of physical injury  Outcome: Ongoing     Problem: Pain:  Goal: Pain level will decrease  Description  Pain level will decrease  7/17/2019 0923 by Jefe Andrews RN  Outcome: Ongoing     Problem: Pain:  Goal: Control of acute pain  Description  Control of acute pain  Outcome: Ongoing

## 2019-07-17 NOTE — CARE COORDINATION
Received notice of dc order for today. Met with pt who is planning to return home with spouse to [address] [place] [address],  who is able to assist as needed. Spouse is on her way to transport pt home. Pt denies any other  Dc needs at this time.     Electronically signed by GEOFFREY Paulino on 7/17/2019 at 2:35 PM

## 2019-08-07 ENCOUNTER — ANESTHESIA EVENT (OUTPATIENT)
Dept: ENDOSCOPY | Age: 84
End: 2019-08-07
Payer: MEDICARE

## 2019-08-08 ENCOUNTER — ANESTHESIA (OUTPATIENT)
Dept: ENDOSCOPY | Age: 84
End: 2019-08-08
Payer: MEDICARE

## 2019-08-08 ENCOUNTER — HOSPITAL ENCOUNTER (OUTPATIENT)
Age: 84
Setting detail: OUTPATIENT SURGERY
Discharge: HOME | End: 2019-08-08
Attending: INTERNAL MEDICINE | Admitting: INTERNAL MEDICINE
Payer: MEDICARE

## 2019-08-08 VITALS
DIASTOLIC BLOOD PRESSURE: 68 MMHG | RESPIRATION RATE: 16 BRPM | OXYGEN SATURATION: 99 % | TEMPERATURE: 97.2 F | BODY MASS INDEX: 27.17 KG/M2 | HEART RATE: 60 BPM | WEIGHT: 159.17 LBS | HEIGHT: 64 IN | SYSTOLIC BLOOD PRESSURE: 151 MMHG

## 2019-08-08 VITALS
OXYGEN SATURATION: 100 % | DIASTOLIC BLOOD PRESSURE: 66 MMHG | SYSTOLIC BLOOD PRESSURE: 120 MMHG | RESPIRATION RATE: 18 BRPM

## 2019-08-08 DIAGNOSIS — K22.89 ESOPHAGEAL THICKENING: ICD-10-CM

## 2019-08-08 LAB
GLUCOSE BLD-MCNC: 88 MG/DL (ref 70–99)
GLUCOSE BLD-MCNC: 96 MG/DL (ref 70–99)
INR BLD: 1.12 (ref 0.86–1.14)
PERFORMED ON: NORMAL
PERFORMED ON: NORMAL
PROTHROMBIN TIME: 12.8 SEC (ref 9.8–13)

## 2019-08-08 PROCEDURE — 2500000003 HC RX 250 WO HCPCS: Performed by: NURSE ANESTHETIST, CERTIFIED REGISTERED

## 2019-08-08 PROCEDURE — 3609012400 HC EGD TRANSORAL BIOPSY SINGLE/MULTIPLE: Performed by: INTERNAL MEDICINE

## 2019-08-08 PROCEDURE — 3609015300 HC ESOPHAGEAL DILATION MALONEY: Performed by: INTERNAL MEDICINE

## 2019-08-08 PROCEDURE — 7100000001 HC PACU RECOVERY - ADDTL 15 MIN: Performed by: INTERNAL MEDICINE

## 2019-08-08 PROCEDURE — 6360000002 HC RX W HCPCS: Performed by: NURSE ANESTHETIST, CERTIFIED REGISTERED

## 2019-08-08 PROCEDURE — 88305 TISSUE EXAM BY PATHOLOGIST: CPT

## 2019-08-08 PROCEDURE — 2580000003 HC RX 258: Performed by: ANESTHESIOLOGY

## 2019-08-08 PROCEDURE — 7100000011 HC PHASE II RECOVERY - ADDTL 15 MIN: Performed by: INTERNAL MEDICINE

## 2019-08-08 PROCEDURE — 36415 COLL VENOUS BLD VENIPUNCTURE: CPT

## 2019-08-08 PROCEDURE — 3700000001 HC ADD 15 MINUTES (ANESTHESIA): Performed by: INTERNAL MEDICINE

## 2019-08-08 PROCEDURE — 85610 PROTHROMBIN TIME: CPT

## 2019-08-08 PROCEDURE — 7100000010 HC PHASE II RECOVERY - FIRST 15 MIN: Performed by: INTERNAL MEDICINE

## 2019-08-08 PROCEDURE — 3700000000 HC ANESTHESIA ATTENDED CARE: Performed by: INTERNAL MEDICINE

## 2019-08-08 PROCEDURE — 2709999900 HC NON-CHARGEABLE SUPPLY: Performed by: INTERNAL MEDICINE

## 2019-08-08 PROCEDURE — 7100000000 HC PACU RECOVERY - FIRST 15 MIN: Performed by: INTERNAL MEDICINE

## 2019-08-08 RX ORDER — SODIUM CHLORIDE 9 MG/ML
INJECTION, SOLUTION INTRAVENOUS CONTINUOUS
Status: DISCONTINUED | OUTPATIENT
Start: 2019-08-08 | End: 2019-08-08 | Stop reason: HOSPADM

## 2019-08-08 RX ORDER — LIDOCAINE HYDROCHLORIDE 20 MG/ML
INJECTION, SOLUTION EPIDURAL; INFILTRATION; INTRACAUDAL; PERINEURAL PRN
Status: DISCONTINUED | OUTPATIENT
Start: 2019-08-08 | End: 2019-08-08 | Stop reason: SDUPTHER

## 2019-08-08 RX ORDER — PROPOFOL 10 MG/ML
INJECTION, EMULSION INTRAVENOUS PRN
Status: DISCONTINUED | OUTPATIENT
Start: 2019-08-08 | End: 2019-08-08 | Stop reason: SDUPTHER

## 2019-08-08 RX ORDER — SODIUM CHLORIDE 9 MG/ML
INJECTION, SOLUTION INTRAVENOUS ONCE
Status: DISCONTINUED | OUTPATIENT
Start: 2019-08-08 | End: 2019-08-08 | Stop reason: SDUPTHER

## 2019-08-08 RX ORDER — SODIUM CHLORIDE 0.9 % (FLUSH) 0.9 %
10 SYRINGE (ML) INJECTION PRN
Status: DISCONTINUED | OUTPATIENT
Start: 2019-08-08 | End: 2019-08-08 | Stop reason: HOSPADM

## 2019-08-08 RX ORDER — PROPOFOL 10 MG/ML
INJECTION, EMULSION INTRAVENOUS CONTINUOUS PRN
Status: DISCONTINUED | OUTPATIENT
Start: 2019-08-08 | End: 2019-08-08 | Stop reason: SDUPTHER

## 2019-08-08 RX ORDER — SODIUM CHLORIDE 0.9 % (FLUSH) 0.9 %
10 SYRINGE (ML) INJECTION EVERY 12 HOURS SCHEDULED
Status: DISCONTINUED | OUTPATIENT
Start: 2019-08-08 | End: 2019-08-08 | Stop reason: HOSPADM

## 2019-08-08 RX ADMIN — PROPOFOL 140 MCG/KG/MIN: 10 INJECTION, EMULSION INTRAVENOUS at 11:21

## 2019-08-08 RX ADMIN — LIDOCAINE HYDROCHLORIDE 80 MG: 20 INJECTION, SOLUTION EPIDURAL; INFILTRATION; INTRACAUDAL; PERINEURAL at 11:19

## 2019-08-08 RX ADMIN — PROPOFOL 60 MG: 10 INJECTION, EMULSION INTRAVENOUS at 11:19

## 2019-08-08 RX ADMIN — SODIUM CHLORIDE: 9 INJECTION, SOLUTION INTRAVENOUS at 10:43

## 2019-08-08 ASSESSMENT — PAIN SCALES - GENERAL
PAINLEVEL_OUTOF10: 0

## 2019-08-08 ASSESSMENT — ENCOUNTER SYMPTOMS: SHORTNESS OF BREATH: 1

## 2019-08-08 ASSESSMENT — PULMONARY FUNCTION TESTS
PIF_VALUE: 0

## 2019-08-08 ASSESSMENT — PAIN - FUNCTIONAL ASSESSMENT: PAIN_FUNCTIONAL_ASSESSMENT: 0-10

## 2019-08-08 ASSESSMENT — LIFESTYLE VARIABLES: SMOKING_STATUS: 0

## 2019-08-08 NOTE — ANESTHESIA PRE PROCEDURE
[facility] Department of Anesthesiology  Pre-Anesthesia Evaluation/Consultation       Name:  Mayra Talbert  : 1935  Age:  81 y.o. MRN:  [id number]  Date: 2019           Surgeon: Surgeon(s):  Yessy Jensen MD    Procedure: Procedure(s):  ESOPHAGOGASTRODUODENOSCOPY     Allergies   Allergen Reactions    Ambien [Zolpidem]      Rash      Labetalol Itching and Rash     Patient Active Problem List   Diagnosis    TIA (transient ischemic attack)    Hip fracture requiring operative repair, left, closed, initial encounter [facility]    Hip pain, acute, left    Essential hypertension    Hyperlipidemia    DMII (diabetes mellitus, type 2) ([facility]    Atrial fibrillation ([facility]    Gastritis without bleeding    Epigastric pain    Chest pain    Elevated troponin    Shortness of breath    Presence of cardiac defibrillator     Past Medical History:   Diagnosis Date    Atrial fibrillation ([facility]     Diabetes mellitus ([facility]     unsure if diarbetic per patient    Hypertension     MI (mitral incompetence)      Past Surgical History:   Procedure Laterality Date    BACK SURGERY      CARDIAC DEFIBRILLATOR PLACEMENT      CORONARY ANGIOPLASTY WITH STENT PLACEMENT      HERNIA REPAIR      HIP PINNING Left 3/25/2019    CLOSED REDUCTION PERCUTANEOUS LEFT HIP PINNING performed by Edwin Reid MD at [address] Left 2019    CLOSED REDUCTION PERCUTANEOUS LEFT HIP PINNING POSSIBLE LEFT HIP HEMIARTHROPLASTY    JOINT REPLACEMENT Bilateral     knees    PACEMAKER INSERTION       Social History     Tobacco Use    Smoking status: Never Smoker    Smokeless tobacco: Never Used   Substance Use Topics    Alcohol use: No    Drug use: Never     Medications  No current facility-administered medications on file prior to encounter.       Current Outpatient Medications on File Prior to Encounter   Medication Sig Dispense Refill    enoxaparin (LOVENOX) 100 MG/ML injection Inject 100 mg into the skin 2 times daily      ALPRAZolam (XANAX) 0.25 MG tablet Take 0.75 mg by mouth nightly as needed for Sleep.  nabumetone (RELAFEN) 750 MG tablet Take 750 mg by mouth 2 times daily      warfarin (COUMADIN) 2.5 MG tablet Take 5 mg by mouth daily Except Thursday take 2.5 mg      docusate sodium (COLACE, DULCOLAX) 100 MG CAPS Take 100 mg by mouth 2 times daily 60 capsule 0    losartan (COZAAR) 25 MG tablet Take 25 mg by mouth daily      atorvastatin (LIPITOR) 20 MG tablet Take 1 tablet by mouth daily 30 tablet 3    tamsulosin (FLOMAX) 0.4 MG capsule Take 0.4 mg by mouth daily      levocetirizine (XYZAL) 5 MG tablet Take 5 mg by mouth daily      omeprazole (PRILOSEC) 20 MG delayed release capsule Take 40 mg by mouth daily       carvedilol (COREG) 12.5 MG tablet Take 1 tablet by mouth 2 times daily      ferrous sulfate 325 (65 FE) MG tablet Take 325 mg by mouth daily      furosemide (LASIX) 40 MG tablet Take 1 tablet by mouth 2 times daily        Current Facility-Administered Medications   Medication Dose Route Frequency Provider Last Rate Last Dose    0.9 % sodium chloride infusion   Intravenous Continuous Reji Valles MD        sodium chloride flush 0.9 % injection 10 mL  10 mL Intravenous 2 times per day Reji Valles MD        sodium chloride flush 0.9 % injection 10 mL  10 mL Intravenous PRN Reji Valles MD         Vital Signs (Current)   There were no vitals filed for this visit. BP Readings from Last 3 Encounters:   07/17/19 (!) 178/88   03/27/19 (!) 151/84   03/25/19 101/63     Vital Signs Statistics (for past 48 hrs)     No data recorded  BP Readings from Last 3 Encounters:   07/17/19 (!) 178/88   03/27/19 (!) 151/84   03/25/19 101/63       BMI  There is no height or weight on file to calculate BMI. Estimated body mass index is 27.02 kg/m² as calculated from the following:    Height as of 7/16/19: 5' 4\" (1.626 m).

## 2019-08-08 NOTE — PROGRESS NOTES
Pt to phase II. Alert and oriented, abdomen soft, VSS, denies pain. Will monitor.
or toes      For your safety, please do not wear any jewelry or body piercing's on the day of surgery. All jewelry must be removed. If you have dentures, they will be removed before going to operating room. For your convenience, we will provide you with a container. If you wear contact lenses or glasses, they will be removed, please bring a case for them. If you have a living will and a durable power of  for healthcare, please bring in a copy. As part of our patient safety program to minimize surgical site infections, we ask you to do the following:    · Please notify your surgeon if you develop any illness between         now and the  day of your surgery. · This includes a cough, cold, fever, sore throat, nausea,         or vomiting, and diarrhea, etc.  ·  Please notify your surgeon if you experience dizziness, shortness         of breath or blurred vision between now and the time of your surgery. Do not shave your operative site 96 hours prior to surgery. For face and neck surgery, men may use an electric razor 48 hours   prior to surgery. You may shower the night before surgery or the morning of   your surgery with an antibacterial soap. You will need to bring a photo ID and insurance card    [facility] has an onsite pharmacy, would you like to utilize our pharmacy     If you will be staying overnight and use a C-pap machine, please bring   your C-pap to hospital     Our goal is to provide you with excellent care, therefore, visitors will be limited to two(2) in the room at a time so that we may focus on providing this care for you. Please contact pre-admission testing if you have any further questions. [facility] phone number:  [phone] [facility] fax number:  [phone]  Please note these are generalized instructions for all surgical cases, you may be provided with more specific instructions according to your surgery.

## 2019-08-15 PROBLEM — R77.8 ELEVATED TROPONIN: Status: RESOLVED | Noted: 2019-07-16 | Resolved: 2019-08-15

## 2019-12-14 ENCOUNTER — HOSPITAL ENCOUNTER (EMERGENCY)
Age: 84
Discharge: HOME | End: 2019-12-14
Payer: MEDICARE

## 2019-12-14 VITALS
RESPIRATION RATE: 18 BRPM | HEART RATE: 88 BPM | TEMPERATURE: 98 F | WEIGHT: 158 LBS | DIASTOLIC BLOOD PRESSURE: 78 MMHG | BODY MASS INDEX: 26.98 KG/M2 | HEIGHT: 64 IN | OXYGEN SATURATION: 98 % | SYSTOLIC BLOOD PRESSURE: 118 MMHG

## 2019-12-14 DIAGNOSIS — T14.8XXA BLEEDING FROM WOUND: Primary | ICD-10-CM

## 2019-12-14 PROCEDURE — 99282 EMERGENCY DEPT VISIT SF MDM: CPT

## 2019-12-14 ASSESSMENT — ENCOUNTER SYMPTOMS
VOMITING: 0
NAUSEA: 0

## 2021-02-15 NOTE — PLAN OF CARE
Ongoing POC
Ongoing POC
Problem: Falls - Risk of:  Goal: Will remain free from falls  Description    Problem: Falls - Risk of:  Goal: Will remain free from falls  Description  Will remain free from falls  Outcome: Ongoing  Note:   Patient remains free from falls during this shift. Will continue to implement fall precautions   Goal: Absence of physical injury  Description  Absence of physical injury  Outcome: Ongoing  Note:   Patient remains free from physical injury at this time      Problem: Pain:  Goal: Pain level will decrease  Description  Pain level will decrease  Outcome: Ongoing  Note:   Patient's pain levels are controlled with pharmacologic and non pharmacologic interventions. Goal: Control of acute pain  Description  Control of acute pain  Outcome: Ongoing  Note:   Patient's pain levels are controlled with pharmacologic and non pharmacologic interventions. Goal: Control of chronic pain  Description  Control of chronic pain  Outcome: Ongoing  Note:   Patient's pain levels are controlled with pharmacologic and non pharmacologic interventions. Problem: Risk for Impaired Skin Integrity  Goal: Tissue integrity - skin and mucous membranes  Description  Structural intactness and normal physiological function of skin and  mucous membranes. Outcome: Ongoing  Note:   Patient's skin integrity and mucus membrane conditions have not changed. Will continue to assess.
Problem: Falls - Risk of:  Goal: Will remain free from falls  Description  Will remain free from falls  3/24/2019 1136 by Stevphen Bernheim, RN  Outcome: Ongoing  Note:   Bed in lowest position, wheels locked, bed/chair exit alarm in place, call light within reach, and non skid footwear on. Walkway free of clutter. Pt alert and oriented and able to make needs known. Pt educated to use call light when needing to get up, and pt utilizes call light to make needs known. Will continue to monitor. Electronically signed by Stevphen Bernheim, RN on 3/24/2019 at 11:37 AM      Problem: Pain:  Goal: Pain level will decrease  Description  Pain level will decrease  3/24/2019 1136 by Stevphen Bernheim, RN  Outcome: Ongoing  Note:   Educated patient on pain management. Will assess patients pain level per unit protocol, and provide pain management measures as needed. Electronically signed by Stevphen Bernheim, RN on 3/24/2019 at 11:37 AM      Problem: Risk for Impaired Skin Integrity  Goal: Tissue integrity - skin and mucous membranes  Description  Structural intactness and normal physiological function of skin and  mucous membranes. 3/24/2019 1136 by Stevphen Bernheim, RN  Outcome: Ongoing  Note:   Will monitor skin and mucous membranes. Will turn patient every 2 hours, monitor for friction and sheering, and change dressings as needed. Will preform skin assessment every shift.    Electronically signed by Stevphen Bernheim, RN on 3/24/2019 at 11:37 AM
Problem: Falls - Risk of:  Goal: Will remain free from falls  Description  Will remain free from falls  3/25/2019 1144 by Pola Diaz RN  Outcome: Ongoing  Note:   Patient remains free from falls during this admission. Will continue to monitor. Problem: Falls - Risk of:  Goal: Absence of physical injury  Description  Absence of physical injury  3/25/2019 1144 by Pola Diaz RN  Outcome: Ongoing  Note:   Patient remains free from physical injury during this shift. Will continue to monitor. Problem: Pain:  Goal: Pain level will decrease  Description  Pain level will decrease  3/25/2019 1144 by Pola Diaz RN  Outcome: Ongoing  Note:   Patient pain levels managed with pharmacologic and non pharmacologic interventions at this time. Will continue to monitor. Problem: Pain:  Goal: Control of acute pain  Description  Control of acute pain  3/25/2019 1144 by Pola Diaz RN  Outcome: Ongoing  Note:   Patient pain levels managed with pharmacologic and non pharmacologic interventions at this time. Will continue to monitor. Problem: Pain:  Goal: Control of chronic pain  Description  Control of chronic pain  3/25/2019 1144 by Pola Diaz RN  Outcome: Ongoing  Note:   Patient pain levels managed with pharmacologic and non pharmacologic interventions at this time. Will continue to monitor. Problem: Risk for Impaired Skin Integrity  Goal: Tissue integrity - skin and mucous membranes  Description  Structural intactness and normal physiological function of skin and  mucous membranes. 3/25/2019 1144 by Pola Diaz RN  Outcome: Ongoing  Note:   Patient does not present with any new skin integrity issues at this time. Will continue to assist patient with turning and skin assessments.
Problem: Falls - Risk of:  Goal: Will remain free from falls  Description  Will remain free from falls  3/25/2019 2308 by Artie Koyanagi, RN  Outcome: Ongoing  Note:   Fall risk assessment completed every shift. All precautions in place. Pt has call light within reach at all times. Room clear of clutter. Pt aware to call for assistance when getting up. Problem: Pain:  Goal: Control of acute pain  Description  Control of acute pain  3/25/2019 2308 by Artie Koyanagi, RN  Outcome: Ongoing  Note:   Pain/discomfort being managed with PRN analgesics per MD orders. Pt able to express presence and absence of pain and rate pain appropriately using numerical scale. Problem: Risk for Impaired Skin Integrity  Goal: Tissue integrity - skin and mucous membranes  Description  Structural intactness and normal physiological function of skin and  mucous membranes. 3/25/2019 2308 by Artie Koyanagi, RN  Outcome: Ongoing  Note:   Skin assessment completed every shift. Pt assessed for incontinence, appropriate barrier cream applied prn. Pt encouraged to turn/rotate every 2 hours. Assistance provided if pt unable to do so themselves.
Problem: Falls - Risk of:  Goal: Will remain free from falls  Description  Will remain free from falls  3/27/2019 0136 by Marilou Lofton RN  Outcome: Ongoing  Note:   Fall risk assessment completed every shift. All precautions in place. Pt has call light within reach at all times. Room clear of clutter. Pt aware to call for assistance when getting up. Problem: Pain:  Goal: Control of acute pain  Description  Control of acute pain  3/27/2019 0136 by Marilou Lofton RN  Outcome: Ongoing  Note:   Pain/discomfort being managed with PRN analgesics per MD orders. Pt able to express presence and absence of pain and rate pain appropriately using numerical scale. Problem: Risk for Impaired Skin Integrity  Goal: Tissue integrity - skin and mucous membranes  Description  Structural intactness and normal physiological function of skin and  mucous membranes. 3/27/2019 0136 by Marilou Lofton RN  Outcome: Ongoing  Note:   Skin assessment completed every shift. Pt assessed for incontinence, appropriate barrier cream applied prn. Pt encouraged to turn/rotate every 2 hours. Assistance provided if pt unable to do so themselves.
pain per unit protocol.
Attending Only

## 2021-08-20 NOTE — PROGRESS NOTES
Preoperative Screening for Elective Surgery/Invasive Procedures While COVID-19 present in the community     Have you tested positive or have been told to self-isolate for COVID-19 like symptoms within the past 28 days? no   Do you currently have any of the following symptoms?no  o Fever >100.0 F or 99.9 F in immunocompromised patients? o New onset cough, shortness of breath or difficulty breathing?  o New onset sore throat, myalgia (muscle aches and pains), headache, loss of taste/smell or diarrhea?  Have you had a potential exposure to COVID-19 within the past 14 days by:  o Close contact with a confirmed case?no  o Close contact with a healthcare worker,  or essential infrastructure worker (grocery store, [employer], gas station, public utilities or transportation)? Home health nurse  o Do you reside in a congregate setting such as; skilled nursing facility, adult home, correctional facility, homeless shelter or other institutional setting? no  o Have you had recent travel to a known COVID-19 hotspot? no    Indicate if the patient has a positive screen by answering yes to one or more of the above questions. Patients who test positive or screen positive prior to surgery or on the day of surgery should be evaluated in conjunction with the surgeon/proceduralist/anesthesiologist to determine the urgency of the procedure.

## 2021-08-20 NOTE — PROGRESS NOTES
C-Difficile admission screening and protocol:     * Admitted with diarrhea? no     *Prior history of C-Diff. In last 3 months? no     *Antibiotic use in the past 6-8 weeks? For dental work a couple months ago     *Prior hospitalization or nursing home in the last month?     no

## 2021-08-20 NOTE — PROGRESS NOTES
Pt was requested to have Rapid Covid test to be done prior to surgery/procedure. Understands that surgery/procedure maybe subjected to cancel if not completed prior to DOS and to bring copy of rapid testing in DOS. If positive, pt must contact surgeon immediately or with any other questions. Pt's son in law advised to find and bring pt's covid vaccination card. If card can't be located a rapid test will need to be done at a [address] [place] [address] Dr or PCP, and the results brought on the day of surgery.

## 2021-08-20 NOTE — PROGRESS NOTES
[address] [facility] time____1130________        Surgery time______1000______    Take the following medications with a sip of water: per md office     Do not eat or drink anything after 12:00 midnight prior to your surgery. This includes water chewing gum, mints and ice chips. You may brush your teeth and gargle the morning of your surgery, but do not swallow the water     Please see your family doctor/pediatrician for a history and physical and/or concerning medications. Bring any test results/reports from your physicians office. If you are under the care of a heart doctor or specialist doctor, please be aware that you may be asked to them for clearance    You may be asked to stop blood thinners such as Coumadin, Plavix, Fragmin, Lovenox, etc., or any anti-inflammatories such as:  Aspirin, Ibuprofen, Advil, Naproxen prior to your surgery. We also ask that you stop any OTC medications such as fish oil, vitamin E, glucosamine, garlic, Multivitamins, COQ 10, etc.    We ask that you do not smoke 24 hours prior to surgery  We ask that you do not  drink any alcoholic beverages 24 hours prior to surgery     You must make arrangements for a responsible adult to take you home after your surgery. For your safety you will not be allowed to leave alone or drive yourself home. Your surgery will be cancelled if you do not have a ride home. Also for your safety, it is strongly suggested that someone stay with you the first 24 hours after your surgery. A parent or legal guardian must accompany a child scheduled for surgery and plan to stay at the hospital until the child is discharged. Please do not bring other children with you. For your comfort, please wear simple loose fitting clothing to the hospital.  Please do not bring valuables.     Do not wear any make-up or nail polish on your fingers or toes      For your safety, please do not wear any jewelry or body piercing's on the day of surgery. All jewelry must be removed. If you have dentures, they will be removed before going to operating room. For your convenience, we will provide you with a container. If you wear contact lenses or glasses, they will be removed, please bring a case for them. If you have a living will and a durable power of  for healthcare, please bring in a copy. As part of our patient safety program to minimize surgical site infections, we ask you to do the following:    · Please notify your surgeon if you develop any illness between         now and the  day of your surgery. · This includes a cough, cold, fever, sore throat, nausea,         or vomiting, and diarrhea, etc.  ·  Please notify your surgeon if you experience dizziness, shortness         of breath or blurred vision between now and the time of your surgery. Do not shave your operative site 96 hours prior to surgery. For face and neck surgery, men may use an electric razor 48 hours   prior to surgery. You may shower the night before surgery or the morning of   your surgery with an antibacterial soap. You will need to bring a photo ID and insurance card    [facility] has an onsite pharmacy, would you like to utilize our pharmacy     If you will be staying overnight and use a C-pap machine, please bring   your C-pap to hospital     Our goal is to provide you with excellent care, therefore, visitors will be limited to two(2) in the room at a time so that we may focus on providing this care for you. Please contact pre-admission testing if you have any further questions. [facility] phone number:  [phone] [facility] fax number:  [phone]  Please note these are generalized instructions for all surgical cases, you may be provided with more specific instructions according to your surgery.

## 2021-08-20 NOTE — PROGRESS NOTES
Phone message left with son-in-law to call [facility] dept at [phone]  for history review and surgery instructions. 8/20/21 7417 JANINA Paredes RN

## 2021-08-24 ENCOUNTER — ANESTHESIA EVENT (OUTPATIENT)
Dept: ENDOSCOPY | Age: 86
End: 2021-08-24
Payer: MEDICARE

## 2021-08-25 ENCOUNTER — ANESTHESIA (OUTPATIENT)
Dept: ENDOSCOPY | Age: 86
End: 2021-08-25
Payer: MEDICARE

## 2021-08-25 ENCOUNTER — HOSPITAL ENCOUNTER (OUTPATIENT)
Age: 86
Setting detail: OUTPATIENT SURGERY
Discharge: HOME | End: 2021-08-25
Attending: INTERNAL MEDICINE | Admitting: INTERNAL MEDICINE
Payer: MEDICARE

## 2021-08-25 VITALS
OXYGEN SATURATION: 99 % | WEIGHT: 145 LBS | RESPIRATION RATE: 16 BRPM | SYSTOLIC BLOOD PRESSURE: 162 MMHG | DIASTOLIC BLOOD PRESSURE: 81 MMHG | HEIGHT: 67 IN | TEMPERATURE: 96.8 F | HEART RATE: 69 BPM | BODY MASS INDEX: 22.76 KG/M2

## 2021-08-25 VITALS — DIASTOLIC BLOOD PRESSURE: 60 MMHG | SYSTOLIC BLOOD PRESSURE: 127 MMHG | OXYGEN SATURATION: 97 %

## 2021-08-25 LAB
GLUCOSE BLD-MCNC: 80 MG/DL (ref 70–99)
GLUCOSE BLD-MCNC: 90 MG/DL (ref 70–99)
PERFORMED ON: NORMAL
PERFORMED ON: NORMAL

## 2021-08-25 PROCEDURE — 3609017100 HC EGD: Performed by: INTERNAL MEDICINE

## 2021-08-25 PROCEDURE — 7100000000 HC PACU RECOVERY - FIRST 15 MIN: Performed by: INTERNAL MEDICINE

## 2021-08-25 PROCEDURE — 7100000001 HC PACU RECOVERY - ADDTL 15 MIN: Performed by: INTERNAL MEDICINE

## 2021-08-25 PROCEDURE — 2500000003 HC RX 250 WO HCPCS: Performed by: NURSE ANESTHETIST, CERTIFIED REGISTERED

## 2021-08-25 PROCEDURE — 2709999900 HC NON-CHARGEABLE SUPPLY: Performed by: INTERNAL MEDICINE

## 2021-08-25 PROCEDURE — 6360000002 HC RX W HCPCS: Performed by: NURSE ANESTHETIST, CERTIFIED REGISTERED

## 2021-08-25 PROCEDURE — 3700000000 HC ANESTHESIA ATTENDED CARE: Performed by: INTERNAL MEDICINE

## 2021-08-25 PROCEDURE — 7100000011 HC PHASE II RECOVERY - ADDTL 15 MIN: Performed by: INTERNAL MEDICINE

## 2021-08-25 PROCEDURE — 7100000010 HC PHASE II RECOVERY - FIRST 15 MIN: Performed by: INTERNAL MEDICINE

## 2021-08-25 RX ORDER — SODIUM CHLORIDE 9 MG/ML
25 INJECTION, SOLUTION INTRAVENOUS PRN
Status: DISCONTINUED | OUTPATIENT
Start: 2021-08-25 | End: 2021-08-25 | Stop reason: HOSPADM

## 2021-08-25 RX ORDER — LIDOCAINE HYDROCHLORIDE 20 MG/ML
INJECTION, SOLUTION EPIDURAL; INFILTRATION; INTRACAUDAL; PERINEURAL PRN
Status: DISCONTINUED | OUTPATIENT
Start: 2021-08-25 | End: 2021-08-25 | Stop reason: SDUPTHER

## 2021-08-25 RX ORDER — PROPOFOL 10 MG/ML
INJECTION, EMULSION INTRAVENOUS PRN
Status: DISCONTINUED | OUTPATIENT
Start: 2021-08-25 | End: 2021-08-25 | Stop reason: SDUPTHER

## 2021-08-25 RX ORDER — SODIUM CHLORIDE 0.9 % (FLUSH) 0.9 %
10 SYRINGE (ML) INJECTION PRN
Status: DISCONTINUED | OUTPATIENT
Start: 2021-08-25 | End: 2021-08-25 | Stop reason: HOSPADM

## 2021-08-25 RX ORDER — SODIUM CHLORIDE 9 MG/ML
INJECTION, SOLUTION INTRAVENOUS CONTINUOUS
Status: DISCONTINUED | OUTPATIENT
Start: 2021-08-25 | End: 2021-08-25 | Stop reason: HOSPADM

## 2021-08-25 RX ORDER — SODIUM CHLORIDE 0.9 % (FLUSH) 0.9 %
10 SYRINGE (ML) INJECTION EVERY 12 HOURS SCHEDULED
Status: DISCONTINUED | OUTPATIENT
Start: 2021-08-25 | End: 2021-08-25 | Stop reason: HOSPADM

## 2021-08-25 RX ADMIN — PROPOFOL 20 MG: 10 INJECTION, EMULSION INTRAVENOUS at 11:24

## 2021-08-25 RX ADMIN — PROPOFOL 80 MG: 10 INJECTION, EMULSION INTRAVENOUS at 11:22

## 2021-08-25 RX ADMIN — PROPOFOL 160 MCG/KG/MIN: 10 INJECTION, EMULSION INTRAVENOUS at 11:26

## 2021-08-25 RX ADMIN — LIDOCAINE HYDROCHLORIDE 50 MG: 20 INJECTION, SOLUTION EPIDURAL; INFILTRATION; INTRACAUDAL; PERINEURAL at 11:22

## 2021-08-25 ASSESSMENT — PULMONARY FUNCTION TESTS
PIF_VALUE: 0
PIF_VALUE: 0
PIF_VALUE: 1
PIF_VALUE: 0

## 2021-08-25 ASSESSMENT — LIFESTYLE VARIABLES: SMOKING_STATUS: 0

## 2021-08-25 ASSESSMENT — PAIN SCALES - GENERAL
PAINLEVEL_OUTOF10: 0
PAINLEVEL_OUTOF10: 0

## 2021-08-25 ASSESSMENT — ENCOUNTER SYMPTOMS: SHORTNESS OF BREATH: 1

## 2021-08-25 ASSESSMENT — PAIN - FUNCTIONAL ASSESSMENT: PAIN_FUNCTIONAL_ASSESSMENT: 0-10

## 2021-08-25 NOTE — H&P
[facility]   Pre-operative History and Physical    Patient: Lorena Suarez  : 1935  Acct#:         HISTORY OF PRESENT ILLNESS:    The patient is a 81 y.o. male  who presents with weight loss, epigastric pain, GERD  Past Medical History:        Diagnosis Date    [facility] fibrillation [facility]     Diabetes mellitus ([facility] [facility]     unsure if diabetic per patient    Hypertension     MI (mitral incompetence)      Past Surgical History:        Procedure Laterality Date    BACK SURGERY      CARDIAC DEFIBRILLATOR PLACEMENT      CORONARY ANGIOPLASTY WITH STENT PLACEMENT      ESOPHAGEAL DILATATION N/A 2019    ESOPHAGEAL DILATION Bharati Distance performed by Neva Beard MD at [address] Left 3/25/2019    CLOSED REDUCTION PERCUTANEOUS LEFT HIP PINNING performed by Delphine Armstrong MD at [address] Left 2019    CLOSED REDUCTION PERCUTANEOUS LEFT HIP PINNING POSSIBLE LEFT HIP HEMIARTHROPLASTY    JOINT REPLACEMENT Bilateral     knees    PACEMAKER INSERTION      UPPER GASTROINTESTINAL ENDOSCOPY N/A 2019    EGD BIOPSY performed by Neva Beard MD at [facility]     Medications prior to admission:   Prior to Admission medications    Medication Sig Start Date End Date Taking? Authorizing Provider   enoxaparin (LOVENOX) 100 MG/ML injection Inject 100 mg into the skin 2 times daily Pt was on lovenox for a week due to having an epidural on 19 pt took last dose on 19    Historical Provider, MD   ALPRAZolam (XANAX) 0.25 MG tablet Take 0.75 mg by mouth nightly as needed for Sleep.     Historical Provider, MD   nabumetone (RELAFEN) 750 MG tablet Take 750 mg by mouth 2 times daily    Historical Provider, MD   warfarin (COUMADIN) 2.5 MG tablet Take 5 mg by mouth daily Except Thursday take 2.5 mg    Historical Provider, MD   docusate sodium (COLACE, DULCOLAX) 100 MG CAPS Take 100 mg by mouth 2 times daily 3/27/19   George Saleem MD   losartan (COZAAR) 25 MG tablet Take 25 mg by mouth daily    Historical Provider, MD   atorvastatin (LIPITOR) 20 MG tablet Take 1 tablet by mouth daily 5/7/17   Adelina Hudson DO   tamsulosin (FLOMAX) 0.4 MG capsule Take 0.4 mg by mouth daily 10/7/12   Historical Provider, MD   levocetirizine (XYZAL) 5 MG tablet Take 5 mg by mouth daily    Historical Provider, MD   omeprazole (PRILOSEC) 20 MG delayed release capsule Take 40 mg by mouth daily     Historical Provider, MD   carvedilol (COREG) 12.5 MG tablet Take 1 tablet by mouth 2 times daily 3/8/17   Historical Provider, MD   ferrous sulfate 325 (65 FE) MG tablet Take 325 mg by mouth daily    Historical Provider, MD   furosemide (LASIX) 40 MG tablet Take 1 tablet by mouth 2 times daily  11/25/16   Historical Provider, MD     Allergies:    Ambien [zolpidem] and Labetalol  Social History:   Social History     Socioeconomic History    Marital status:      Spouse name: Not on file    Number of children: Not on file    Years of education: Not on file    Highest education level: Not on file   Occupational History    Not on file   Tobacco Use    Smoking status: Never Smoker    Smokeless tobacco: Never Used   Vaping Use    Vaping Use: Never used   Substance and Sexual Activity    Alcohol use: No    Drug use: Never    Sexual activity: Not Currently     Partners: Female   Other Topics Concern    Not on file   Social History Narrative    Not on file     Social Determinants of Health     Financial Resource Strain:     Difficulty of Paying Living Expenses:    Food Insecurity:     Worried About Running Out of Food in the Last Year:     [address] in the Last Year:    Transportation Needs:     Lack of Transportation (Medical):      Lack of Transportation (Non-Medical):    Physical Activity:     Days of Exercise per Week:     Minutes of Exercise per Session:    Stress:     Feeling of Stress :    Social Connections:     Frequency of Communication with Friends and Family:     Frequency of Social Gatherings with Friends and Family:     Attends [employer] Services:     Active Member of Clubs or Organizations:     Attends Club or Organization Meetings:     Marital Status:    Intimate Partner Violence:     Fear of Current or Ex-Partner:     Emotionally Abused:     Physically Abused:     Sexually Abused:            Family History:   Family History   Problem Relation Age of Onset    Stroke Mother          PHYSICAL EXAM:      Ht 5' 7\" (1.702 m)   Wt 145 lb (65.8 kg)   BMI 22.71 kg/m²  I        Heart: Normal    Lungs: Normal    Abdomen: Normal      ASA Grade: ASA 3 - Patient with moderate systemic disease with functional limitations    II (soft palate, uvula, fauces visible)  ASSESSMENT AND PLAN:    1. Patient is a 81 y.o. male here for EGD  2. Procedure options, risks and benefits reviewed with patient who expresses understanding.

## 2021-08-25 NOTE — OP NOTE
Operative Note      Patient: Shira Hernandezole  YOB: 1935  MRN: [id number]    Date of Procedure: 8/25/2021    Pre-Op Diagnosis: WEIGHT LOSS, ESOPHAGITIS REFLUX, EPIGASTRIC PAIN    Post-Op Diagnosis: Same       Procedure(s):  ESOPHAGOGASTRODUODENOSCOPY    Surgeon(s):  Russ Mae MD    Assistant:   * No surgical staff found *    Anesthesia: Monitor Anesthesia Care    Estimated Blood Loss (mL): None    Complications: None    Specimens:   * No specimens in log *    Implants:  * No implants in log *      Drains: * No LDAs found *    Findings: See dictated report    Detailed Description of Procedure:   See dictated report    Electronically signed by Russ Mae MD on 8/25/2021 at 11:33 AM

## 2021-08-25 NOTE — ANESTHESIA PRE PROCEDURE
[employer] [facility] Department of Anesthesiology  Pre-Anesthesia Evaluation/Consultation       Name:  Patty Roland  : 1935  Age:  81 y.o.                                            MRN:  [id number]  Date: 2021           Surgeon: Surgeon(s):  Sejal Galeano MD    Procedure: Procedure(s):  ESOPHAGOGASTRODUODENOSCOPY     Allergies   Allergen Reactions    Ambien [Zolpidem]      Rash      Labetalol Itching and Rash     Patient Active Problem List   Diagnosis    TIA (transient ischemic attack)    Hip fracture requiring operative repair, left, closed, initial encounter ([facility]    Hip pain, acute, left    Essential hypertension    Hyperlipidemia    DMII (diabetes mellitus, type 2) ([facility] [facility]    Atrial fibrillation ([facility] [facility]    Gastritis without bleeding    Epigastric pain    Chest pain    Shortness of breath    Presence of cardiac defibrillator     Past Medical History:   Diagnosis Date    Atrial fibrillation ([facility] [address] [facility]     Diabetes mellitus ([facility] [address] [facility]     unsure if diabetic per patient    Hypertension     MI (mitral incompetence)      Past Surgical History:   Procedure Laterality Date    BACK SURGERY      CARDIAC DEFIBRILLATOR PLACEMENT      CORONARY ANGIOPLASTY WITH STENT PLACEMENT      ESOPHAGEAL DILATATION N/A 2019    ESOPHAGEAL DILATION Mitch Buster performed by Roslyn Goldberg, MD at [address] [facility] [address] Left 3/25/2019    CLOSED REDUCTION PERCUTANEOUS LEFT HIP PINNING performed by Mendel Ruddle, MD at [address] Left 2019    CLOSED REDUCTION PERCUTANEOUS LEFT HIP PINNING POSSIBLE LEFT HIP HEMIARTHROPLASTY    JOINT REPLACEMENT Bilateral     knees    PACEMAKER INSERTION      UPPER GASTROINTESTINAL ENDOSCOPY N/A 2019    EGD BIOPSY performed by Roslyn Goldberg, MD at [facility] History     Tobacco Use    Smoking status: Never Smoker    Smokeless tobacco: Never Used   Vaping Use    Vaping Use: Never used   Substance Use Topics    Alcohol use: No    Drug use: Never     Medications  Current Outpatient Medications on File Prior to Visit   Medication Sig Dispense Refill    enoxaparin (LOVENOX) 100 MG/ML injection Inject 100 mg into the skin 2 times daily Pt was on lovenox for a week due to having an epidural on 12/19/19 pt took last dose on 12/13/19      ALPRAZolam (XANAX) 0.25 MG tablet Take 0.75 mg by mouth nightly as needed for Sleep.  nabumetone (RELAFEN) 750 MG tablet Take 750 mg by mouth 2 times daily      warfarin (COUMADIN) 2.5 MG tablet Take 5 mg by mouth daily Except Thursday take 2.5 mg      docusate sodium (COLACE, DULCOLAX) 100 MG CAPS Take 100 mg by mouth 2 times daily 60 capsule 0    losartan (COZAAR) 25 MG tablet Take 25 mg by mouth daily      atorvastatin (LIPITOR) 20 MG tablet Take 1 tablet by mouth daily 30 tablet 3    tamsulosin (FLOMAX) 0.4 MG capsule Take 0.4 mg by mouth daily      levocetirizine (XYZAL) 5 MG tablet Take 5 mg by mouth daily      omeprazole (PRILOSEC) 20 MG delayed release capsule Take 40 mg by mouth daily       carvedilol (COREG) 12.5 MG tablet Take 1 tablet by mouth 2 times daily      ferrous sulfate 325 (65 FE) MG tablet Take 325 mg by mouth daily      furosemide (LASIX) 40 MG tablet Take 1 tablet by mouth 2 times daily        No current facility-administered medications on file prior to visit. No current outpatient medications on file. No current facility-administered medications for this visit. Vital Signs (Current)   There were no vitals filed for this visit. BP Readings from Last 3 Encounters:   12/14/19 118/78   08/08/19 120/66   08/08/19 (!) 151/68     Vital Signs Statistics (for past 48 hrs)     No data recorded  BP Readings from Last 3 Encounters:   12/14/19 118/78   08/08/19 120/66   08/08/19 (!) 151/68       BMI  There is no height or weight on file to calculate BMI.   Estimated body mass index is 22.71 kg/m² as calculated from the following:    Height as of 8/20/21: 5' 7\" (1.702 m). Weight as of 8/20/21: 145 lb (65.8 kg). CBC   Lab Results   Component Value Date    WBC 4.5 07/17/2019    RBC 4.06 07/17/2019    HGB 12.7 07/17/2019    HCT 36.9 07/17/2019    MCV 90.8 07/17/2019    RDW 16.1 07/17/2019     07/17/2019     CMP    Lab Results   Component Value Date     07/17/2019    K 3.8 07/17/2019    CL 95 07/17/2019    CO2 25 07/17/2019    BUN 28 07/17/2019    CREATININE 1.4 07/17/2019    GFRAA 59 07/17/2019    GFRAA >60 12/07/2011    AGRATIO 1.9 07/16/2019    LABGLOM 48 07/17/2019    GLUCOSE 104 07/17/2019    PROT 6.6 07/16/2019    PROT 6.7 12/06/2011    CALCIUM 9.2 07/17/2019    BILITOT 0.6 07/16/2019    ALKPHOS 105 07/16/2019    AST 23 07/16/2019    ALT 15 07/16/2019     BMP    Lab Results   Component Value Date     07/17/2019    K 3.8 07/17/2019    CL 95 07/17/2019    CO2 25 07/17/2019    BUN 28 07/17/2019    CREATININE 1.4 07/17/2019    CALCIUM 9.2 07/17/2019    GFRAA 59 07/17/2019    GFRAA >60 12/07/2011    LABGLOM 48 07/17/2019    GLUCOSE 104 07/17/2019     POCGlucose  No results for input(s): GLUCOSE in the last 72 hours.    Coags    Lab Results   Component Value Date    PROTIME 12.8 08/08/2019    INR 1.12 00/00/3992     HCG (If Applicable) No results found for: PREGTESTUR, PREGSERUM, HCG, HCGQUANT   ABGs No results found for: PHART, PO2ART, MMQ7HDT, CHN5ZWH, BEART, N3IOUZUY   Type & Screen (If Applicable)  No results found for: LABABO, LABRH                         BMI: Wt Readings from Last 3 Encounters:       NPO Status:  >8h                          Anesthesia Evaluation  Patient summary reviewed no history of anesthetic complications:   Airway: Mallampati: II  TM distance: >3 FB     Mouth opening: > = 3 FB Dental:          Pulmonary: breath sounds clear to auscultation  (+) shortness of breath: chronic,      (-) COPD, asthma and not a current smoker                           Cardiovascular:  Exercise tolerance: good (>4 METS),   (+) hypertension:,     (-) valvular problems/murmurs, CAD, dysrhythmias and no hyperlipidemia      Rhythm: regular  Rate: normal                    Neuro/Psych:   (+) TIA,    (-) neuromuscular disease, CVA and depression/anxiety            GI/Hepatic/Renal:        (-) GERD       Endo/Other:    (+) DiabetesType II DM, , .                 Abdominal:             Vascular:     - DVT and PE. Other Findings:               Anesthesia Plan      MAC     ASA 3       Induction: intravenous. Anesthetic plan and risks discussed with patient. Plan discussed with CRNA. This pre-anesthesia assessment may be used as a history and physical.    DOS STAFF ADDENDUM:    Pt seen and examined, chart reviewed (including anesthesia, drug and allergy history). No interval changes to history and physical examination. Anesthetic plan, risks, benefits, alternatives, and personnel involved discussed with patient. Patient verbalized an understanding and agrees to proceed.       Josh Hartley MD  August 25, 2021  10:28 AM

## 2021-08-25 NOTE — OP NOTE
Operative Note      Patient: Jourdan De Souza  YOB: 1935  MRN: [id number]    Date of Procedure: 8/25/2021    Pre-Op Diagnosis: WEIGHT LOSS, ESOPHAGITIS REFLUX, EPIGASTRIC PAIN    Post-Op Diagnosis: Same       Procedure(s):  ESOPHAGOGASTRODUODENOSCOPY    Surgeon(s):  Juan Chavarria MD    Assistant:   * No surgical staff found *    Anesthesia: Monitor Anesthesia Care    Estimated Blood Loss (mL): None    Complications: None    Specimens:   * No specimens in log *    Implants:  * No implants in log *      Drains: * No LDAs found *    Findings: See dictated report    Detailed Description of Procedure:   See dictated report    Electronically signed by Juan Chavarria MD on 8/25/2021 at 11:32 AM

## 2021-08-25 NOTE — BRIEF OP NOTE
Brief Postoperative Note    Dayron Dean  YOB: 1935  [id number]    Pre-operative Diagnosis: Weight loss, epigastric pain, GERD    Post-operative Diagnosis: Same    Procedure: EGD    Anesthesia: MAC    Surgeons/Assistants: Rosetta Cartwright MD    Estimated Blood Loss: None    Complications: None    Specimens: Was Not Obtained    Findings: See dictated report    Electronically signed by Rosetta Cartwright MD on 8/25/2021 at 11:31 AM

## 2021-08-25 NOTE — PROGRESS NOTES
Medications administered and monitored by CRNA, see anesthesia record.     Electronically signed by Kevyn Lock RN on 8/25/2021 at 11:25 AM

## 2021-08-25 NOTE — ANESTHESIA POSTPROCEDURE EVALUATION
Department of Anesthesiology  Postprocedure Note    Patient: Mayela Dang  MRN: [id number]  YOB: 1935  Date of evaluation: 8/25/2021  Time:  11:35 AM     Procedure Summary     Date: 08/25/21 Room / Location: [address] [place] [address]    Anesthesia Start: 1119 Anesthesia Stop: 5089    Procedure: ESOPHAGOGASTRODUODENOSCOPY (N/A ) Diagnosis:       Weight loss      Gastroesophageal reflux disease with esophagitis, unspecified whether hemorrhage      Epigastric pain      (WEIGHT LOSS, ESOPHAGITIS REFLUX, EPIGASTRIC PAIN)    Surgeons: You Blancas MD Responsible Provider: Darren Carpio MD    Anesthesia Type: MAC ASA Status: 3          Anesthesia Type: MAC    Thalia Phase I: Thalia Score: 10    Thalia Phase II:      Last vitals: Reviewed and per EMR flowsheets.        Anesthesia Post Evaluation    Patient location during evaluation: PACU  Patient participation: complete - patient participated  Level of consciousness: awake and alert  Pain score: 0  Airway patency: patent  Nausea & Vomiting: no nausea and no vomiting  Complications: no  Cardiovascular status: blood pressure returned to baseline  Respiratory status: acceptable  Hydration status: euvolemic

## 2022-02-21 ENCOUNTER — APPOINTMENT (OUTPATIENT)
Dept: CT IMAGING | Age: 87
DRG: 065 | End: 2022-02-21
Payer: MEDICARE

## 2022-02-21 ENCOUNTER — APPOINTMENT (OUTPATIENT)
Dept: GENERAL RADIOLOGY | Age: 87
DRG: 065 | End: 2022-02-21
Payer: MEDICARE

## 2022-02-21 ENCOUNTER — HOSPITAL ENCOUNTER (INPATIENT)
Age: 87
LOS: 5 days | Discharge: NURSING, REHAB OR LONG-TERM CARE | DRG: 065 | End: 2022-02-26
Attending: STUDENT IN AN ORGANIZED HEALTH CARE EDUCATION/TRAINING PROGRAM | Admitting: INTERNAL MEDICINE
Payer: MEDICARE

## 2022-02-21 DIAGNOSIS — G45.9 TIA (TRANSIENT ISCHEMIC ATTACK): Primary | ICD-10-CM

## 2022-02-21 PROBLEM — R41.82 AMS (ALTERED MENTAL STATUS): Status: ACTIVE | Noted: 2022-02-21

## 2022-02-21 PROBLEM — I63.9 ACUTE CEREBROVASCULAR ACCIDENT (CVA) (HCC): Status: ACTIVE | Noted: 2022-02-21

## 2022-02-21 LAB
A/G RATIO: 2.1 (ref 1.1–2.2)
ALBUMIN SERPL-MCNC: 4 G/DL (ref 3.4–5)
ALP BLD-CCNC: 66 U/L (ref 40–129)
ALT SERPL-CCNC: 17 U/L (ref 10–40)
AMMONIA: <10 UMOL/L (ref 16–60)
ANION GAP SERPL CALCULATED.3IONS-SCNC: 17 MMOL/L (ref 3–16)
APTT: 36 SEC (ref 26.2–38.6)
AST SERPL-CCNC: 24 U/L (ref 15–37)
BASOPHILS ABSOLUTE: 0 K/UL (ref 0–0.2)
BASOPHILS RELATIVE PERCENT: 1 %
BILIRUB SERPL-MCNC: 0.6 MG/DL (ref 0–1)
BILIRUBIN URINE: NEGATIVE
BLOOD, URINE: NEGATIVE
BUN BLDV-MCNC: 15 MG/DL (ref 7–20)
CALCIUM SERPL-MCNC: 8.6 MG/DL (ref 8.3–10.6)
CHLORIDE BLD-SCNC: 97 MMOL/L (ref 99–110)
CHP ED QC CHECK: YES
CLARITY: CLEAR
CO2: 18 MMOL/L (ref 21–32)
COLOR: YELLOW
CREAT SERPL-MCNC: 0.9 MG/DL (ref 0.8–1.3)
EOSINOPHILS ABSOLUTE: 0.1 K/UL (ref 0–0.6)
EOSINOPHILS RELATIVE PERCENT: 3.3 %
EPITHELIAL CELLS, UA: 3 /HPF (ref 0–5)
GFR AFRICAN AMERICAN: >60
GFR NON-AFRICAN AMERICAN: >60
GLUCOSE BLD-MCNC: 141 MG/DL
GLUCOSE BLD-MCNC: 141 MG/DL (ref 70–99)
GLUCOSE BLD-MCNC: 148 MG/DL (ref 70–99)
GLUCOSE BLD-MCNC: 188 MG/DL (ref 70–99)
GLUCOSE URINE: NEGATIVE MG/DL
HCT VFR BLD CALC: 34.7 % (ref 40.5–52.5)
HEMOGLOBIN: 11.8 G/DL (ref 13.5–17.5)
HYALINE CASTS: 2 /LPF (ref 0–8)
INR BLD: 1.51 (ref 0.88–1.12)
KETONES, URINE: NEGATIVE MG/DL
LACTIC ACID: 1.2 MMOL/L (ref 0.4–2)
LEUKOCYTE ESTERASE, URINE: NEGATIVE
LYMPHOCYTES ABSOLUTE: 0.7 K/UL (ref 1–5.1)
LYMPHOCYTES RELATIVE PERCENT: 20 %
MCH RBC QN AUTO: 30.7 PG (ref 26–34)
MCHC RBC AUTO-ENTMCNC: 33.9 G/DL (ref 31–36)
MCV RBC AUTO: 90.5 FL (ref 80–100)
MICROSCOPIC EXAMINATION: YES
MONOCYTES ABSOLUTE: 0.6 K/UL (ref 0–1.3)
MONOCYTES RELATIVE PERCENT: 16.9 %
NEUTROPHILS ABSOLUTE: 2.2 K/UL (ref 1.7–7.7)
NEUTROPHILS RELATIVE PERCENT: 58.8 %
NITRITE, URINE: NEGATIVE
PDW BLD-RTO: 15.4 % (ref 12.4–15.4)
PERFORMED ON: ABNORMAL
PERFORMED ON: ABNORMAL
PH UA: 7 (ref 5–8)
PLATELET # BLD: 115 K/UL (ref 135–450)
PMV BLD AUTO: 8.6 FL (ref 5–10.5)
POTASSIUM SERPL-SCNC: 4.2 MMOL/L (ref 3.5–5.1)
PRO-BNP: 5043 PG/ML (ref 0–449)
PROCALCITONIN: 0.05 NG/ML (ref 0–0.15)
PROTEIN UA: 30 MG/DL
PROTHROMBIN TIME: 17.3 SEC (ref 9.9–12.7)
RBC # BLD: 3.84 M/UL (ref 4.2–5.9)
RBC UA: 1 /HPF (ref 0–4)
SODIUM BLD-SCNC: 132 MMOL/L (ref 136–145)
SPECIFIC GRAVITY UA: 1.01 (ref 1–1.03)
TOTAL PROTEIN: 5.9 G/DL (ref 6.4–8.2)
TROPONIN: 0.05 NG/ML
TROPONIN: 0.05 NG/ML
URINE REFLEX TO CULTURE: ABNORMAL
URINE TYPE: ABNORMAL
UROBILINOGEN, URINE: 0.2 E.U./DL
WBC # BLD: 3.7 K/UL (ref 4–11)
WBC UA: 1 /HPF (ref 0–5)

## 2022-02-21 PROCEDURE — 82140 ASSAY OF AMMONIA: CPT

## 2022-02-21 PROCEDURE — 99283 EMERGENCY DEPT VISIT LOW MDM: CPT

## 2022-02-21 PROCEDURE — 6370000000 HC RX 637 (ALT 250 FOR IP): Performed by: STUDENT IN AN ORGANIZED HEALTH CARE EDUCATION/TRAINING PROGRAM

## 2022-02-21 PROCEDURE — 70498 CT ANGIOGRAPHY NECK: CPT

## 2022-02-21 PROCEDURE — 93005 ELECTROCARDIOGRAM TRACING: CPT | Performed by: NURSE PRACTITIONER

## 2022-02-21 PROCEDURE — 71045 X-RAY EXAM CHEST 1 VIEW: CPT

## 2022-02-21 PROCEDURE — 85730 THROMBOPLASTIN TIME PARTIAL: CPT

## 2022-02-21 PROCEDURE — 84484 ASSAY OF TROPONIN QUANT: CPT

## 2022-02-21 PROCEDURE — 2060000000 HC ICU INTERMEDIATE R&B

## 2022-02-21 PROCEDURE — 80053 COMPREHEN METABOLIC PANEL: CPT

## 2022-02-21 PROCEDURE — 85610 PROTHROMBIN TIME: CPT

## 2022-02-21 PROCEDURE — 70450 CT HEAD/BRAIN W/O DYE: CPT

## 2022-02-21 PROCEDURE — 83605 ASSAY OF LACTIC ACID: CPT

## 2022-02-21 PROCEDURE — 85025 COMPLETE CBC W/AUTO DIFF WBC: CPT

## 2022-02-21 PROCEDURE — 36415 COLL VENOUS BLD VENIPUNCTURE: CPT

## 2022-02-21 PROCEDURE — 83880 ASSAY OF NATRIURETIC PEPTIDE: CPT

## 2022-02-21 PROCEDURE — 81001 URINALYSIS AUTO W/SCOPE: CPT

## 2022-02-21 PROCEDURE — 6370000000 HC RX 637 (ALT 250 FOR IP): Performed by: NURSE PRACTITIONER

## 2022-02-21 PROCEDURE — 6360000004 HC RX CONTRAST MEDICATION: Performed by: NURSE PRACTITIONER

## 2022-02-21 PROCEDURE — 84145 PROCALCITONIN (PCT): CPT

## 2022-02-21 RX ORDER — NICOTINE POLACRILEX 4 MG
15 LOZENGE BUCCAL PRN
Status: DISCONTINUED | OUTPATIENT
Start: 2022-02-21 | End: 2022-02-26 | Stop reason: HOSPADM

## 2022-02-21 RX ORDER — SODIUM CHLORIDE 0.9 % (FLUSH) 0.9 %
10 SYRINGE (ML) INJECTION PRN
Status: DISCONTINUED | OUTPATIENT
Start: 2022-02-21 | End: 2022-02-26 | Stop reason: HOSPADM

## 2022-02-21 RX ORDER — ONDANSETRON 2 MG/ML
4 INJECTION INTRAMUSCULAR; INTRAVENOUS EVERY 6 HOURS PRN
Status: DISCONTINUED | OUTPATIENT
Start: 2022-02-21 | End: 2022-02-26 | Stop reason: HOSPADM

## 2022-02-21 RX ORDER — LOSARTAN POTASSIUM 25 MG/1
25 TABLET ORAL DAILY
Status: DISCONTINUED | OUTPATIENT
Start: 2022-02-22 | End: 2022-02-26 | Stop reason: HOSPADM

## 2022-02-21 RX ORDER — OXYCODONE HYDROCHLORIDE AND ACETAMINOPHEN 5; 325 MG/1; MG/1
1 TABLET ORAL 3 TIMES DAILY PRN
COMMUNITY

## 2022-02-21 RX ORDER — DEXTROSE MONOHYDRATE 50 MG/ML
100 INJECTION, SOLUTION INTRAVENOUS PRN
Status: DISCONTINUED | OUTPATIENT
Start: 2022-02-21 | End: 2022-02-26 | Stop reason: HOSPADM

## 2022-02-21 RX ORDER — DONEPEZIL HYDROCHLORIDE 10 MG/1
10 TABLET, FILM COATED ORAL NIGHTLY
Status: DISCONTINUED | OUTPATIENT
Start: 2022-02-21 | End: 2022-02-26 | Stop reason: HOSPADM

## 2022-02-21 RX ORDER — 0.9 % SODIUM CHLORIDE 0.9 %
1000 INTRAVENOUS SOLUTION INTRAVENOUS ONCE
Status: DISCONTINUED | OUTPATIENT
Start: 2022-02-21 | End: 2022-02-21

## 2022-02-21 RX ORDER — OXYCODONE HYDROCHLORIDE AND ACETAMINOPHEN 5; 325 MG/1; MG/1
1 TABLET ORAL 3 TIMES DAILY PRN
Status: DISCONTINUED | OUTPATIENT
Start: 2022-02-21 | End: 2022-02-26 | Stop reason: HOSPADM

## 2022-02-21 RX ORDER — DOCUSATE SODIUM 100 MG/1
100 CAPSULE, LIQUID FILLED ORAL 2 TIMES DAILY
Status: DISCONTINUED | OUTPATIENT
Start: 2022-02-21 | End: 2022-02-21

## 2022-02-21 RX ORDER — DEXTROSE MONOHYDRATE 25 G/50ML
12.5 INJECTION, SOLUTION INTRAVENOUS PRN
Status: DISCONTINUED | OUTPATIENT
Start: 2022-02-21 | End: 2022-02-26 | Stop reason: HOSPADM

## 2022-02-21 RX ORDER — PANTOPRAZOLE SODIUM 40 MG/1
40 TABLET, DELAYED RELEASE ORAL
Status: DISCONTINUED | OUTPATIENT
Start: 2022-02-22 | End: 2022-02-26 | Stop reason: HOSPADM

## 2022-02-21 RX ORDER — HYDRALAZINE HYDROCHLORIDE 20 MG/ML
10 INJECTION INTRAMUSCULAR; INTRAVENOUS EVERY 6 HOURS PRN
Status: DISCONTINUED | OUTPATIENT
Start: 2022-02-21 | End: 2022-02-26 | Stop reason: HOSPADM

## 2022-02-21 RX ORDER — TAMSULOSIN HYDROCHLORIDE 0.4 MG/1
0.4 CAPSULE ORAL DAILY
Status: DISCONTINUED | OUTPATIENT
Start: 2022-02-22 | End: 2022-02-26 | Stop reason: HOSPADM

## 2022-02-21 RX ORDER — FUROSEMIDE 40 MG/1
40 TABLET ORAL 2 TIMES DAILY
Status: DISCONTINUED | OUTPATIENT
Start: 2022-02-21 | End: 2022-02-26 | Stop reason: HOSPADM

## 2022-02-21 RX ORDER — SODIUM CHLORIDE 9 MG/ML
25 INJECTION, SOLUTION INTRAVENOUS PRN
Status: DISCONTINUED | OUTPATIENT
Start: 2022-02-21 | End: 2022-02-26 | Stop reason: HOSPADM

## 2022-02-21 RX ORDER — ASPIRIN 81 MG/1
81 TABLET ORAL DAILY
Status: DISCONTINUED | OUTPATIENT
Start: 2022-02-22 | End: 2022-02-22

## 2022-02-21 RX ORDER — ASPIRIN 300 MG/1
300 SUPPOSITORY RECTAL DAILY
Status: DISCONTINUED | OUTPATIENT
Start: 2022-02-22 | End: 2022-02-22

## 2022-02-21 RX ORDER — SODIUM CHLORIDE 0.9 % (FLUSH) 0.9 %
10 SYRINGE (ML) INJECTION EVERY 12 HOURS SCHEDULED
Status: DISCONTINUED | OUTPATIENT
Start: 2022-02-21 | End: 2022-02-26 | Stop reason: HOSPADM

## 2022-02-21 RX ORDER — GLIMEPIRIDE 2 MG/1
2 TABLET ORAL
COMMUNITY
End: 2022-02-21

## 2022-02-21 RX ORDER — ASPIRIN 81 MG/1
81 TABLET, CHEWABLE ORAL ONCE
Status: COMPLETED | OUTPATIENT
Start: 2022-02-21 | End: 2022-02-21

## 2022-02-21 RX ORDER — CETIRIZINE HYDROCHLORIDE 10 MG/1
10 TABLET ORAL DAILY
Status: DISCONTINUED | OUTPATIENT
Start: 2022-02-22 | End: 2022-02-21

## 2022-02-21 RX ORDER — ATORVASTATIN CALCIUM 20 MG/1
20 TABLET, FILM COATED ORAL DAILY
Status: DISCONTINUED | OUTPATIENT
Start: 2022-02-22 | End: 2022-02-21 | Stop reason: DRUGHIGH

## 2022-02-21 RX ORDER — CARVEDILOL 12.5 MG/1
12.5 TABLET ORAL 2 TIMES DAILY
Status: DISCONTINUED | OUTPATIENT
Start: 2022-02-21 | End: 2022-02-25

## 2022-02-21 RX ORDER — WARFARIN SODIUM 5 MG/1
5 TABLET ORAL
Status: DISCONTINUED | OUTPATIENT
Start: 2022-02-21 | End: 2022-02-21

## 2022-02-21 RX ORDER — FERROUS SULFATE TAB EC 324 MG (65 MG FE EQUIVALENT) 324 (65 FE) MG
325 TABLET DELAYED RESPONSE ORAL DAILY
Status: DISCONTINUED | OUTPATIENT
Start: 2022-02-22 | End: 2022-02-26 | Stop reason: HOSPADM

## 2022-02-21 RX ORDER — DONEPEZIL HYDROCHLORIDE 10 MG/1
10 TABLET, FILM COATED ORAL NIGHTLY
COMMUNITY

## 2022-02-21 RX ORDER — ONDANSETRON 4 MG/1
4 TABLET, ORALLY DISINTEGRATING ORAL EVERY 8 HOURS PRN
Status: DISCONTINUED | OUTPATIENT
Start: 2022-02-21 | End: 2022-02-21

## 2022-02-21 RX ORDER — ATORVASTATIN CALCIUM 40 MG/1
40 TABLET, FILM COATED ORAL NIGHTLY
Status: DISCONTINUED | OUTPATIENT
Start: 2022-02-21 | End: 2022-02-26 | Stop reason: HOSPADM

## 2022-02-21 RX ADMIN — IOPAMIDOL 75 ML: 755 INJECTION, SOLUTION INTRAVENOUS at 15:03

## 2022-02-21 RX ADMIN — INSULIN LISPRO 1 UNITS: 100 INJECTION, SOLUTION INTRAVENOUS; SUBCUTANEOUS at 22:00

## 2022-02-21 RX ADMIN — ASPIRIN 81 MG 81 MG: 81 TABLET ORAL at 18:42

## 2022-02-21 NOTE — ED NOTES
Pt's son in law at bedside states he was sitting down talking and eating pizza today around 1pm and pt suddenly couldn't speak, wasn't acting himself. At baseline, pt is A&Ox4, no slurred speech. Squad administered Narcan because pt does take Percocet for chronic back pain. Per son in law, miriamerasmo said pt responded slightly after getting Narcan. Squad's BP was 90/60 before giving Narcan.      Barba Schwab, RN  02/21/22 0068

## 2022-02-21 NOTE — ED PROVIDER NOTES
MidLevel Attestation   I havepersonally performed and/or participated in the history, exam and medical decision making and agree with all pertinent clinical information. I have also reviewed and agree with the past medical, family and social historyunless otherwise noted. I have personally performed a face to face diagnostic evaluation onthis patient. I have reviewed the mid-levels findings and agree. In brief, Miladis Mckoy is a 81 y.o. male that presented to the emergency department with   Chief Complaint   Patient presents with    Altered Mental Status     family called squad for new confusion and generalized weakness concerned about stroke. unknown LKW. mobile stroke unit cleared pt for stroke. B. SBP: 150. Shaka Pickles CONSTITUTIONAL: Well appearing, in no acute distress   EYES: PERRL, No injection, discharge or scleral icterus. NECK: Normal ROM, NO LAD and Moist mucous membranes. CARDIOVASCULAR: Regular rate and rhythm. No murmurs or gallop. PULMONARY/CHEST: Airway patent. No retractions. Breath sounds clear with good air entry bilaterally. ABDOMEN: Soft, Non-distended and non-tender, without guarding or rebound. SKIN: Acyanotic, warm, dry   MUSCULOSKELETAL: No swelling, tenderness or deformity   NEUROLOGICAL: Awake. Pulses intact. Grossly nonfocal                                                                                                                     NIH Stroke Scale     Time: 3:08 PM   Person Administering Scale: Nsehniirobb Eckert MD     Level of consciousness: [0]   0 = Alert;   1 = Not alert;   2 = Not alert;   3 = Responds only with reflex motor or autonomic effects or totally unresponsive, flaccid, and flexic. LOC questions: [0]   0 = Answers both questions correctly. 1 = Answers one question correctly. 2 = Answers neither question correctly. LOC commands: [0]   0 = Performs both tasks correctly. 1 = Performs one task correctly.    2 = Performs neither task correctly. Best Gaze: [ 0 ]   0 = Normal   1 = Partial gaze palsy   2 = Forced deviation     Visual: [0]   0 = No visual loss   1 = Partial hemianopia   2 = Complete hemianopia   3 = Bilateral hemianopia     Facial Palsy: [0]   0 = Normal   1 = Minor paralysis   2 = Partial paralysis   3 = Complete paralysis     Motor left arm: [0]   0 = No drift;   1 = Drift   2 = Some effort against gravity;   3 = No effort against gravity;   4 = No movement. UN = Amputation     Motor right arm: [0]   0 = No drift   1 = Drift   2 = Some effort against gravity   3 = No effort against gravity   4 = No movement   UN = Amputation     Motor left leg: [0]   0 = No drift   1.= Drift   2 = Some effort against gravity   3 = No effort against gravity   4 = No movement. UN = Amputation     Motor right leg: [0]   0 = No drift   1 = Drift   2 = Some effort against gravity   3 = No effort against gravity   4 = No movement   UN = Amputation     Limb Ataxia: [0]   0 = Absent. 1 = Present in one limb. 2 = Present in two limbs   UN = Amputation     Sensory: [0]   0 = Absent   1.= Present in one limb   2 = Present in two limbs   UN = Amputation     Best Language: [0]   0 = No aphasia   1 = Mild-to-moderate aphasia   2 = Severe aphasia   3 = Mute, global aphasia     Dysarthria: [0]   1 = Mild-to-moderate dysarthria   2 = Severe dysarthria   UN = Intubated     Extinction and Inattention: [0]   0 = No abnormality. 1 = Visual, tactile, auditory, spatial, or personal inattention   2 = Profound jagjit-inattention or extinction to more than one modality     TOTAL: Danielle.Aggarwal ]       81year-old gentleman history of atrial fibrillation on warfarin, diabetes, hypertension, myocardial infarction who presents brought by EMS with sudden onset mental status change. Patient was unable to provide information but information was obtained from the son who stated that they were eating lunch around 1 when patient became aphasic.   He was brought to the emergency room.  Stroke protocol was activated. During the evaluation initially patient was aphasic and muted. CT of the head and CTA of head and neck were obtained and unremarkable for any bleeding infarct or large vessel occlusion. While in the emergency room his symptoms resolved and he is back to his baseline or is improving. Stroke team did not think the patient needed TPA. Labs obtained so far with no signs of systemic infection. I believe his symptoms are secondary to TIA. Given this findings and presentation I am recommending that patient be admitted for further evaluation and treatment. Hospitalist been consulted pending admission. EKG by my preliminary interpretation shows sinus rhythm with rate of 71, normal axis, normal intervals, with no ST changes indicative of ischemia at this time.       I have reviewed and interpreted all of the currently available lab results and diagnostics from this visit:  Results for orders placed or performed during the hospital encounter of 02/21/22   CBC with Auto Differential   Result Value Ref Range    WBC 3.7 (L) 4.0 - 11.0 K/uL    RBC 3.84 (L) 4.20 - 5.90 M/uL    Hemoglobin 11.8 (L) 13.5 - 17.5 g/dL    Hematocrit 34.7 (L) 40.5 - 52.5 %    MCV 90.5 80.0 - 100.0 fL    MCH 30.7 26.0 - 34.0 pg    MCHC 33.9 31.0 - 36.0 g/dL    RDW 15.4 12.4 - 15.4 %    Platelets 354 (L) 603 - 450 K/uL    MPV 8.6 5.0 - 10.5 fL    Neutrophils % 58.8 %    Lymphocytes % 20.0 %    Monocytes % 16.9 %    Eosinophils % 3.3 %    Basophils % 1.0 %    Neutrophils Absolute 2.2 1.7 - 7.7 K/uL    Lymphocytes Absolute 0.7 (L) 1.0 - 5.1 K/uL    Monocytes Absolute 0.6 0.0 - 1.3 K/uL    Eosinophils Absolute 0.1 0.0 - 0.6 K/uL    Basophils Absolute 0.0 0.0 - 0.2 K/uL   Comprehensive Metabolic Panel   Result Value Ref Range    Sodium 132 (L) 136 - 145 mmol/L    Potassium 4.2 3.5 - 5.1 mmol/L    Chloride 97 (L) 99 - 110 mmol/L    CO2 18 (L) 21 - 32 mmol/L    Anion Gap 17 (H) 3 - 16    Glucose 148 (H) 70 - 99 mg/dL    BUN 15 7 - 20 mg/dL    CREATININE 0.9 0.8 - 1.3 mg/dL    GFR Non-African American >60 >60    GFR African American >60 >60    Calcium 8.6 8.3 - 10.6 mg/dL    Total Protein 5.9 (L) 6.4 - 8.2 g/dL    Albumin 4.0 3.4 - 5.0 g/dL    Albumin/Globulin Ratio 2.1 1.1 - 2.2    Total Bilirubin 0.6 0.0 - 1.0 mg/dL    Alkaline Phosphatase 66 40 - 129 U/L    ALT 17 10 - 40 U/L    AST 24 15 - 37 U/L   Urinalysis with Reflex to Culture    Specimen: Urine   Result Value Ref Range    Color, UA YELLOW Straw/Yellow    Clarity, UA Clear Clear    Glucose, Ur Negative Negative mg/dL    Bilirubin Urine Negative Negative    Ketones, Urine Negative Negative mg/dL    Specific Gravity, UA 1.012 1.005 - 1.030    Blood, Urine Negative Negative    pH, UA 7.0 5.0 - 8.0    Protein, UA 30 (A) Negative mg/dL    Urobilinogen, Urine 0.2 <2.0 E.U./dL    Nitrite, Urine Negative Negative    Leukocyte Esterase, Urine Negative Negative    Microscopic Examination YES     Urine Type NotGiven     Urine Reflex to Culture Not Indicated    Troponin   Result Value Ref Range    Troponin 0.05 (H) <0.01 ng/mL   Brain Natriuretic Peptide   Result Value Ref Range    Pro-BNP 5,043 (H) 0 - 449 pg/mL   Lactic Acid   Result Value Ref Range    Lactic Acid 1.2 0.4 - 2.0 mmol/L   Ammonia   Result Value Ref Range    Ammonia <10 (L) 16 - 60 umol/L   Procalcitonin   Result Value Ref Range    Procalcitonin 0.05 0.00 - 0.15 ng/mL   Protime-INR   Result Value Ref Range    Protime 17.3 (H) 9.9 - 12.7 sec    INR 1.51 (H) 0.88 - 1.12   APTT   Result Value Ref Range    aPTT 36.0 26.2 - 38.6 sec   Troponin   Result Value Ref Range    Troponin 0.05 (H) <0.01 ng/mL   Microscopic Urinalysis   Result Value Ref Range    Hyaline Casts, UA 2 0 - 8 /LPF    WBC, UA 1 0 - 5 /HPF    RBC, UA 1 0 - 4 /HPF    Epithelial Cells, UA 3 0 - 5 /HPF   Basic Metabolic Panel w/ Reflex to MG   Result Value Ref Range    Sodium 129 (L) 136 - 145 mmol/L    Potassium reflex Magnesium 3.8 3.5 - 5.1 mmol/L Chloride 98 (L) 99 - 110 mmol/L    CO2 21 21 - 32 mmol/L    Anion Gap 10 3 - 16    Glucose 107 (H) 70 - 99 mg/dL    BUN 17 7 - 20 mg/dL    CREATININE 1.0 0.8 - 1.3 mg/dL    GFR Non-African American >60 >60    GFR African American >60 >60    Calcium 8.3 8.3 - 10.6 mg/dL   CBC   Result Value Ref Range    WBC 4.2 4.0 - 11.0 K/uL    RBC 3.72 (L) 4.20 - 5.90 M/uL    Hemoglobin 11.4 (L) 13.5 - 17.5 g/dL    Hematocrit 33.3 (L) 40.5 - 52.5 %    MCV 89.4 80.0 - 100.0 fL    MCH 30.6 26.0 - 34.0 pg    MCHC 34.3 31.0 - 36.0 g/dL    RDW 15.4 12.4 - 15.4 %    Platelets 760 (L) 482 - 450 K/uL    MPV 8.4 5.0 - 10.5 fL   Hemoglobin A1c   Result Value Ref Range    Hemoglobin A1C 5.9 See comment %    eAG 122.6 mg/dL   Lipid panel - fasting   Result Value Ref Range    Cholesterol, Total 106 0 - 199 mg/dL    Triglycerides 50 0 - 150 mg/dL    HDL 54 40 - 60 mg/dL    LDL Calculated 42 <100 mg/dL    VLDL Cholesterol Calculated 10 Not Established mg/dL   POCT Glucose   Result Value Ref Range    Glucose 141 mg/dL    QC OK?  yes    POCT Glucose   Result Value Ref Range    POC Glucose 141 (H) 70 - 99 mg/dl    Performed on ACCU-CHEK    POCT Glucose   Result Value Ref Range    POC Glucose 188 (H) 70 - 99 mg/dl    Performed on ACCU-CHEK    POCT Glucose   Result Value Ref Range    POC Glucose 176 (H) 70 - 99 mg/dl    Performed on ACCU-CHEK    POCT Glucose   Result Value Ref Range    POC Glucose 127 (H) 70 - 99 mg/dl    Performed on ACCU-CHEK    POCT Glucose   Result Value Ref Range    POC Glucose 95 70 - 99 mg/dl    Performed on ACCU-CHEK    POCT Glucose   Result Value Ref Range    POC Glucose 107 (H) 70 - 99 mg/dl    Performed on ACCU-CHEK    EKG 12 Lead   Result Value Ref Range    Ventricular Rate 71 BPM    Atrial Rate 71 BPM    P-R Interval 234 ms    QRS Duration 106 ms    Q-T Interval 390 ms    QTc Calculation (Bazett) 423 ms    P Axis 1 degrees    R Axis -20 degrees    T Axis -46 degrees    Diagnosis       Sinus rhythm with 1st degree A-V blockInferior infarct (cited on or before 05-MAY-2017)Nonspecific T wave abnormalityAbnormal ECGWhen compared with ECG of 16-JUL-2019 14:06,TN interval has increasedConfirmed by Ameya BOURNE MD ([id number]) on 2/22/2022 9:59:14 AM     No results found. ED Medication Orders (From admission, onward)    Start Ordered     Status Ordering Provider    02/22/22 0900 02/21/22 2017  aspirin EC tablet 81 mg  DAILY        \"Or\" Linked Group Details    Last MAR action: Given - by Ginny Worrell on 02/22/22 at [address], [facility]    02/22/22 0900 02/21/22 2017  aspirin suppository 300 mg  DAILY        \"Or\" Linked Group Details    Last MAR action: See Alternative - by Ginny Worrell on 02/22/22 at [address], [facility]    02/22/22 0900 02/21/22 2041  [Held by provider]  ferrous sulfate EC tablet 325 mg  DAILY        (Held by provider since Mon 2/21/2022 at 2129 by Kenia Tafoya DO. Hold Reason: Other.)    Last MAR action: Automatically Held - by Edman Imus on 02/25/22 at 0900 [facility]    02/22/22 0900 02/21/22 2041  [Held by provider]  losartan (COZAAR) tablet 25 mg  DAILY        (Held by provider since Mon 2/21/2022 at 2129 by Kenia Tafoya DO. Hold Reason: Other.)    Last MAR action: Automatically Held - by Edman Imus on 02/25/22 at 0900 [facility]    02/22/22 0900 02/21/22 2041  tamsulosin (FLOMAX) capsule 0.4 mg  DAILY         Last MAR action: Given - by Ginny Worrell on 02/22/22 at [address] [facility]    02/22/22 0700 02/21/22 2041  pantoprazole (PROTONIX) tablet 40 mg  DAILY BEFORE BREAKFAST         Last MAR action: Given - by Ginny Worrell on 02/22/22 at [address], [facility]    02/21/22 2145 02/21/22 2129  insulin lispro (HUMALOG) injection vial 0-6 Units  6 times per day         Last MAR action: Not Given - by Ginny Worrell on 02/22/22 at [address] [facility], [address]    02/21/22 2130 02/21/22 2130  hydrALAZINE (APRESOLINE) injection 10 mg  EVERY 6 HOURS PRN         Acknowledged Claude Weeks 02/21/22 2100 02/21/22 2017  sodium chloride flush 0.9 % injection 10 mL  2 times per day         Last MAR action: Given - by Ginny Worrell on 02/22/22 at [address] [address],[address], [facility]    02/21/22 2100 02/21/22 2017  [Held by provider]  atorvastatin (LIPITOR) tablet 40 mg  NIGHTLY        (Held by provider since Mon 2/21/2022 at 2129 by Kenia Tafoya DO. Hold Reason: Other.)    Last MAR action: Automatically Held on 02/27/22 at [address] [place], [facility]    02/21/22 2100 02/21/22 2041  ALPRAZolam (XANAX) tablet 0.75 mg  NIGHTLY PRN         Acknowledged DEVAUGHN REDDY    02/21/22 2100 02/21/22 2041  [Held by provider]  carvedilol (COREG) tablet 12.5 mg  2 TIMES DAILY        (Held by provider since Mon 2/21/2022 at 2129 by Kenia Tafoya DO. Hold Reason: Other.)    Last MAR action: Automatically Held on 02/27/22 at [address] [facility]    02/21/22 2100 02/21/22 2041  [Held by provider]  donepezil (ARICEPT) tablet 10 mg  NIGHTLY        (Held by provider since Mon 2/21/2022 at 2129 by Kenia Tafoya DO. Hold Reason: Other.)    Last MAR action: Automatically Held on 02/27/22 at [address] [facility]    02/21/22 2100 02/21/22 2041  [Held by provider]  furosemide (LASIX) tablet 40 mg  2 TIMES DAILY        (Held by provider since Mon 2/21/2022 at 2129 by Kenia Tafoya DO. Hold Reason: Other.)    Last MAR action: Automatically Held on 02/27/22 at [address] [place] [address]    02/21/22 2040 02/21/22 2041  oxyCODONE-acetaminophen (PERCOCET) 5-325 MG per tablet 1 tablet  3 TIMES DAILY PRN         Acknowledged DEVAUGHN REDDY    02/21/22 2039 02/21/22 2039  glucose (GLUTOSE) 40 % oral gel 15 g  PRN         Acknowledged DEVAUGHN REDDY    02/21/22 2039 02/21/22 2039  dextrose 50 % IV solution  PRN         Acknowledged DEVAUGHN REDDY    02/21/22 2039 02/21/22 2039  glucagon (rDNA) injection 1 mg  PRN         Acknowledged DEVAUGHN REDDY    02/21/22 2039 02/21/22 2039  dextrose 5 % solution  PRN         Acknowledged DEVAUGHN REDDY    02/21/22 2017 02/21/22 2017  perflutren lipid microspheres (DEFINITY) injection 1.65 mg  IMG ONCE PRN         [facility]    02/21/22 2017 02/21/22 2017  sodium chloride flush 0.9 % injection 10 mL  PRN         [facility]ged [facility]    02/21/22 2017 02/21/22 2017  0.9 % sodium chloride infusion  PRN         [facility]    02/21/22 2017 02/21/22 2017  ondansetron (ZOFRAN) injection 4 mg  EVERY 6 HOURS PRN        \"Or\" Linked Group Details    [facility]    02/21/22 1730 02/21/22 1724  aspirin chewable tablet 81 mg  ONCE         Last MAR action: Given - by Estephania Whipple on 02/21/22 at [address]    02/21/22 1455 02/21/22 1457  iopamidol (ISOVUE-370) 76 % injection 75 mL  IMG ONCE PRN         Last MAR action: Given - by Reza Nixon on 02/21/22 at [address]          Final Impression      1. TIA (transient ischemic attack)        DISPOSITION         This record is transcribed utilizing voice recognition technology. There are inherent limitations in this technology. In addition, there may be limitations in editing of this report. If there are any discrepancies, please contact me directly.     Gaurav Skinner MD   2/22/2022         Sofi Castellano MD  02/22/22 [id number]

## 2022-02-21 NOTE — ED NOTES
Bed: B-10  Expected date:   Expected time:   Means of arrival:   Comments:  [address], RN  02/21/22 9614

## 2022-02-21 NOTE — PROGRESS NOTES
Medication Reconciliation    List of medications patient is currently taking is complete. Source of information: 1. Conversation with patient/family at bedside                                      2. EPIC records      Allergies  Ambien [zolpidem] and Labetalol     Notes regarding home medications:   1. Patient received all of his morning home medications prior to arrival to the emergency department today. 2. Patient is anticoagulated on Warfarin:      [Indication: AFIB, Target INR: 2-3, Current dose: 7.5 mg on FRI/SAT and 5 mg ALL OTHER DAYS] - Patient did not have his Warfarin yet today.     Aggie Melvin, [address] [place] [address], PharmD, BCPS  2/21/2022 4:34 PM

## 2022-02-21 NOTE — ED PROVIDER NOTES
SOCIAL & FAMILY HISTORY    Social History     Socioeconomic History    Marital status:      Spouse name: Not on file    Number of children: Not on file    Years of education: Not on file    Highest education level: Not on file   Occupational History    Not on file   Tobacco Use    Smoking status: Never Smoker    Smokeless tobacco: Never Used   Vaping Use    Vaping Use: Never used   Substance and Sexual Activity    Alcohol use: No    Drug use: Never    Sexual activity: Not Currently     Partners: Female   Other Topics Concern    Not on file   Social History Narrative    Not on file     Social Determinants of Health     Financial Resource Strain:     Difficulty of Paying Living Expenses: Not on file   Food Insecurity:     Worried About Running Out of Food in the Last Year: Not on file    Zeinab of Food in the Last Year: Not on file   Transportation Needs:     Lack of Transportation (Medical): Not on file    Lack of Transportation (Non-Medical):  Not on file   Physical Activity:     Days of Exercise per Week: Not on file    Minutes of Exercise per Session: Not on file   Stress:     Feeling of Stress : Not on file   Social Connections:     Frequency of Communication with Friends and Family: Not on file    Frequency of Social Gatherings with Friends and Family: Not on file    Attends Alevism Services: Not on file    Active Member of [address] [place] [address] [employer] or Organizations: Not on file    Attends Club or Organization Meetings: Not on file    Marital Status: Not on file   Intimate Partner Violence:     Fear of Current or Ex-Partner: Not on file    Emotionally Abused: Not on file    Physically Abused: Not on file    Sexually Abused: Not on file   Housing Stability:     Unable to Pay for Housing in the Last Year: Not on file    Number of [place] in the Last Year: Not on file    Unstable Housing in the Last Year: Not on file     Family History   Problem Relation Age of Onset    Stroke Mother        PHYSICAL EXAM    VITAL SIGNS: BP (!) 161/77   Pulse 71   Temp 97.6 °F (36.4 °C) (Oral)   Resp 15   Wt 148 lb 1.6 oz (67.2 kg)   SpO2 100%   BMI 23.20 kg/m²    Constitutional:  Well developed, well nourished, no acute distress   HENT:  Atraumatic, moist mucus membranes  Neck: supple, no JVD   Respiratory: Breath sounds clear throughout auscultation. Respirations are even and unlabored. Cardiovascular: Regular rhythm and rate, S1-S2. No murmur   vascular: Radial and DP pulses 2+ and equal bilaterally  GI:  Soft, nontender, normal bowel sounds  Musculoskeletal:  no lower extremity edema, no lower extremity asymmetry, no calf tenderness, no thigh tenderness, no acute deformities  Integument:  Skin is warm and dry, no petechiae   Neurologic: Awake, not talking, makes eye contact. He is not following directions. He keeps picking his blanket up and moving around with what appears to be equal movement and strength in his upper extremities. EKG    Per Dr. Thomas Bernal   Final Result   1. No large vessel occlusion or significant stenosis of the intracranial   arteries. 2. Bilateral proximal internal carotid artery stenosis measuring 40% on the   right and 20% on the left. 3. Bilateral vertebral artery origin stenosis, moderate on the right and   severe on the left. RECOMMENDATIONS:   Unavailable         CT HEAD WO CONTRAST   Final Result   No acute intracranial abnormality. Mild atrophy and white matter disease unchanged. Findings were discussed with Dr Esperanza Mcdonnell at 3:12 pm on 2/21/2022. XR CHEST PORTABLE   Final Result   No radiographic evidence of acute pulmonary disease.            Labs Reviewed   CBC WITH AUTO DIFFERENTIAL - Abnormal; Notable for the following components:       Result Value    WBC 3.7 (*)     RBC 3.84 (*)     Hemoglobin 11.8 (*)     Hematocrit 34.7 (*)     Platelets 241 (*)     Lymphocytes Absolute 0.7 (*)     All other components within normal limits    Narrative:     Performed at:  [facility]  [address] [facility] [address] [address]   Phone [phone]   COMPREHENSIVE METABOLIC PANEL - Abnormal; Notable for the following components:    Sodium 132 (*)     Chloride 97 (*)     CO2 18 (*)     Anion Gap 17 (*)     Glucose 148 (*)     Total Protein 5.9 (*)     All other components within normal limits    Narrative:     Performed at:  [facility]  [address] [address] [address]   Phone [phone]   URINALYSIS WITH REFLEX TO CULTURE - Abnormal; Notable for the following components:    Protein, UA 30 (*)     All other components within normal limits    Narrative:     Performed at:  [facility]  [address] [facility] [address] [address]   Phone [phone]   TROPONIN - Abnormal; Notable for the following components:    Troponin 0.05 (*)     All other components within normal limits    Narrative:     Performed at:  [facility]  [address] [place], [facility] [address] [address]   Phone [phone]   BRAIN NATRIURETIC PEPTIDE - Abnormal; Notable for the following components:    Pro-BNP 5,043 (*)     All other components within normal limits    Narrative:     Performed at:  [facility]  [address] [facility] [address] [address]   Phone [phone]   AMMONIA - Abnormal; Notable for the following components:    Ammonia <10 (*)     All other components within normal limits    Narrative:     Performed at:  [facility]  [address] [facility] [address] [address]   Phone [phone]   PROTIME-INR - Abnormal; Notable for the following components:    Protime 17.3 (*)     INR 1.51 (*)     All other components within normal limits    Narrative:     Performed at:  [facility]  [address] [place] [address]., [place] [address]   Phone [phone]   TROPONIN - Abnormal; Notable for the following components:    Troponin 0.05 (*)     All other components within normal limits    Narrative:     Performed at:  [facility]  [address] [facility] [place] [place] [employer] [address]   Phone [phone]   POCT GLUCOSE - Abnormal; Notable for the following components:    POC Glucose 141 (*)     All other components within normal limits    Narrative:     Performed at:  [facility]  [address] [facility] [place] [address]   Phone [phone]   POCT GLUCOSE - Normal   LACTIC ACID    Narrative:     Performed at:  [facility]  [address] [facility] [place] [employer] [address]   Phone [phone]   PROCALCITONIN    Narrative:     Performed at:  [facility]  [address] [place] [address] [place] [employer] [address]   Phone [phone]   APTT    Narrative:     Performed at:  [facility]  [address] [place] [address] [place] [employer] [address]   Phone [phone]   MICROSCOPIC URINALYSIS    Narrative:     Performed at:  [facility]  [address] [place] [address] [place] [employer] [address]   Phone [phone]       ED COURSE & MEDICAL DECISION MAKING    Pertinent Labs & Imaging studies reviewed and interpreted. (See chart for details)    See chart for details of medications given during the ED stay. Vitals:    02/21/22 1352 02/21/22 1540   BP: (!) 168/89 (!) 161/77   Pulse: 71 71   Resp: 17 15   Temp: 97.6 °F (36.4 °C)    TempSrc: Oral    SpO2: 100% 100%   Weight: 148 lb 1.6 oz (67.2 kg)      Medications   aspirin chewable tablet 81 mg (has no administration in time range)   iopamidol (ISOVUE-370) 76 % injection 75 mL (75 mLs IntraVENous Given 2/21/22 1503)     Patient was seen evaluated by myself my attending physician Dr. Sabine White. Nontoxic in appearance and hemodynamically stable. Differential diagnosis includes was not limited to too much opioid, stroke, TIA, infection, other. He had an abrupt onset of altered mental status according to the son-in-law while he was eating lunch at 1 PM.    He received Narcan in which he became unresponsive. He takes Percocet for chronic back pain. Initially there was no family at bedside but tiera reported that the family had a concern for stroke. Initially a code stroke was not called however sometime after the patient was here that the son-in-law came to the bedside and told us the story that he had an abrupt onset of stopping talking and became altered at 1 PM therefore a code stroke was called. His order set was changed to code stroke order set. Dr. Leticia Guo spoke with  stroke team. Not a candidate for TPA. Unable to get NIH score based on patient's confusion. He has a white count of 3.7 with a hemoglobin of 11.8 and hematocrit of 34.7. Sodium 132, potassium 4.2, chloride 97, CO2 18, anion gap 17, glucose 148, BUN 15 with creatinine of 0.9. Blood sugar on arrival was 141. Troponin is 0.05. Is chronically elevated at 0.03 and 0.04. Troponin was rechecked after 2 hours and it remains at 0.05. Urinalysis unremarkable for infection. CTA of the head and neck with contrast interpreted by radiology and reviewed by myself showed no large vessel occlusion or significant stenosis of the intracranial arteries. Bilateral proximal internal carotid artery stenosis measuring 40% on the right and 20% on the left. Bilateral vertebral artery origin stenosis, moderate on the right and severe on the left. CT head without interpreted by radiology and reviewed by myself showed no acute intracranial abnormality. Mild atrophy and white matter disease unchanged. Upon reevaluation the patient is completely lucid. He is sitting up he is appropriate he is talking. He is completely alert and oriented. Speech is not slurred.   NIH stroke scale score is 0.  Unremarkable neuro exam.  He does not recall what happened. The patient will be admitted to the hospitalist service for further evaluation. The patient was updated on the plan of care and he agrees. CRITICAL CARE NOTE:  There was a high probability of clinically significant life-threatening deterioration of the patient's condition requiring my urgent intervention. This includes multiple reevaluations, vital sign monitoring, pulse oximetry monitoring, telemetry monitoring, clinical response to the IV medications, reviewing the nursing notes, consultation time, dictation/documentation time, and interpretation of the labwork. (This time excludes time spent performing procedures). I personally saw the patient and independently provided 33 minutes of non-concurrent critical care out of the total shared critical care time provided. FINAL IMPRESSION    1.  TIA (transient ischemic attack)        PLAN  Admission to the hospital    (Please note that this note was completed with a voice recognition program.  Every attempt was made to edit the dictations, but inevitably there remain words that are mis-transcribed.)        Alfred [facility], GEINA - CNP  02/21/22 [address] [place] [address], GENIA - CARLENE  02/21/22 2003

## 2022-02-22 LAB
ANION GAP SERPL CALCULATED.3IONS-SCNC: 10 MMOL/L (ref 3–16)
BUN BLDV-MCNC: 17 MG/DL (ref 7–20)
CALCIUM SERPL-MCNC: 8.3 MG/DL (ref 8.3–10.6)
CHLORIDE BLD-SCNC: 98 MMOL/L (ref 99–110)
CHOLESTEROL, TOTAL: 106 MG/DL (ref 0–199)
CO2: 21 MMOL/L (ref 21–32)
CREAT SERPL-MCNC: 1 MG/DL (ref 0.8–1.3)
EKG ATRIAL RATE: 71 BPM
EKG DIAGNOSIS: NORMAL
EKG P AXIS: 1 DEGREES
EKG P-R INTERVAL: 234 MS
EKG Q-T INTERVAL: 390 MS
EKG QRS DURATION: 106 MS
EKG QTC CALCULATION (BAZETT): 423 MS
EKG R AXIS: -20 DEGREES
EKG T AXIS: -46 DEGREES
EKG VENTRICULAR RATE: 71 BPM
ESTIMATED AVERAGE GLUCOSE: 122.6 MG/DL
GFR AFRICAN AMERICAN: >60
GFR NON-AFRICAN AMERICAN: >60
GLUCOSE BLD-MCNC: 107 MG/DL (ref 70–99)
GLUCOSE BLD-MCNC: 107 MG/DL (ref 70–99)
GLUCOSE BLD-MCNC: 127 MG/DL (ref 70–99)
GLUCOSE BLD-MCNC: 170 MG/DL (ref 70–99)
GLUCOSE BLD-MCNC: 176 MG/DL (ref 70–99)
GLUCOSE BLD-MCNC: 179 MG/DL (ref 70–99)
GLUCOSE BLD-MCNC: 95 MG/DL (ref 70–99)
HBA1C MFR BLD: 5.9 %
HCT VFR BLD CALC: 33.3 % (ref 40.5–52.5)
HDLC SERPL-MCNC: 54 MG/DL (ref 40–60)
HEMOGLOBIN: 11.4 G/DL (ref 13.5–17.5)
INR BLD: 1.3 (ref 0.88–1.12)
LDL CHOLESTEROL CALCULATED: 42 MG/DL
MCH RBC QN AUTO: 30.6 PG (ref 26–34)
MCHC RBC AUTO-ENTMCNC: 34.3 G/DL (ref 31–36)
MCV RBC AUTO: 89.4 FL (ref 80–100)
PDW BLD-RTO: 15.4 % (ref 12.4–15.4)
PERFORMED ON: ABNORMAL
PERFORMED ON: NORMAL
PLATELET # BLD: 108 K/UL (ref 135–450)
PMV BLD AUTO: 8.4 FL (ref 5–10.5)
POTASSIUM REFLEX MAGNESIUM: 3.8 MMOL/L (ref 3.5–5.1)
PROTHROMBIN TIME: 14.9 SEC (ref 9.9–12.7)
RBC # BLD: 3.72 M/UL (ref 4.2–5.9)
SODIUM BLD-SCNC: 129 MMOL/L (ref 136–145)
TRIGL SERPL-MCNC: 50 MG/DL (ref 0–150)
VLDLC SERPL CALC-MCNC: 10 MG/DL
WBC # BLD: 4.2 K/UL (ref 4–11)

## 2022-02-22 PROCEDURE — 92523 SPEECH SOUND LANG COMPREHEN: CPT

## 2022-02-22 PROCEDURE — 80048 BASIC METABOLIC PNL TOTAL CA: CPT

## 2022-02-22 PROCEDURE — 6370000000 HC RX 637 (ALT 250 FOR IP): Performed by: FAMILY MEDICINE

## 2022-02-22 PROCEDURE — 97162 PT EVAL MOD COMPLEX 30 MIN: CPT

## 2022-02-22 PROCEDURE — 2580000003 HC RX 258: Performed by: INTERNAL MEDICINE

## 2022-02-22 PROCEDURE — 97530 THERAPEUTIC ACTIVITIES: CPT

## 2022-02-22 PROCEDURE — 83036 HEMOGLOBIN GLYCOSYLATED A1C: CPT

## 2022-02-22 PROCEDURE — 36415 COLL VENOUS BLD VENIPUNCTURE: CPT

## 2022-02-22 PROCEDURE — 6370000000 HC RX 637 (ALT 250 FOR IP): Performed by: INTERNAL MEDICINE

## 2022-02-22 PROCEDURE — 85610 PROTHROMBIN TIME: CPT

## 2022-02-22 PROCEDURE — 80061 LIPID PANEL: CPT

## 2022-02-22 PROCEDURE — 6370000000 HC RX 637 (ALT 250 FOR IP): Performed by: STUDENT IN AN ORGANIZED HEALTH CARE EDUCATION/TRAINING PROGRAM

## 2022-02-22 PROCEDURE — 97535 SELF CARE MNGMENT TRAINING: CPT

## 2022-02-22 PROCEDURE — 85027 COMPLETE CBC AUTOMATED: CPT

## 2022-02-22 PROCEDURE — 93010 ELECTROCARDIOGRAM REPORT: CPT | Performed by: INTERNAL MEDICINE

## 2022-02-22 PROCEDURE — 2060000000 HC ICU INTERMEDIATE R&B

## 2022-02-22 PROCEDURE — 97166 OT EVAL MOD COMPLEX 45 MIN: CPT

## 2022-02-22 PROCEDURE — 92610 EVALUATE SWALLOWING FUNCTION: CPT

## 2022-02-22 RX ORDER — WARFARIN SODIUM 7.5 MG/1
7.5 TABLET ORAL
Status: COMPLETED | OUTPATIENT
Start: 2022-02-22 | End: 2022-02-22

## 2022-02-22 RX ORDER — WARFARIN SODIUM 5 MG/1
5 TABLET ORAL DAILY
Status: DISCONTINUED | OUTPATIENT
Start: 2022-02-22 | End: 2022-02-22

## 2022-02-22 RX ADMIN — PANTOPRAZOLE SODIUM 40 MG: 40 TABLET, DELAYED RELEASE ORAL at 09:26

## 2022-02-22 RX ADMIN — INSULIN LISPRO 1 UNITS: 100 INJECTION, SOLUTION INTRAVENOUS; SUBCUTANEOUS at 20:36

## 2022-02-22 RX ADMIN — INSULIN LISPRO 1 UNITS: 100 INJECTION, SOLUTION INTRAVENOUS; SUBCUTANEOUS at 00:30

## 2022-02-22 RX ADMIN — ASPIRIN 81 MG: 81 TABLET, COATED ORAL at 09:26

## 2022-02-22 RX ADMIN — WARFARIN SODIUM 7.5 MG: 7.5 TABLET ORAL at 19:37

## 2022-02-22 RX ADMIN — SODIUM CHLORIDE, PRESERVATIVE FREE 10 ML: 5 INJECTION INTRAVENOUS at 09:27

## 2022-02-22 RX ADMIN — TAMSULOSIN HYDROCHLORIDE 0.4 MG: 0.4 CAPSULE ORAL at 09:26

## 2022-02-22 RX ADMIN — SODIUM CHLORIDE, PRESERVATIVE FREE 10 ML: 5 INJECTION INTRAVENOUS at 20:41

## 2022-02-22 RX ADMIN — INSULIN LISPRO 1 UNITS: 100 INJECTION, SOLUTION INTRAVENOUS; SUBCUTANEOUS at 18:02

## 2022-02-22 ASSESSMENT — PAIN DESCRIPTION - ORIENTATION: ORIENTATION: LOWER

## 2022-02-22 ASSESSMENT — PAIN SCALES - GENERAL
PAINLEVEL_OUTOF10: 8
PAINLEVEL_OUTOF10: 8

## 2022-02-22 ASSESSMENT — PAIN DESCRIPTION - ONSET: ONSET: UNABLE TO TELL

## 2022-02-22 ASSESSMENT — PAIN - FUNCTIONAL ASSESSMENT: PAIN_FUNCTIONAL_ASSESSMENT: PREVENTS OR INTERFERES SOME ACTIVE ACTIVITIES AND ADLS

## 2022-02-22 ASSESSMENT — PAIN DESCRIPTION - LOCATION
LOCATION: BACK
LOCATION: BACK

## 2022-02-22 ASSESSMENT — PAIN DESCRIPTION - DESCRIPTORS: DESCRIPTORS: ACHING

## 2022-02-22 ASSESSMENT — PAIN DESCRIPTION - PROGRESSION: CLINICAL_PROGRESSION: NOT CHANGED

## 2022-02-22 ASSESSMENT — PAIN DESCRIPTION - FREQUENCY: FREQUENCY: CONTINUOUS

## 2022-02-22 ASSESSMENT — PAIN DESCRIPTION - PAIN TYPE: TYPE: ACUTE PAIN;CHRONIC PAIN

## 2022-02-22 NOTE — PROGRESS NOTES
Patient admitted from the ER. Patient on room air, with male [address] attached. Transferred safely onto bed. Vital signs acquired. NIHSS done, currently at 9. Patient did not pass swallow evaluation, maintained on NPO. Informed hospitalist regarding patient's condition. Orders acquired for change of meds and Blood sugar check every 4 hours.

## 2022-02-22 NOTE — PROGRESS NOTES
Patients son in [facility], brought in patient's cane and two pairs of glasses.   Electronically signed by Priscilla Christensen RN on 2/22/2022 at 3:41 PM

## 2022-02-22 NOTE — PROGRESS NOTES
Speech Language/Pathology   SPEECH LANGUAGE AND CLINICAL BEDSIDE SWALLOWING EVALUATION   Speech Therapy Department     Dayron Dean  AGE: 81 y.o. GENDER: male  : 1935  [phone]  EPISODE DATE:  2022    MEDICAL DIAGNOSIS:   Chief Complaint   Patient presents with    Altered Mental Status     family called squad for new confusion and generalized weakness concerned about stroke. unknown LKW. mobile stroke unit cleared pt for stroke. B. SBP: 150.       Onset: 2022    PAST MEDICAL HISTORY        Diagnosis Date    Atrial fibrillation ([address]     Diabetes mellitus ([facility] [address] [address]     unsure if diabetic per patient    Hypertension     MI (mitral incompetence)        PAST SURGICAL HISTORY    Past Surgical History:   Procedure Laterality Date    BACK SURGERY      CARDIAC DEFIBRILLATOR PLACEMENT      CORONARY ANGIOPLASTY WITH STENT PLACEMENT      ESOPHAGEAL DILATATION N/A 2019    ESOPHAGEAL DILATION Aniyah Acron performed by Luis M Diaz MD at [address] [facility] Left 3/25/2019    CLOSED REDUCTION PERCUTANEOUS LEFT HIP PINNING performed by Elizabeth Craven MD at [address] Left 2019    CLOSED REDUCTION PERCUTANEOUS LEFT HIP PINNING POSSIBLE LEFT HIP HEMIARTHROPLASTY    JOINT REPLACEMENT Bilateral     knees    PACEMAKER INSERTION      UPPER GASTROINTESTINAL ENDOSCOPY N/A 2019    EGD BIOPSY performed by Luis M Diaz MD at [address] [facility] N/A 2021    ESOPHAGOGASTRODUODENOSCOPY performed by Maribel Gonsales MD at [address] [facility]    Allergies   Allergen Reactions    Ambien [Zolpidem]      Rash      Labetalol Itching and Rash     CHART REVIEW: Patient admitted 22 with C/O AMS H&P Patient is a 59-year-old male with past medical history of atrial fibrillation diabetes mellitus hypertension who presents to the hospital for a brief episode of dysarthria generalized weakness while having lunch. According to the family at bedside patient was having lunch and all of a sudden he stopped talking, felt weak and was not able to hold conversations and he stopped eating. Patient was brought to the emergency department due to concern of a stroke. Patient mentions he still feels weak however according to family he is a speaking and orientation has improved. No reports of fevers chills belly pain chest pain. CHEST X-RAY 2/21/22  Impression   No radiographic evidence of acute pulmonary disease. CT HEAD 2/21/22  Impression   No acute intracranial abnormality.       Mild atrophy and white matter disease unchanged     MRI brain ordered 2/21/22       General  Chart Reviewed: Yes  Additional Pertinent Hx: TIA (2017)  Family / Caregiver Present: No  Subjective  Subjective: Patient accepted and tolerated evaluation at bedside        Vision: Impaired (Patient reporting \"I can't see\" and responded \"yes\" he wears glasses, unable to provide further information)  Hearing: Exceptions to [facility] (Suspect hard of hearing?)    Prior Status: (Diet consistency; Cognitive-Communication)      Reason for referral: Husam Vallejo was referred for a Speech-Language and Bedside Swallow Evaluation to assess the efficiency of communicative effectiveness; the efficiency of swallow function, rule out aspiration and make recommendations regarding safe dietary consistencies, effective compensatory strategies, and safe eating environment. Dysphagia Treatment Diagnosis: Oropharyngeal Dysphagia   Speech Language Treatment Diagnosis: Aphasia, cognitive communication     Cognitive diagnosis: unable to assess due to difficulty following dx and answering questions  Communication diagnosis: Uncertain if confusion impacted ability to engage in speech language assessment.  Pt unable to follow directions, some verbal output however patient with visible frustration with difficulty with word finding in short phrases, unable to follow dx to complete automatic phrase completion, named common objects with 25% accuracy, answered simple yes/no questions with 25% accuracy, unable to answer questions related to orientation      IMPRESSIONS  Dysphagia Diagnosis  Dysphagia Diagnosis: Mild oral stage dysphagia;Mild pharyngeal stage dysphagia  Dysphagia Impression :   · Accepted and tolerated evaluation at bedside. · Oriented x1, unable to follow dx and minimal verbal output. Pt alert and confused. Unable to follow dx for oral motor exam. Uncertain if confusion contributing to inability to follow dx and participate in language evaluation  · Mild oral dysphagia characterized by slow disorganized formation of bolus with concern for excessive labor with regular textures. Suspect reduced lingual coordination. · Mild pharyngeal dysphagia characterized by delayed swallow initiation and decreased laryngeal elevation. No overt s/s of aspiration or penetration across all consistencies. · Recommend soft and bite-sized/thin liquids. · ST to continue to follow 3-5x/week during acute admission. Recommended Diet:  Diet Solids Recommendation: Dysphagia Soft and Bite-Sized (Dysphagia III)  Liquid Consistency Recommendation: Thin       Strategies:   Compensatory Swallowing Strategies: Remain upright for 30-45 minutes after meals,Upright as possible for all oral intake,Small bites/sips      Plan:    Recommendations  D/C Recommendations: To be determined  Requires SLP Intervention: Yes    Therapy  Requires SLP Intervention: Yes  Therapeutic Interventions: Diet tolerance monitoring,Patient/Family education,Therapeutic PO trials with SLP  Duration/Frequency of Treatment: 3-5x/week during acute admission  cognitive-communication remediation    Dysphagia Goals  Dysphagia Goals:  The patient will tolerate recommended diet without observed clinical signs of aspiration,The patient will tolerate mechanical soft foods 10/10.,The patient/caregiver will demonstrate understanding of compensatory strategies for improved swallowing safety. ,The patient will tolerate thin liquids without signs and symptoms of aspiration 10/10 via cup. Speech and Language Goals  Goals:   Short-term Goals  Goal 1: The patient will participate in ongoing assessment with modification of goals as needed  Goal 2: The patient will communicate basic needs/wants utilizing any modality with 85% accuracy given min verbal cues  Goal 3: The patient will follow 1-step directions with 85% accuracy given min verbal cues  Goal 4: The patient will complete word finding tasks with 85% accuracy given min verbal cues    Barriers to Progress: Confusion, Cognitive deficit and Communication deficit    Prognosis  Prognosis for safe diet advancement: good  Barriers to reach goals: cognitive deficits    Education  Consulted and agree with results and recommendations: Patient,RN,MD  Patient Education: Patient educated on purpose of ST services/goals/recommendations  Patient Education Response: No evidence of learning      Discharge Recommendations:  Pt will benefit from continued skilled Speech Therapy for Speech and Dysphagia services, prior to returning home. Objective:  ASSESSMENT: Included Clinical Evaluation of Swallowing; Oral Motor Speech Evaluation and Cognitive-Linguistic Assessment  Vision: Impaired (Patient reporting \"I can't see\" and responded \"yes\" he wears glasses, unable to provide further information)  Hearing: Exceptions to [facility] (Suspect hard of hearing?)  Oral/Motor  Oral Motor:  (Unable to follow dx for exam)  Auditory Comprehension  Comprehension: Exceptions (Limited assessment as patient often unresponsive to questions)  Yes/No Questions:  Moderate  Basic Questions: Severe  Complex Questions: Severe  One Step Basic Commands: Severe  Common Objects: Severe  Reading Comprehension  Reading Status:  (Reports \"I can't read\" ?)  Expression  Primary Mode of Expression: Verbal  Verbal Expression  Verbal Expression: Exceptions to functional limits  Repetition: Severe  Automatic Speech: Severe  Convergent: Moderate  Conversation: Severe  Written Expression  Written Expression: Unable to assess  Motor Speech  Motor Speech: Within Functional Limits          Orientation  Overall Orientation Status: Impaired (Unable to assess due to aphasia)       Oral Phase Dysfunction  Oral Phase  Oral Phase: Exceptions  Oral Phase Dysfunction  Impaired Mastication: All  Oral Phase  Oral Phase - Comment: Mild oral dysphagia characterized by slow disorganized formation of bolus with concern for excessive labor with regular textures. Suspect reduced lingual coordination. Indicators of Pharyngeal Phase Dysfunction   Pharyngeal Phase  Pharyngeal Phase: Exceptions  Indicators of Pharyngeal Phase Dysfunction  Delayed Swallow: All  Decreased Laryngeal Elevation: All  Pharyngeal Phase   Pharyngeal: Mild pharyngeal dysphagia characterized by delayed swallow initiation and decreased laryngeal elevation. No overt s/s of aspiration or penetration across all consistencies. Please accept this as Speech Therapy Discharge status, if pt is discharged prior to next therapy session.     Timed Code Treatment:  Time In: 3952  SLP Individual Minutes  Time In: 8122  Time Out: 5294  Minutes: 46  Total Treatment Time: 46 total (26 for clinical swallow evaluation, 20 for speech lang eval)    Anne Garces, [address] [place] [address]  Speech-Language Pathologist  On 02/22/22 at 7:47 AM

## 2022-02-22 NOTE — H&P
Hospital Medicine History & Physical      PCP: Mireya Negron    Date of Admission: 2/21/2022    Chief Complaint: Episode of dysarthria,  weakness    History Of Present Illness:  Patient is a 28-year-old male with past medical history of atrial fibrillation diabetes mellitus hypertension who presents to the hospital for a brief episode of dysarthria generalized weakness while having lunch. According to the family at bedside patient was having lunch and all of a sudden he stopped talking, felt weak and was not able to hold conversations and he stopped eating. Patient was brought to the emergency department due to concern of a stroke. Patient mentions he still feels weak however according to family he is a speaking and orientation has improved. No reports of fevers chills belly pain chest pain.       Past Medical History:          Diagnosis Date    Atrial fibrillation ([address]     Diabetes mellitus ([address]     unsure if diabetic per patient    Hypertension     MI (mitral incompetence)        Past Surgical History:          Procedure Laterality Date    BACK SURGERY      CARDIAC DEFIBRILLATOR PLACEMENT      CORONARY ANGIOPLASTY WITH STENT PLACEMENT      ESOPHAGEAL DILATATION N/A 8/8/2019    ESOPHAGEAL DILATION MAURO performed by Jere Smith MD at [address] [facility] Left 3/25/2019    CLOSED REDUCTION PERCUTANEOUS LEFT HIP PINNING performed by Ripley Rinne, MD at [address] Left 03/25/2019    CLOSED REDUCTION PERCUTANEOUS LEFT HIP PINNING POSSIBLE LEFT HIP HEMIARTHROPLASTY    JOINT REPLACEMENT Bilateral     knees    PACEMAKER INSERTION      UPPER GASTROINTESTINAL ENDOSCOPY N/A 8/8/2019    EGD BIOPSY performed by Jere Smith MD at [address] [facility] N/A 8/25/2021    ESOPHAGOGASTRODUODENOSCOPY performed by Ranny Holter, MD at [facility]       Medications Prior to Admission:      Prior to Admission medications    Medication Sig Start Date End Date Taking? Authorizing Provider   oxyCODONE-acetaminophen (PERCOCET) 5-325 MG per tablet Take 1 tablet by mouth 3 times daily as needed for Pain. Yes Historical Provider, MD   donepezil (ARICEPT) 10 MG tablet Take 10 mg by mouth nightly   Yes Historical Provider, MD   ALPRAZolam (XANAX) 0.25 MG tablet Take 0.75 mg by mouth nightly as needed for Sleep. Yes Historical Provider, MD   nabumetone (RELAFEN) 750 MG tablet Take 750 mg by mouth 2 times daily as needed for Pain    Yes Historical Provider, MD   warfarin (COUMADIN) 2.5 MG tablet Take 5-7.5 mg by mouth every evening 5 mg daily EXCEPT and 7.5 mg FRI/SAT   Yes Historical Provider, MD   docusate sodium (COLACE, DULCOLAX) 100 MG CAPS Take 100 mg by mouth 2 times daily 3/27/19  Yes Ruddy Gutierrez MD   losartan (COZAAR) 25 MG tablet Take 25 mg by mouth daily   Yes Historical Provider, MD   atorvastatin (LIPITOR) 20 MG tablet Take 1 tablet by mouth daily 5/7/17  Yes Adelina Hudson DO   tamsulosin (FLOMAX) 0.4 MG capsule Take 0.4 mg by mouth daily 10/7/12  Yes Historical Provider, MD   levocetirizine (XYZAL) 5 MG tablet Take 5 mg by mouth daily   Yes Historical Provider, MD   omeprazole (PRILOSEC) 20 MG delayed release capsule Take 40 mg by mouth daily    Yes Historical Provider, MD   carvedilol (COREG) 12.5 MG tablet Take 1 tablet by mouth 2 times daily 3/8/17  Yes Historical Provider, MD   ferrous sulfate 325 (65 FE) MG tablet Take 325 mg by mouth daily   Yes Historical Provider, MD   furosemide (LASIX) 40 MG tablet Take 1 tablet by mouth 2 times daily  11/25/16  Yes Historical Provider, MD       Allergies:  Ambien [zolpidem] and Labetalol    Social History:      TOBACCO:   reports that he has never smoked. He has never used smokeless tobacco.  ETOH:   reports no history of alcohol use.       Family History:       Reviewed in detail and non contributory          Problem Relation Age of Onset    Stroke Mother REVIEW OF SYSTEMS:   Pertinent positives as noted in the HPI. All other systems reviewed and negative. PHYSICAL EXAM PERFORMED:    BP (!) 161/91   Pulse 107   Temp 98.2 °F (36.8 °C) (Oral)   Resp 14   Wt 148 lb 1.6 oz (67.2 kg)   SpO2 96%   BMI 23.20 kg/m²     General appearance:  No apparent distress, cooperative. HEENT:  Normal cephalic, atraumatic without obvious deformity. Conjunctivae/corneas clear. Neck: Supple, with full range of motion. No cervical lymphadenopathy  Respiratory:  Normal respiratory effort. Clear to auscultation, bilaterally without Rales/Wheezes/Rhonchi. Cardiovascular:  Regular rate and rhythm with normal S1/S2 without murmurs, rubs or gallops. Abdomen: Soft, non-tender, non-distended, normal bowel sounds. Musculoskeletal:  No edema noted bilaterally. No tenderness on palpation   Skin: no rash visible  Neurologic:  Neurologically intact without any focal sensory/motor deficits. grossly non-focal.  Psychiatric:  Alert and oriented, normal mood  Peripheral Pulses: +2 palpable, equal bilaterally       Labs:     Recent Labs     02/21/22  1402   WBC 3.7*   HGB 11.8*   HCT 34.7*   *     Recent Labs     02/21/22  1402   *   K 4.2   CL 97*   CO2 18*   BUN 15   CREATININE 0.9   CALCIUM 8.6     Recent Labs     02/21/22  1402   AST 24   ALT 17   BILITOT 0.6   ALKPHOS 66     Recent Labs     02/21/22  1402   INR 1.51*     Recent Labs     02/21/22  1402 02/21/22  1554   TROPONINI 0.05* 0.05*       Urinalysis:      Lab Results   Component Value Date    NITRU Negative 02/21/2022    WBCUA 1 02/21/2022    BACTERIA 2+ 10/14/2015    RBCUA 1 02/21/2022    BLOODU Negative 02/21/2022    SPECGRAV 1.012 02/21/2022    GLUCOSEU Negative 02/21/2022    GLUCOSEU NEGATIVE 12/06/2011       Radiology:       CTA HEAD NECK W CONTRAST   Final Result   1. No large vessel occlusion or significant stenosis of the intracranial   arteries.    2. Bilateral proximal internal carotid artery stenosis measuring 40% on the   right and 20% on the left. 3. Bilateral vertebral artery origin stenosis, moderate on the right and   severe on the left. RECOMMENDATIONS:   Unavailable         CT HEAD WO CONTRAST   Final Result   No acute intracranial abnormality. Mild atrophy and white matter disease unchanged. Findings were discussed with Dr Vira García at 3:12 pm on 2/21/2022. XR CHEST PORTABLE   Final Result   No radiographic evidence of acute pulmonary disease. MRI brain without contrast    (Results Pending)           Active Hospital Problems    Diagnosis Date Noted    AMS (altered mental status) [R41.82] 02/21/2022    Acute cerebrovascular accident (CVA) ([facility] [I63.9] 02/21/2022       Patient is a 71-year-old male with past medical history of atrial fibrillation diabetes mellitus hypertension who presents to the hospital for a brief episode of dysarthria generalized weakness while having lunch. According to the family at bedside patient was having lunch and all of a sudden he stopped talking, felt weak and was not able to hold conversations and he stopped eating. Patient was brought to the emergency department due to concern of a stroke. Patient mentions he still feels weak however according to family he is a speaking and orientation has improved. No reports of fevers chills belly pain chest pain.     Assessment  Episode of dysarthria, generalized weakness, rule out CVA  Hyponatremia  Elevated troponin likely demand ischemia  Atrial fibrillation on Coumadin  Diabetes mellitus  Hypertension      Plan  MRI brain without contrast, aspirin, statin, consult neurology, PT/OT/speech therapy consult  Check INR, consult pharmacy for Coumadin dosing  Resume home medications  Insulin sliding scale  DVT prophylaxis on Coumadin  Diet: Diet NPO  Code Status: Full Code    PT/OT Eval Status: ordered    Dispo - pending clinical improvement       Eduarda Portillo MD    The note was completed using EMR and

## 2022-02-22 NOTE — PROGRESS NOTES
Called patient's wife for the MRI checklist. Wife did not answer phone. Also called patient's daughter but was not able answer phone. Called patient's son Jaun Metzger for the MRI checklist. MRI check list accomplished. Updated son on patient's condition. Answered all questions.

## 2022-02-22 NOTE — PROGRESS NOTES
Physical Therapy    Facility/Department: [address] PROGRESSIVE CARE  Initial Assessment/Treatment Session    NAME: Samir Mckoy  : 1935  MRN: [id number]    Date of Service: 2022    Discharge Recommendations:  Patient would benefit from continued therapy after discharge,3-5 sessions per week   PT Equipment Recommendations  Equipment Needed: No    Assessment   Body structures, Functions, Activity limitations: Decreased functional mobility ; Decreased safe awareness;Decreased cognition;Decreased balance  Assessment: Pt is an 81 y.o. M. admitted  for CVA R/O. He presents oriented to person and place, but seems somewhat confused regarding situation. He had difficulty following commands for strength/mobility testing, and required use of stedy to use the commode. He would benefit from continued therapy to gradually progress OOB activity as able. At this time, PT recommends low-moderate frequency therapy upon D/C. Samir Mckoy scored a  on the AM-PAC short mobility form. Current research shows that an AM-PAC score of 17 or less is typically not associated with a discharge to the patient's home setting. Based on the patient's AM-PAC score and their current functional mobility deficits, it is recommended that the patient have 3-5 sessions per week of Physical Therapy at d/c to increase the patient's independence. Please see assessment section for further patient specific details. If patient discharges prior to next session this note will serve as a discharge summary. Please see below for the latest assessment towards goals. Specific instructions for Next Treatment: Improve balance; practice transfers and ambulation  Prognosis: Good  Decision Making: Medium Complexity  History: See below  Exam: Strength; ROM; Balance; Transfers  Clinical Presentation: Evolving  PT Education: Goals; General Safety;Gait Training;Orientation;Plan of Care;PT Role;Functional Mobility Training;Home Exercise Program;Injury Prevention;Transfer Training  Barriers to Learning: Confusion  REQUIRES PT FOLLOW UP: Yes  Activity Tolerance  Activity Tolerance: Patient limited by cognitive status; Patient limited by fatigue       Patient Diagnosis(es): The encounter diagnosis was TIA (transient ischemic attack). has a past medical history of Atrial fibrillation ([facility] Diabetes mellitus ([facility] Hypertension, and MI (mitral incompetence). has a past surgical history that includes Coronary angioplasty with stent; Pacemaker insertion; Cardiac defibrillator placement; hernia repair; back surgery; hip surgery (Left, 03/25/2019); hip pinning (Left, 3/25/2019); joint replacement (Bilateral); Upper gastrointestinal endoscopy (N/A, 8/8/2019); Esophagus dilation (N/A, 8/8/2019); and Upper gastrointestinal endoscopy (N/A, 8/25/2021). Restrictions  Restrictions/Precautions  Restrictions/Precautions: Fall Risk  Implants present? : Pacemaker     Vision/Hearing  Vision: Impaired  Vision Exceptions: Wears glasses at all times  Hearing: Exceptions to [facility] Exceptions: Hard of hearing/hearing concerns       Subjective  General  Chart Reviewed: Yes  Patient assessed for rehabilitation services?: Yes  Additional Pertinent Hx: Patient is a 61-year-old male with past medical history of atrial fibrillation diabetes mellitus hypertension who presents to the hospital for a brief episode of dysarthria generalized weakness while having lunch. According to the family at bedside patient was having lunch and all of a sudden he stopped talking, felt weak and was not able to hold conversations and he stopped eating. Patient was brought to the emergency department due to concern of a stroke. Patient mentions he still feels weak however according to family he is a speaking and orientation has improved. No reports of fevers chills belly pain chest pain.   Response To Previous Treatment: Not applicable  Referring Practitioner: Dr. Vivian Gregory  Referral Date : 02/21/22  Diagnosis: CVA R/O; Hyponatremia; A-fib  Follows Commands: Impaired  Subjective  Subjective: Pt oriented to place, and person, but has some difficulty staying focused on tasks with therapist.  He appears uncomfortable, but did not specify what the problem is. Pain Screening  Patient Currently in Pain: Yes    Social/Functional History  Social/Functional History  Lives With: Spouse  Type of Home:  (Condo)  Home Layout: One level  Home Access: Stairs to enter with rails  Entrance Stairs - Number of Steps: 7+7  Entrance Stairs - Rails: Left  Bathroom Shower/Tub: Walk-in shower,Shower chair with back  Bathroom Toilet: Standard  Bathroom Accessibility: Walker accessible  Home Equipment: Rolling walker  ADL Assistance: Independent  Homemaking Assistance: Needs assistance (wife assist with all IADLs)  Homemaking Responsibilities: No  Ambulation Assistance: Independent (with RW)  Transfer Assistance: Independent    Objective    AROM RLE (degrees)  RLE AROM: WFL  AROM LLE (degrees)  LLE AROM : WFL  AROM RUE (degrees)  RUE AROM : WFL  AROM LUE (degrees)  LUE AROM : WFL    Strength RLE  Strength RLE: WFL  Strength LLE  Strength LLE: WFL  Strength RUE  Strength RUE: WFL  Strength LUE  Strength LUE: WFL    Tone RLE  RLE Tone: Normotonic  Tone LLE  LLE Tone: Normotonic  Motor Control  Gross Motor?: WFL     Bed mobility  Supine to Sit: Minimal assistance  Sit to Supine: Minimal assistance  Scooting: Dependent/Total     Transfers  Sit to Stand: Contact guard assistance (To stedy)  Stand to sit: Contact guard assistance  Comment: Total assist from bed to commode and back using stedy x 1.      Ambulation  Ambulation?: No  Stairs/Curb  Stairs?: No      Plan   Plan  Times per week: 2-3x  Specific instructions for Next Treatment: Improve balance; practice transfers and ambulation  Current Treatment Recommendations: Balance Training,Endurance Training,Functional Mobility Training,Transfer Training,Gait Training,Stair training,Neuromuscular Re-education,Home Exercise Program,Patient/Caregiver Education & Training,Safety Education & Training,Equipment Evaluation, Education, & procurement,Positioning,ADL/Self-care Training,Cognitive/Perceptual Training  Safety Devices  Type of devices: All fall risk precautions in place,Call light within reach,Bed alarm in place,Gait belt,Left in bed,Nurse notified  Restraints  Initially in place: No    AM-PAC Score  AM-PAC Inpatient Mobility Raw Score : 11 (02/22/22 1331)  AM-PAC Inpatient T-Scale Score : 33.86 (02/22/22 1331)  Mobility Inpatient CMS 0-100% Score: 72.57 (02/22/22 1331)  Mobility Inpatient CMS G-Code Modifier : CL (02/22/22 1331)          Goals  Short term goals  Time Frame for Short term goals:  In 2-3 days pt will perform  Short term goal 1: Bed mobility SBA  Short term goal 2: Transfers Min A  Short term goal 3: Ambulation 8' with walker, Min A  Patient Goals   Patient goals : Unable to state       Therapy Time   Individual Concurrent Group Co-treatment   Time In 1300         Time Out 1330         Minutes 30         Timed Code Treatment Minutes: 15 Minutes   Moderate Complexity Evaluation    Lora Foss PT    Electronically signed by Lora Foss, PT [id number] on 2/22/2022 at 1:36 PM

## 2022-02-22 NOTE — PROGRESS NOTES
4 Eyes Skin Assessment     NAME:  Tyler Mckinley  YOB: 1935  MEDICAL RECORD NUMBER:  [id number]    The patient is being assess for  Admission    I agree that 2 RN's have performed a thorough Head to Toe Skin Assessment on the patient. ALL assessment sites listed below have been assessed. Areas assessed by both nurses:    Head, Face, Ears, Shoulders, Back, Chest, Arms, Elbows, Hands, Sacrum. Buttock, Coccyx, Ischium and Legs. Feet and Heels        Does the Patient have a Wound? Yes wound(s) were present on assessment.  LDA wound assessment was Initiated and completed        Waldemar Prevention initiated:  Yes   Wound Care Orders initiated:  No    Pressure Injury (Stage 3,4, Unstageable, DTI, NWPT, and Complex wounds) if present place consult order under [de-identified] No    New and Established Ostomies if present place consult order under : No      Nurse 1 eSignature: Electronically signed by Markel Camacho RN on 2/22/22 at 1:27 AM EST    **SHARE this note so that the co-signing nurse is able to place an eSignature**    Nurse 2 eSignature: Electronically signed by Temi Liu RN on 2/22/22 at 5:23 AM EST

## 2022-02-22 NOTE — PROGRESS NOTES
Maegan Lorenz MD  2022,   July Hernandez    1935  Chief Complaint   Patient presents with    Altered Mental Status     family called squad for new confusion and generalized weakness concerned about stroke. unknown LKW. mobile stroke unit cleared pt for stroke. B. SBP: 150. Principal Problem:    AMS (altered mental status)  Active Problems:    Acute cerebrovascular accident (CVA) ([address]  Resolved Problems:    * No resolved hospital problems. *     has a past medical history of Atrial fibrillation ([address] Diabetes mellitus ([address] Hypertension, and MI (mitral incompetence). Past Surgical History:     has a past surgical history that includes Coronary angioplasty with stent; Pacemaker insertion; Cardiac defibrillator placement; hernia repair; back surgery; hip surgery (Left, 2019); hip pinning (Left, 3/25/2019); joint replacement (Bilateral); Upper gastrointestinal endoscopy (N/A, 2019); Esophagus dilation (N/A, 2019); and Upper gastrointestinal endoscopy (N/A, 2021). ED REVIEW:  Sabina Sanchez is a 81 y.o. male who presents urgency department via EMS. There is no family at present. The patient is confused and not answering questions. Therefore the only report that I am getting from EMS is that the call was for concern that he has a new onset of confusion and that the family thought he may have had a stroke. There is no other information given. EMS does report that a mobile stroke unit cleared the patient for stroke.     1430 the patient son-in-law is at the bedside and reported that the patient is normally oriented. They were sitting down eating lunch today at 1:00 when the patient suddenly could not speak and became unresponsive. EMS apparently administered Narcan because he has a history of chronic back pain which he takes Percocet.   The son reported after the Narcan the patient became arousable.         CURRENT STATUS:    IMAGING-Relevant:FINDINGS:BRAIN/VENTRICLES:  1. Ventricles and sulci: The ventricles and sulci are mildly enlarged but  unchanged. 2. Cerebrum: No hemorrhage, mass or acute infarction. 3. White matter: Mild-moderate low-density is seen within the periventricular  white matter likely from chronic small vessel ischemic change. 4. Brainstem and cerebellum: Normal for age. ORBITS: The visualized portion of the orbits demonstrate no acute abnormality.     Impression---No acute intracranial abnormality. Mild atrophy and white matter disease unchanged    VERTEBRAL ARTERY DISEASE1. No large vessel occlusion or significant stenosis of the intracranial   arteries. 2. Bilateral proximal internal carotid artery stenosis measuring 40% on the   right and 20% on the left. 3. Bilateral vertebral artery origin stenosis, moderate on the right and   severe on the left. MY REVIEW:  81  Male  [employer]  Altered mental status  Encephalopathy   Mobility limited  IMPAIRMENT PT:Assessment: Pt is an 81 y.o. M. admitted 2/21 for CVA R/O. He presents oriented to person and place, but seems somewhat confused regarding situation. He had difficulty following commands for strength/mobility testing, and required use of stedy to use the commode. He would benefit from continued therapy to gradually progress OOB activity as able. At this time, PT recommends low-moderate frequency therapy upon D/C. CODE STROKE   TIA? Extra cranial VERTEBRAL ARTERY occlusive disease: NEUROLOGY: Given stroke risk factors I suspect this was a vascular event in the setting of known Atrial fibrillation with subtherapeutic INR. Low back pain that is chronic. WOULD not add Aspirin to Warfarin. Would restart Warfarin but not double antiplatelet and anticoagulation given high risk for bleeding. Family is aware of the inherent bleeding risk with just Warfarin with his gait issues at baseline and they are aware of his AAA.   MRI if able but may not due to defibrillator  EEG  [facility] with [facility] for his [facility]  Limit narcotic and Benzodiazepine use  See NP for details  Tachycardia? Difficulty communicating? Language and status  ANTICOAGULATION problems INR elevated needs management and stability  ANemia  Hypertension systolic  Hyperglycemia  AGE AND COMPLEXITY!! The Utilization Review Committee members, including its physician members, have reviewed this case and agree that this patient does meet evidence based criteria for inpatient services,  to ADMISSION =\"admit as inpatient\"  Medical care will continue to be provided by Suni Rg MD, and NEUROLOGY who concurs with this decision and has documented his/her concurrence in the patient's medical record. Barak Breen MD, [email or web address], [address] [facility]    Cell [phone]  Office [phone]  2/22/2022  5:21 PM

## 2022-02-22 NOTE — CARE COORDINATION
Case Management Assessment           Initial Evaluation                Date / Time of Evaluation: 2/22/2022 12:58 PM                 Assessment Completed by: Celena Castillo RN     Met with pt at the bedside to complete initial assessment. However, he was quite confused. He stated he lives with his wife but was focused on calling his son to bring in his glasses and cane. Hard of hearing. He is a poor historian. Calls were placed to all the family members listed in the chart. Left a message for his daughter, Marlyn Ruiz. Spoke with his son-in-law, Justin Castro, who was able to provide assistance with assessment. Mr. Gino Chan lives at home with his wife in a condo. He has been relatively independent with his own care. His functional mobility has been impaired by chronic worsening back pain. He spends most of the day in a chair. He is ambulatory with a cane. [facility] sends a nurse to check on him monthly, but there is no other home care in place. The pt's family provides support with transportation, grocery shopping, homemaking, etc.. The discharge plan is yet to be determined. Will address options with the family.        Patient Name: Husam Vallejo     YOB: 1935  Diagnosis: TIA (transient ischemic attack) [G45.9]  AMS (altered mental status) [R41.82]  Acute cerebrovascular accident (CVA) ([facility] [address] [I63.9]     Date / Time: 2/21/2022  1:43 PM    Patient Admission Status: Inpatient    Current PCP: Nathalia Gómez with [facility]  Chart Reviewed: Yes  Patient/ Family Interviewed: Yes    Emergency Contacts:  Extended Emergency Contact Information  Primary Emergency Contact: Madeline Dean  Address: [address] [place] [address] Phone: [phone]  Relation: Spouse  Secondary Emergency Contact: [address] Phone: [phone]  Relation: Child    Advance Directives:   Code Status: Full Code    Financial  Payor: Eileen Casarez / Plan: [employer] GOLD PLUS HMO / Product Type: *No Product type* /     [id number] [address] [facility] [address]  Phone: [phone] Fax: [phone]      Potential assistance Purchasing Medications:    Does Patient want to participate in local refill/ meds to beds program?: Yes    Meds To Beds General Rules:  1. Can ONLY be done Monday- Friday between 8:30am-5pm  2. Prescription(s) must be in pharmacy by 3pm to be filled same day  3. Copy of patient's insurance/ prescription drug card and patient face sheet must be sent along with the prescription(s)  4. Cost of Rx cannot be added to hospital bill. If financial assistance is needed, please contact unit  or ;  or  CANNOT provide pharmacy voucher for patients co-pays  5. Patients can then  the prescription on their way out of the hospital at discharge, or pharmacy can deliver to the bedside if staff is available. (payment due at time of pick-up or delivery - cash, check, or card accepted)     Able to afford home medications/ co-pay costs: Yes    ADLS Independent with a cane  Support Systems:  family    PT AM-PAC:   /24  OT AM-PAC: 11 /24    [place]: artem with spouse    Carmenza Baldwin  Currently ACTIVE with [address]: Has a [employer] nurse visit once monthly    [address]: cane    DISCHARGE PLAN:  Disposition: Home with home care v/s [address] for discharge: to be determined    The Patient and/or patient representative Dayron and his family were provided with a choice of provider and agrees with the discharge plan Yes    Freedom of choice list was provided with basic dialogue that supports the patient's individualized plan of care/goals and shares the quality data associated with the providers.  Yes    Mikey Gomes RN  Case Management  [phone]

## 2022-02-22 NOTE — PROGRESS NOTES
[facility]  Personalized Stroke Treatment Plan  My Stroke Type:   [] Ischemic Stroke (Blockage of blood flow to the brain)     [] TIA  Transient Ischemic Attack (mini-stroke)    Personal risk factors you can control include:    [] Alcohol Abuse: check with your physician before any alcohol consumption. [x] Atrial fibrillation: may cause blood clots. [] Drug Abuse: Seek help, talk with your doctor  [] Clotting Disorder  [x] Diabetes  [] Family history of stroke or heart disease  [x] High Blood Pressure/Hypertension: work with your physician.  [] High cholesterol: monitor cholesterol levels with your physician.   [] Overweight/Obesity: work with your physician for your ideal body weight.  [] Physical Inactivity: get regular exercise as directed by your physician. [] Personal history of previous TIA or stroke  [] Poor Diet; decrease salt (sodium) in your diet, follow diet directed by physician. [] Smoking: Cigarette/Cigar: stop smoking. Follow up with your physician is important after discharge. TAKE all medications as prescribed. Do not stop taking any medications   without talking to your physician. BE FAST is a simple way to remember the main symptoms of stroke. Recognizing these symptoms helps you know when to call for medical help. BE FAST stands for:                                                 B Balance problems                                                 E Eyes, vision lost        F  Face Drooping      A  Arm Weakness        S  Speech Difficulty      T  Time to Call 9-1-1  DO NOT DELAY THIS! Educated patient and/or family on personal risk factors for stroke/TIA. Included ways to reduce the risk for recurrent stroke. [facility]'s Stroke treatment and prevention, Managing your recovery  notebook  provided to patient. The notebook includes, but not limited to, sections addressing warning signs & symptoms of a stroke.  The need to call EMS ([phone]) immediately if signs & symptoms occur is emphasized . Medication information will be reviewed at discharge. The notebook also provides education on Stroke community resources and stroke advocacy.     Electronically signed by Electronically signed by Princess Farzana RN on 2/22/2022 at 5:53 PM

## 2022-02-22 NOTE — PROGRESS NOTES
Occupational Therapy   Occupational Therapy Initial Assessment/Treatment   Date: 2022   Patient Name: Maryann Wilson  MRN: [id number]     : 1935    Date of Service: 2022    Discharge Recommendations:  3-5 sessions per week      Maryann Wilson scored a  on the AM-PAC ADL Inpatient form. Current research shows that an AM-PAC score of 17 or less is typically not associated with a discharge to the patient's home setting. Based on the patient's AM-PAC score and their current ADL deficits, it is recommended that the patient have 3-5 sessions per week of Occupational Therapy at d/c to increase the patient's independence. Please see assessment section for further patient specific details. If patient discharges prior to next session this note will serve as a discharge summary. Please see below for the latest assessment towards goals. Assessment   Performance deficits / Impairments: Decreased functional mobility ; Decreased strength;Decreased endurance;Decreased ADL status; Decreased cognition;Decreased balance  Assessment: Pt is an 81yr old male admitted for CVA r/o. Pt is a poor historian, but reports baseline independence with ADLs using RW. Pt currently limited by back pain when transitioning to edge of bed. Pt required min A for sit<>stand and only able to tolerate 3-4 steps towards HOB. Anticipate pt will require ongoing OT in order to increase functional status prior to returning home. Prognosis: Fair  Decision Making: Medium Complexity  OT Education: OT Role;ADL Adaptive Strategies; Plan of Care;Transfer Training;Precautions  Barriers to Learning: cog; language  REQUIRES OT FOLLOW UP: Yes  Activity Tolerance  Activity Tolerance: Patient limited by pain  Activity Tolerance: Pt reporting 8/10 back pain upon sitting up; Pt stating he did fall at home, but did not recall when; RN notified of pain  Safety Devices  Safety Devices in place: Yes  Type of devices:  All fall risk precautions in place; Left in bed;Bed alarm in place;Call light within reach;Nurse notified; Patient at risk for falls           Patient Diagnosis(es): The encounter diagnosis was TIA (transient ischemic attack). has a past medical history of Atrial fibrillation ([facility] [address] Diabetes mellitus ([facility] [address] Hypertension, and MI (mitral incompetence). has a past surgical history that includes Coronary angioplasty with stent; Pacemaker insertion; Cardiac defibrillator placement; hernia repair; back surgery; hip surgery (Left, 03/25/2019); hip pinning (Left, 3/25/2019); joint replacement (Bilateral); Upper gastrointestinal endoscopy (N/A, 8/8/2019); Esophagus dilation (N/A, 8/8/2019); and Upper gastrointestinal endoscopy (N/A, 8/25/2021). Restrictions  Restrictions/Precautions  Restrictions/Precautions: Fall Risk  Implants present? : Pacemaker    Subjective   General  Chart Reviewed: Yes,Orders,Progress Notes,History and Physical,Imaging  Patient assessed for rehabilitation services?: Yes  Family / Caregiver Present: No  Referring Practitioner: Evangelina Canada MD  Diagnosis: AMS; TIA  Subjective  Subjective: Pt in bed, agreeable to OT evaluation. Pt speaking english and Icelandic in sentences.   Patient Currently in Pain: Yes  Pain Assessment  Pain Assessment: 0-10  Pain Level: 8  Pain Location: Back  Pre Treatment Pain Screening  Intervention List: Patient able to continue with treatment;Nurse/Physician notified  Vital Signs  Patient Currently in Pain: Yes  Social/Functional History  Social/Functional History  Lives With: Spouse  Type of Home:  (Condo)  Home Layout: One level  Home Access: Stairs to enter with rails  Entrance Stairs - Number of Steps: 7+7  Entrance Stairs - Rails: Left  Bathroom Shower/Tub: Walk-in shower,Shower chair with back  Bathroom Toilet: Standard  Bathroom Accessibility: Walker accessible  Home Equipment: Rolling walker  ADL Assistance: Independent  Homemaking Assistance: Needs assistance (wife assist with all IADLs)  Homemaking Responsibilities: No  Ambulation Assistance: Independent (with RW)  Transfer Assistance: Independent       Objective   Vision: Impaired  Vision Exceptions: Wears glasses at all times  Hearing: Exceptions to [facility]  Hearing Exceptions: Hard of hearing/hearing concerns    Orientation  Overall Orientation Status: Impaired  Orientation Level: Disoriented to time;Oriented to place;Oriented to person;Disoriented to situation     Balance  Sitting Balance: Stand by assistance  Standing Balance: Minimal assistance  Functional Mobility  Functional Mobility Comments: declined further mobility due to back pain; able to take 3-4 steps towards HOB with RW- min A  ADL  LE Dressing: Dependent/Total  Toileting: Dependent/Total  Tone RUE  RUE Tone: Normotonic  Tone LUE  LUE Tone: Normotonic  Coordination  Movements Are Fluid And Coordinated: Yes     Bed mobility  Supine to Sit: Minimal assistance  Sit to Supine: Moderate assistance  Scooting: Dependent/Total;Maximal assistance;2 Person assistance (scoot up in bed)  Transfers  Sit to stand: Minimal assistance  Stand to sit: Minimal assistance  Transfer Comments: up to RW     Cognition  Overall Cognitive Status: Exceptions  Arousal/Alertness: Appropriate responses to stimuli;Delayed responses to stimuli  Following Commands: Follows one step commands with increased time; Follows one step commands with repetition  Memory: Decreased recall of recent events  Insights: Decreased awareness of deficits  Initiation: Requires cues for some  Cognition Comment: pt noted to be speaking english and Maltese in the same sentence                 LUE AROM (degrees)  LUE AROM : WFL  Left Hand AROM (degrees)  Left Hand AROM: WFL  RUE AROM (degrees)  RUE AROM : WFL  Right Hand AROM (degrees)  Right Hand AROM: WFL                      Plan   Plan  Times per week: 3-5  Current Treatment Recommendations: Strengthening,Functional Mobility Training,Patient/Caregiver Education & Training,Endurance Training,Balance Training,Safety Education & Training,Self-Care / Annetta Loveless    AM-PAC Score        AM-PAC Inpatient Daily Activity Raw Score: 11 (02/22/22 0934)  AM-PAC Inpatient ADL T-Scale Score : 29.04 (02/22/22 0934)  ADL Inpatient CMS 0-100% Score: 70.42 (02/22/22 0934)  ADL Inpatient CMS G-Code Modifier : CL (02/22/22 0934)    Goals  Short term goals  Time Frame for Short term goals: By d/c  Short term goal 1: Pt will complete toileting with SBA  Short term goal 2: Pt will tolerate bathroom mobility with CGA using RW  Short term goal 3: Pt will complete functional transfers with CGA  Short term goal 4: Pt will tolerate up to 2 mins of standing at sink for ADLs  Patient Goals   Patient goals :  To go home       Therapy Time   Individual Concurrent Group Co-treatment   Time In 0835         Time Out 0925         Minutes 50         Timed Code Treatment Minutes: [address], OTR/L

## 2022-02-22 NOTE — CONSULTS
Neurology Consult Note  Reason for Consult: CVA    Chief complaint: back pain    Dr Constance Paz MD asked me to see Hannah Malloy in consultation for evaluation of CVA    History of Present Illness:  Hannah Malloy is a 81 y.o. male who presents with altered mental status. I obtained my information via interview w/ the patient, supplemented by chart review. I wasn't sure what number to call to try and get some additional information. I tried calling his daughter via the number listed in the chart though it rang to voicemail. The patient tells me that he doesn't usually drink wine. He tells me that he had a bit of wine yesterday and seems to think he had some sort of response to the alcohol in combination w/ his chronic pain medication. He really doesn't have good recollection of what exactly occurred. The patient's son in law was available in the ED and told staff that around 1300 they were sitting down eating lunch and the patient abruptly became altered and wasn't talking. It was documented that he was essentially unresponsive. No convulsive behavior, tongue biting, or bowel/bladder incontinence were reported. EMS was called. Narcan was administered and he reportedly w/ some response as he started to come to. He was subsequently transported to the ED to be evaluated. Initial BP was 168/89. CT head was w/out any acute findings. CTA head/neck negative. Initially he was awake but not talking much or following directions. He was basically just picking up his blanket and moving around the bed. Re-evaluation in the ED some time later revealed that the patient was lucid and seemed to be back to his baseline. Currently he is resting in bed. He is actually complaining of some acute lower back (over past 3 days). He does have chronic lumbar spine disease and pain. He denies any trauma. The patient was evaluated by Neurology in 2017 for a similar sounding event.   Apparently at that time he was at a golf outing and suddenly had trouble getting his words out and could not speak for 3 minutes. No acute stroke at the time. He is on warfarin for atrial fibrillation.       Medical History:  Past Medical History:   Diagnosis Date    Atrial fibrillation ([facility] [address]     Diabetes mellitus ([facility] [address]     unsure if diabetic per patient    Hypertension     MI (mitral incompetence)      Past Surgical History:   Procedure Laterality Date    BACK SURGERY      CARDIAC DEFIBRILLATOR PLACEMENT      CORONARY ANGIOPLASTY WITH STENT PLACEMENT      ESOPHAGEAL DILATATION N/A 8/8/2019    ESOPHAGEAL DILATION MAURO performed by Marilyn Perry MD at [address] Left 3/25/2019    CLOSED REDUCTION PERCUTANEOUS LEFT HIP PINNING performed by Tong Carl MD at [address] Left 03/25/2019    CLOSED REDUCTION PERCUTANEOUS LEFT HIP PINNING POSSIBLE LEFT HIP HEMIARTHROPLASTY    JOINT REPLACEMENT Bilateral     knees    PACEMAKER INSERTION      UPPER GASTROINTESTINAL ENDOSCOPY N/A 8/8/2019    EGD BIOPSY performed by Marilyn Perry MD at [address] N/A 8/25/2021    ESOPHAGOGASTRODUODENOSCOPY performed by Ernesto Coombs MD at [address]:   sodium chloride flush  10 mL IntraVENous 2 times per day    aspirin  81 mg Oral Daily    Or    aspirin  300 mg Rectal Daily    [Held by provider] atorvastatin  40 mg Oral Nightly    [Held by provider] carvedilol  12.5 mg Oral BID    [Held by provider] donepezil  10 mg Oral Nightly    [Held by provider] ferrous sulfate  325 mg Oral Daily    [Held by provider] furosemide  40 mg Oral BID    [Held by provider] losartan  25 mg Oral Daily    pantoprazole  40 mg Oral QAM AC    tamsulosin  0.4 mg Oral Daily    insulin lispro  0-6 Units SubCUTAneous 6 times per day     Medications Prior to Admission:   oxyCODONE-acetaminophen (PERCOCET) 5-325 MG per tablet, Take 1 tablet by mouth 3 times daily as needed for Pain. donepezil (ARICEPT) 10 MG tablet, Take 10 mg by mouth nightly  ALPRAZolam (XANAX) 0.25 MG tablet, Take 0.75 mg by mouth nightly as needed for Sleep.  nabumetone (RELAFEN) 750 MG tablet, Take 750 mg by mouth 2 times daily as needed for Pain   warfarin (COUMADIN) 2.5 MG tablet, Take 5-7.5 mg by mouth every evening 5 mg daily EXCEPT and 7.5 mg FRI/SAT  docusate sodium (COLACE, DULCOLAX) 100 MG CAPS, Take 100 mg by mouth 2 times daily  losartan (COZAAR) 25 MG tablet, Take 25 mg by mouth daily  atorvastatin (LIPITOR) 20 MG tablet, Take 1 tablet by mouth daily  tamsulosin (FLOMAX) 0.4 MG capsule, Take 0.4 mg by mouth daily  levocetirizine (XYZAL) 5 MG tablet, Take 5 mg by mouth daily  omeprazole (PRILOSEC) 20 MG delayed release capsule, Take 40 mg by mouth daily   carvedilol (COREG) 12.5 MG tablet, Take 1 tablet by mouth 2 times daily  ferrous sulfate 325 (65 FE) MG tablet, Take 325 mg by mouth daily  furosemide (LASIX) 40 MG tablet, Take 1 tablet by mouth 2 times daily     Allergies   Allergen Reactions    Ambien [Zolpidem]      Rash      Labetalol Itching and Rash     Family History   Problem Relation Age of Onset    Stroke Mother      Social History     Tobacco Use   Smoking Status Never Smoker   Smokeless Tobacco Never Used     Social History     Substance and Sexual Activity   Drug Use Never     Social History     Substance and Sexual Activity   Alcohol Use No     ROS  Constitutional- No weight loss or fevers  Eyes- No diplopia. No photophobia. Ears/nose/throat- No dysphagia. No Dysarthria  Cardiovascular- No palpitations. No chest pain  Respiratory- No dyspnea. No Cough  Gastrointestinal- No Abdominal pain. No Vomiting. Genitourinary- No incontinence. No urinary retention  Musculoskeletal- No myalgia. + arthralgia  Skin- No rash. No easy bruising. Psychiatric- No depression. No anxiety  Endocrine- No diabetes. No thyroid issues.   Hematologic- No bleeding difficulty. No fatigue  Neurologic- No weakness. No Headache. Exam  Blood pressure 124/78, pulse 87, temperature 98.2 °F (36.8 °C), temperature source Oral, resp. rate 16, height 5' 7\" (1.702 m), weight 138 lb 0.1 oz (62.6 kg), SpO2 97 %. Constitutional    Vital signs: BP, HR, and RR reviewed   General alert, no distress  Eyes: unable to visualize the fundi  Cardiovascular: no peripheral edema. Psychiatric: cooperative with examination, no psychotic behavior noted. Neurologic  Mental status:   orientation to person, place, time. General fund of knowledge grossly intact   Memory grossly intact   Attention intact as able to attend well to the exam     Language fluent in conversation   Comprehension intact; follows simple commands  Cranial nerves:   CN2: visual fields full   CN 3,4,6: extraocular muscles intact  CN5: facial sensation symmetric   CN7: face symmetric without dysarthria  CN8: hearing grossly intact  CN9: palate elevated symmetrically  CN11: trap full strength on shoulder shrug  CN12: tongue midline with protrusion  Strength: good strength in all 4 extremities   Sensory: light touch intact in all 4 extremities. Cerebellar/coordination: finger nose finger normal without ataxia  Tone: normal in all 4 extremities  Gait: deferred at this time for safety. Labs  Glucose 95  Na 129  K 3.8  BUN 17  Cr 1.0    BNP 5043  Troponin 0.05    ALT 17  AST 24  Ammonia < 10    WBC 4.2K  Hg 11.4  Platelets 960  INR 8.00    Cholesterol, Total 106   HDL Cholesterol 54   LDL Calculated 42   Triglycerides 50   VLDL Cholesterol Calculated 10     UA negative    Studies  CT head w/o 2/21/22, independently reviewed  No acute intracranial abnormality. Mild atrophy and white matter disease unchanged. CTA head/neck 2/21/22, independently reviewed  1. No large vessel occlusion or significant stenosis of the intracranial arteries   2.  Bilateral proximal internal carotid artery stenosis measuring 40% on the right and 20% on the left. 3. Bilateral vertebral artery origin stenosis, moderate on the right and   severe on the left. Impression  1. Transient episode of altered mental status/unresponsiveness. The etiology is not entirely clear. Differential could include a neurovascular event, seizures, or toxic/metabolic. It looks like he may have had a similar 2-3 minute episode in 2017.    2.  Atrial fibrillation. INR subtherapeutic. 3.  Hyponatremia. 4.  Acute on chronic lumbar back pain. 5.  Hypertension. Recommendations  1. MRI brain w/o.    2.  EEG. 3.  No objection to resuming warfarin. I don't think he needs asa added at this point. 4.  Would consider lumbar spine imaging given reported acute back pain.     5.  Resumption of home medications per hospitalist.      Yessy Cerrato NP  [address] [place] [address] [address] Neurology    A copy of this note was provided for Dr Radha Sims MD

## 2022-02-23 LAB
EKG ATRIAL RATE: 156 BPM
EKG DIAGNOSIS: NORMAL
EKG Q-T INTERVAL: 310 MS
EKG QRS DURATION: 110 MS
EKG QTC CALCULATION (BAZETT): 443 MS
EKG R AXIS: -22 DEGREES
EKG T AXIS: -47 DEGREES
EKG VENTRICULAR RATE: 123 BPM
GLUCOSE BLD-MCNC: 109 MG/DL (ref 70–99)
GLUCOSE BLD-MCNC: 125 MG/DL (ref 70–99)
GLUCOSE BLD-MCNC: 187 MG/DL (ref 70–99)
GLUCOSE BLD-MCNC: 218 MG/DL (ref 70–99)
INR BLD: 1.24 (ref 0.88–1.12)
PERFORMED ON: ABNORMAL
PROTHROMBIN TIME: 14.1 SEC (ref 9.9–12.7)

## 2022-02-23 PROCEDURE — 2580000003 HC RX 258: Performed by: INTERNAL MEDICINE

## 2022-02-23 PROCEDURE — 2500000003 HC RX 250 WO HCPCS: Performed by: NURSE PRACTITIONER

## 2022-02-23 PROCEDURE — 97530 THERAPEUTIC ACTIVITIES: CPT

## 2022-02-23 PROCEDURE — 93010 ELECTROCARDIOGRAM REPORT: CPT | Performed by: INTERNAL MEDICINE

## 2022-02-23 PROCEDURE — 6370000000 HC RX 637 (ALT 250 FOR IP): Performed by: STUDENT IN AN ORGANIZED HEALTH CARE EDUCATION/TRAINING PROGRAM

## 2022-02-23 PROCEDURE — 6370000000 HC RX 637 (ALT 250 FOR IP): Performed by: INTERNAL MEDICINE

## 2022-02-23 PROCEDURE — 97129 THER IVNTJ 1ST 15 MIN: CPT

## 2022-02-23 PROCEDURE — 6370000000 HC RX 637 (ALT 250 FOR IP): Performed by: FAMILY MEDICINE

## 2022-02-23 PROCEDURE — 85610 PROTHROMBIN TIME: CPT

## 2022-02-23 PROCEDURE — 2060000000 HC ICU INTERMEDIATE R&B

## 2022-02-23 PROCEDURE — 93005 ELECTROCARDIOGRAM TRACING: CPT | Performed by: FAMILY MEDICINE

## 2022-02-23 PROCEDURE — 97110 THERAPEUTIC EXERCISES: CPT

## 2022-02-23 PROCEDURE — 92507 TX SP LANG VOICE COMM INDIV: CPT

## 2022-02-23 PROCEDURE — 36415 COLL VENOUS BLD VENIPUNCTURE: CPT

## 2022-02-23 RX ORDER — OLANZAPINE 10 MG/1
10 INJECTION, POWDER, LYOPHILIZED, FOR SOLUTION INTRAMUSCULAR ONCE
Status: DISCONTINUED | OUTPATIENT
Start: 2022-02-24 | End: 2022-02-26 | Stop reason: HOSPADM

## 2022-02-23 RX ORDER — METOPROLOL TARTRATE 5 MG/5ML
5 INJECTION INTRAVENOUS EVERY 6 HOURS PRN
Status: DISCONTINUED | OUTPATIENT
Start: 2022-02-23 | End: 2022-02-26 | Stop reason: HOSPADM

## 2022-02-23 RX ORDER — WARFARIN SODIUM 7.5 MG/1
7.5 TABLET ORAL
Status: COMPLETED | OUTPATIENT
Start: 2022-02-23 | End: 2022-02-23

## 2022-02-23 RX ADMIN — WARFARIN SODIUM 7.5 MG: 7.5 TABLET ORAL at 18:11

## 2022-02-23 RX ADMIN — INSULIN LISPRO 1 UNITS: 100 INJECTION, SOLUTION INTRAVENOUS; SUBCUTANEOUS at 20:33

## 2022-02-23 RX ADMIN — PANTOPRAZOLE SODIUM 40 MG: 40 TABLET, DELAYED RELEASE ORAL at 06:42

## 2022-02-23 RX ADMIN — SODIUM CHLORIDE, PRESERVATIVE FREE 10 ML: 5 INJECTION INTRAVENOUS at 08:58

## 2022-02-23 RX ADMIN — METOPROLOL TARTRATE 5 MG: 5 INJECTION INTRAVENOUS at 08:57

## 2022-02-23 RX ADMIN — TAMSULOSIN HYDROCHLORIDE 0.4 MG: 0.4 CAPSULE ORAL at 09:07

## 2022-02-23 RX ADMIN — SODIUM CHLORIDE, PRESERVATIVE FREE 10 ML: 5 INJECTION INTRAVENOUS at 21:00

## 2022-02-23 ASSESSMENT — PAIN DESCRIPTION - PROGRESSION: CLINICAL_PROGRESSION: NOT CHANGED

## 2022-02-23 NOTE — CARE COORDINATION
Tried to meet with patient, patient confused. Called wife but was unable to understand me over the telephone. Call placed to daughter Ale Fraire, had to leave message requesting return call. PT/OT recs skilled nursing facility, await return call from family.   Electronically signed by Apolinar Huerta RN Case Management [phone] on 2/23/2022 at 11:41 AM

## 2022-02-23 NOTE — PROGRESS NOTES
Speech Language/Pathology   SPEECH LANGUAGE AND CLINICAL BEDSIDE SWALLOWING TREATMENT  Speech Therapy Department       Dayron Dean  AGE: 81 y.o. GENDER: male  : 1935  [id number]  EPISODE DATE:  2022    MEDICAL DIAGNOSIS: AMS    Chief Complaint   Patient presents with    Altered Mental Status     family called squad for new confusion and generalized weakness concerned about stroke. unknown LKW. mobile stroke unit cleared pt for stroke. B. SBP: 150.        ONSET: 2022       PAST MEDICAL HISTORY        Diagnosis Date    Atrial fibrillation ([address]     Diabetes mellitus ([address]     unsure if diabetic per patient    Hypertension     MI (mitral incompetence)        PAST SURGICAL HISTORY    Past Surgical History:   Procedure Laterality Date    BACK SURGERY      CARDIAC DEFIBRILLATOR PLACEMENT      ICD not MRI conditional.    CORONARY ANGIOPLASTY WITH STENT PLACEMENT      ESOPHAGEAL DILATATION N/A 2019    ESOPHAGEAL DILATION Kenneth Peppers performed by Jarvis Bates MD at [address] [facility] Left 2019    CLOSED REDUCTION PERCUTANEOUS LEFT HIP PINNING performed by Julia Shelley MD at [address] Left 2019    CLOSED REDUCTION PERCUTANEOUS LEFT HIP PINNING POSSIBLE LEFT HIP HEMIARTHROPLASTY    JOINT REPLACEMENT Bilateral     knees    PACEMAKER INSERTION      UPPER GASTROINTESTINAL ENDOSCOPY N/A 2019    EGD BIOPSY performed by Jarvis Bates MD at [facility] N/A 2021    ESOPHAGOGASTRODUODENOSCOPY performed by Jenaro Dia MD at [address] [place] [address]    Allergies   Allergen Reactions    Ambien [Zolpidem]      Rash      Labetalol Itching and Rash     CHART REVIEW: Patient admitted 22 with C/O AMS H&P Patient is a 69-year-old male with past medical history of atrial fibrillation diabetes mellitus hypertension who presents to the hospital for a brief episode of dysarthria generalized weakness while having lunch.  According to the family at bedside patient was having lunch and all of a sudden he stopped talking, felt weak and was not able to hold conversations and he stopped eating. Landon Balderas was brought to the emergency department due to concern of a stroke.  Patient mentions he still feels weak however according to family he is a speaking and orientation has improved.  No reports of fevers chills belly pain chest pain.     CHEST X-RAY 2/21/22  Impression   No radiographic evidence of acute pulmonary disease.      CT HEAD 2/21/22  Impression   No acute intracranial abnormality.       Mild atrophy and white matter disease unchanged      MRI brain ordered 2/21/22      Prior Status: patient unable to provide history    Subjective:     Current diet: Soft/Bite-size;  Thin  Pt/staff statements regarding diet tolerance: adequate tolerance reported    Cognitive-communication treatment progress: Continued confusion and agitation reported and charted    Objective: Assessment/Therapy activities and impression of progress/goal status   Treatment this session  Objective:  COMPREHENSION  Auditory Comprehension: []WFL []Mild   [] Moderate  [x]Severe  []To be assessed  Impaired Yes/no questions: max cues  Impaired Basic questions: max cues  Impaired One step commands: mod-max cues    EXPRESSION  Verbal Expression: []WFL []Mild   [x] Moderate  [x]Severe  []To be assessed  Impaired Automatic Speech: min cues  Impaired Confrontational Naming: min-mod cues  Impaired Responsive Naming: max cues  Patient with tendency to code switch [place] citizen/English; receptive to max cueing for [place]     Pragmatics/Social Functioning: [x]WFL []Mild   [] Moderate  []Severe  []To be assessed      MOTOR SPEECH  Motor Speech: [x]WFL []Mild   [] Moderate  []Severe  []To be assessed    COGNITION  [x]Unable to be assessed secondary to Aphasia     Overall Orientation : []WFL []Mild   [] Moderate  [x]Severe  []To be assessed   []Unable to be assessed secondary to Aphasia   Disoriented to time   Disoriented to situation   Disoriented to place   Oriented to self  Comment: oriented to ; age    Attention: []WFL []Mild   [] Moderate  [x]Severe  []To be assessed  Mod cues for sustained attention to task    Memory: []WFL []Mild   [] Moderate  [x]Severe  []To be assessed  Impaired Daily Routines  Comment: max cues for recall for daily events/activities    Problem Solving: []WFL []Mild   [] Moderate  []Severe  [x]To be assessed  Comment: Unable to be assessed secondary to Aphasia    Safety/Judgement: []WFL []Mild   [] Moderate  []Severe  [x]To be assessed  Comment: Unable to be assessed secondary to Aphasia    Goal Status: Speech and Language Goals  Goal 1: The patient will participate in ongoing assessment with modification of goals as needed continue  Goal 2: The patient will communicate basic needs/wants utilizing any modality with 85% accuracy given min verbal cues continue  Goal 3: The patient will follow 1-step directions with 85% accuracy given min verbal cues continue  Goal 4: The patient will complete word finding tasks with 85% accuracy given min verbal cues continue      DYSPHAGIA  Positioning: Upright in chair   PO Trials:  · Thin Liquids: suspect premature bolus loss to the pharynx; delayed swallow; decreased laryngeal elevation; NO overt signs/symptoms of penetration/aspiration  · Nectar thick liquids: DNT  · Honey Thick liquids: DNT  · Puree: DNT/declines   · Soft food: DNT/declines  · Regular food: DNT/declines  Dysphagia Tx:   · Direct Dysphagia tx: PO trials  · Dysphagia ex: NA  · Training in compensatory strategies: reinforced  · Pt response to ex/training: good, but limited insight is a barrier    Status of Dysphagia Goals:    The patient will tolerate recommended diet without observed clinical signs of aspiration, continue  The patient will tolerate mechanical soft foods 10/10., not addressed  The patient/caregiver will demonstrate understanding of compensatory strategies for improved swallowing safety. , continue  The patient will tolerate thin liquids without signs and symptoms of aspiration 10/10 via cup. continue      IMPRESSIONS  Cognitive Diagnosis: Unable to assess d/t severity of language impairments; appears to present with limited recall for orientation and [place] information  Communication Diagnosis: Moderate-severe receptive and expressive aphasia. Patient requires max cues for comprehension for simple basic functional information and mod cues for word-finding for self-expression for daily need. Dysphagia Diagnosis per 2/22/2022: Mild oral dysphagia characterized by slow disorganized formation of bolus with concern for excessive labor with regular textures. Suspect reduced lingual coordination. Mild pharyngeal dysphagia characterized by delayed swallow initiation and decreased laryngeal elevation. No overt s/s of aspiration or penetration across all consistencies. Recommended Diet:  Diet Solids Recommendation: Dysphagia Soft and Bite-Sized (Dysphagia III)  Liquid Consistency Recommendation:  Thin  Compensatory Swallowing Strategies: Remain upright for 30-45 minutes after meals,Upright as possible for all oral intake,Small bites/sips      Plan     Requires SLP Intervention: Yes  Therapeutic Interventions: Diet tolerance monitoring,Patient/Family education,Therapeutic PO trials with SLP, Communication and cognitive-communication remediation  Duration/Frequency of Treatment: 3-5x/week during acute admission    Barriers toward progress toward pt goals:  Confusion, Cognitive deficit, Limited insight into deficits and Communication deficit    Prognosis  Prognosis for safe diet advancement: good  Barriers to reach goals: cognitive deficits    Education  Consulted and agree with results and recommendations: Patient,RN,MD  Patient Education: Patient educated on purpose of ST services/goals/recommendations  Patient Education Response: No evidence of learning      Discharge Recommendations:  Pt will benefit from continued skilled Speech Therapy for Speech and Dysphagia services, prior to returning home. Please accept this as Speech Therapy Discharge status, if pt is discharged prior to next therapy session. Timed Code Treatment: 10 minutes  Total Treatment Time: 30 minutes (10 cog, 15 SLP, 5 dysphagia     Signed:  Betzaida Boyer, [address] [facility], [id number]  Speech-Language Pathologist  Portable phone: [phone]  02/23/22 4:05 PM

## 2022-02-23 NOTE — PROGRESS NOTES
Diabetes mellitus ([facility] Hypertension, and MI (mitral incompetence). has a past surgical history that includes Coronary angioplasty with stent; Pacemaker insertion; Cardiac defibrillator placement; hernia repair; back surgery; hip surgery (Left, 03/25/2019); hip pinning (Left, 3/25/2019); joint replacement (Bilateral); Upper gastrointestinal endoscopy (N/A, 8/8/2019); Esophagus dilation (N/A, 8/8/2019); and Upper gastrointestinal endoscopy (N/A, 8/25/2021). Restrictions  Restrictions/Precautions  Restrictions/Precautions: Fall Risk  Implants present? : Pacemaker     Subjective : Pt pleasantly confused. Agreeable to activity. Orientation  Orientation  Overall Orientation Status: Impaired    Objective      Bed mobility  Supine to Sit: Moderate assistance     Transfers  Sit to Stand: Minimal Assistance;Contact guard assistance  Stand to sit: Minimal Assistance;Contact guard assistance     Ambulation  Ambulation?: Yes  More Ambulation?: Yes  Ambulation 1  Surface: level tile  Device: Single point cane  Assistance: Minimal assistance  Quality of Gait: Kyphotic posture, short, shuffling steps, generally unstable. Distance: 3 steps forward/backward    Ambulation 2  Surface - 2: level tile  Device 2: Rolling Walker  Assistance 2: Contact guard assistance;Stand by assistance  Quality of Gait 2: Improved stability, improved speed, kyphotic posture. Distance: Several steps forward/backward     Other Activities: Other (see comment)  Comment: Pt initially transferred from bed to chair using stedy. He was able to stand with cues, CGA (decreased command following, but functional strength). After ambulation trials, pt positioned for comfort in bedside chair.     AM-PAC Score  AM-PAC Inpatient Mobility Raw Score : 15 (02/23/22 0844)  AM-PAC Inpatient T-Scale Score : 39.45 (02/23/22 0844)  Mobility Inpatient CMS 0-100% Score: 57.7 (02/23/22 0844)  Mobility Inpatient CMS G-Code Modifier : CK (02/23/22 0844) Goals  Short term goals  Time Frame for Short term goals: In 2-3 days pt will perform  Short term goal 1: Bed mobility SBA  Short term goal 2: Transfers Min A  Short term goal 3: Ambulation 8' with walker, Min A  Patient Goals   Patient goals : Unable to state    Plan    Plan  Times per week: 2-3x  Specific instructions for Next Treatment: Improve balance; practice transfers and ambulation  Current Treatment Recommendations: Balance Training,Endurance Training,Functional Mobility Training,Transfer Training,Gait Training,Stair training,Neuromuscular Re-education,Home Exercise Program,Patient/Caregiver Education & Training,Safety Education & Training,Equipment Evaluation, Education, & procurement,Positioning,ADL/Self-care Training,Cognitive/Perceptual Training  Safety Devices  Type of devices:  All fall risk precautions in place,Call light within reach,Gait belt,Nurse notified,Chair alarm in place,Left in chair  Restraints  Initially in place: No     Therapy Time   Individual Concurrent Group Co-treatment   Time In 0818         Time Out 0842         Minutes 24         Timed Code Treatment Minutes: 24 Minutes       Messi Perales, PT    Electronically signed by Messi Perales, PT [id number] on 2/23/2022 at 8:50 AM

## 2022-02-23 NOTE — PROGRESS NOTES
Patient refusing his accucheck, his Flomax, and NIH assessment. He is agitated, [facility]. Son-in-law called and patient hung up on him after pulling cord away from this RN.

## 2022-02-23 NOTE — CONSULTS
Clinical Pharmacy Note  Warfarin Consult    Mariah Montero is a 81 y.o. male receiving warfarin managed by pharmacy. Patient being bridged with none. Warfarin Indication: afib  Target INR range: 2-3   Dose prior to admission: 5 mg daily except 7.5 mg on Saturdays and Sundays    Current warfarin drug-drug interactions:     Recent Labs     02/21/22  1402 02/22/22  0458 02/22/22  1846 02/23/22  0507   HGB 11.8* 11.4*  --   --    HCT 34.7* 33.3*  --   --    INR 1.51*  --  1.30* 1.24*       Assessment/Plan:    Warfarin 7.5mg tonight. Daily PT/INR until stable within therapeutic range. Thank you for the consult. Will continue to follow.   Michael Kasper, [facility]

## 2022-02-23 NOTE — PROGRESS NOTES
EEG on hold to give the patient time to calm down and hopefully become cooperative. They will call back later this afternoon.

## 2022-02-23 NOTE — ADT AUTH CERT
Utilization Reviews          PA PA RECOMMENDATION by Beatriz Dang RN       Review Status Review Entered   In Primary 2022 08:40      Criteria Review   Signed                 Show:Clear all  [x]? Manual[x]? Template[x]? Copied     Added by:  [x]? Mariposa Winkler MD        []? Saige for details     UTILIZATION REVIEW      Nohemi GALINDO  2022,   Cedrick THAO     1935          Chief Complaint   Patient presents with    Altered Mental Status       family called [facility] for new confusion and generalized weakness concerned about stroke. unknown LKW. mobile stroke unit cleared pt for stroke. B. SBP: 150.          Principal Problem:    AMS (altered mental status)  Active Problems:    Acute cerebrovascular accident (CVA) ([address]  Resolved Problems:    * No resolved hospital problems. *      has a past medical history of Atrial fibrillation ([address] Diabetes mellitus ([address] Hypertension, and MI (mitral incompetence).     Past Surgical History:      has a past surgical history that includes Coronary angioplasty with stent; Pacemaker insertion; Cardiac defibrillator placement; hernia repair; back surgery; hip surgery (Left, 2019); hip pinning (Left, 3/25/2019); joint replacement (Bilateral); Upper gastrointestinal endoscopy (N/A, 2019); Esophagus dilation (N/A, 2019); and Upper gastrointestinal endoscopy (N/A, 2021).       ED REVIEW:  Duke Dean is a 81 y. o. male who presents urgency department via EMS.  Crystal Marcus is no family at present.  The patient is confused and not answering questions.  Therefore the only report that I am getting from EMS is that the call was for concern that he has a new onset of confusion and that the family thought he may have had a stroke. Crystal Marcus is no other information given.  EMS does report that a mobile stroke unit cleared the patient for stroke.     1430 the patient son-in-law is at the bedside and reported that the patient is normally oriented. Stephanie Lozano were sitting down eating lunch today at 1:00 when the patient suddenly could not speak and became unresponsive.  EMS apparently administered Narcan because he has a history of chronic back pain which he takes [address] son reported after the Narcan the patient became arousable.           CURRENT STATUS:     IMAGING-Relevant:FINDINGS:BRAIN/VENTRICLES:  1. Ventricles and sulci: The ventricles and sulci are mildly enlarged but  unchanged. 2. Cerebrum: No hemorrhage, mass or acute infarction. 3. White matter: Mild-moderate low-density is seen within the periventricular  white matter likely from chronic small vessel ischemic change. 4. Brainstem and cerebellum: Normal for age. ORBITS: The visualized portion of the orbits demonstrate no acute abnormality.     Impression---No acute intracranial abnormality. Mild atrophy and white matter disease unchanged     VERTEBRAL ARTERY DISEASE1. No large vessel occlusion or significant stenosis of the intracranial   arteries. 2. Bilateral proximal internal carotid artery stenosis measuring 40% on the   right and 20% on the left. 3. Bilateral vertebral artery origin stenosis, moderate on the right and   severe on the left.      MY REVIEW:  81  Male  [employer]  Altered mental status  Encephalopathy   Mobility limited  IMPAIRMENT PT:Assessment: Pt is an 81 y.o. M. admitted 2/21 for CVA R/O. He presents oriented to person and place, but seems somewhat confused regarding situation. Jefry Nieves had difficulty following commands for strength/mobility testing, and required use of stedy to use the commode.  He would benefit from continued therapy to gradually progress OOB activity as able.  At this time, PT recommends low-moderate frequency therapy upon D/C. CODE STROKE   TIA?   Extra cranial VERTEBRAL ARTERY occlusive disease: NEUROLOGY: Given stroke risk factors I suspect this was a vascular event in the setting of known Atrial fibrillation with subtherapeutic INR.  Low back pain that is chronic. WOULD not add Aspirin to Warfarin.  Would restart Warfarin but not double antiplatelet and anticoagulation given high risk for bleeding.  Family is aware of the inherent bleeding risk with just Warfarin with his gait issues at baseline and they are aware of his AAA. MRI if able but may not due to defibrillator  EEG  Follow up with [address] [place] [address] clinic for his back pain  Limit narcotic and Benzodiazepine use  See NP for details  Tachycardia? Difficulty communicating? Language and status  ANTICOAGULATION problems INR elevated needs management and stability  ANemia  Hypertension systolic  Hyperglycemia  AGE AND COMPLEXITY! !     The Utilization Review Committee members, including its physician members, have reviewed this case and agree that this patient does meet evidence based criteria for inpatient services,  to ADMISSION =\"admit as inpatient\"  Medical care will continue to be provided by Daniel Egan MD, and NEUROLOGY who concurs with this decision and has documented his/her concurrence in the patient's medical record.     Barak Floyd MD, ALEXI, FACS, [facility]     Cell [phone]  Office [phone]  2/22/2022  5:21 PM                Neurology [facility], [place], operated by [facility] (2/22/2022) by Leonor Blake RN       Review Status Review Entered   Completed 2/23/2022 08:34      Criteria Review      Care Day: 2 Care Date: 2/22/2022 Level of Care: Intermediate Care    Guideline Day 2    Clinical Status    ( ) * No ICU or intermediate care needs    2/23/2022 8:34 AM EST by En Dennis      02/22/22 17:35:36 99.1 (37.3) 16 85 184/61     98 None (Room air)    Interventions    (X) Inpatient interventions continue    2/23/2022 8:34 AM EST by En Dennis      inr 1.3  na 129    * Milestone   Additional Notes   DATE: 2/22      Vitals:     02/22/22 17:35:36 99.1 (37.3) 16 85 184/61 - - - - 98 None (Room air)       Abnl/Pertinent Labs/Radiology/Diagnostic Studies:     inr 1.3   na 129 Physical Exam:   Expressive aphasia      MD Consults/Assessments & Plans:   MEDICINE:   Assessment/Plan:       Active Hospital Problems     Diagnosis Date Noted   · AMS (altered mental status) [R41.82] 02/21/2022   · Acute cerebrovascular accident (CVA) ([facility] [address] [address] [I63.9] 02/21/2022       Suspected stroke   - risk factor being Afib with subtherapeutic INR   - neuro consulted:  Ok to restart coumadin but do NOT do aspirin due to bleeding risk   - MRI ordered but pending due to defibrillator   - EEG ordered and peding        DVT Prophylaxis: coumadin   Diet: ADULT DIET; Dysphagia - Soft and Bite Sized   Code Status: Full Code       PT/OT Eval Status: SNF       Dispo - PCU         NEURO:   Given stroke risk factors I suspect this was a vascular event in the setting of known Atrial fibrillation with subtherapeutic INR.  Low back pain that is chronic. WOULD not add Aspirin to Warfarin.  Would restart Warfarin but not double antiplatelet and anticoagulation given high risk for bleeding.  Family is aware of the inherent bleeding risk with just Warfarin with his gait issues at baseline and they are aware of his AAA.    MRI if able but may not due to defibrillator   EEG   Follow up with [address] [place] [address] for his back pain   Limit narcotic and Benzodiazepine use   See NP for details      Medications:      aspirin EC tablet 81 mg   Dose: 81 mg   Freq: DAILY Route: PO   Start: 02/22/22 0900 End: 02/22/22 1546      warfarin (COUMADIN) tablet 7.5 mg   Dose: 7.5 mg   Freq: ONCE Warfarin Route: PO   Start: 02/22/22 1945 End: 02/22/22 1937

## 2022-02-23 NOTE — CONSULTS
Clinical Pharmacy Note  Warfarin Consult    Husam Vallejo is a 81 y.o. male receiving warfarin managed by pharmacy. Patient being bridged with none. Warfarin Indication: afib  Target INR range: 2-3   Dose prior to admission: 5 mg daily except 7.5 mg on Saturdays and Sundays    Current warfarin drug-drug interactions:     Recent Labs     02/21/22  1402 02/22/22  0458 02/22/22  1846   HGB 11.8* 11.4*  --    HCT 34.7* 33.3*  --    INR 1.51*  --  1.30*       Assessment/Plan:    Warfarin 7.5mg tonight. Daily PT/INR until stable within therapeutic range. Thank you for the consult. Will continue to follow.   Natacha Alvarez, [address] [facility]

## 2022-02-23 NOTE — PROGRESS NOTES
Hospitalist Progress Note      PCP: Karan Renteria    Date of Admission: 2/21/2022    Chief Complaint: confusion    Hospital Course:      Subjective:  A little confused but redirected after talking with family      Medications:  Reviewed    Infusion Medications    sodium chloride      dextrose       Scheduled Medications    warfarin  7.5 mg Oral Once    warfarin placeholder: dosing by pharmacy   Other RX Placeholder    sodium chloride flush  10 mL IntraVENous 2 times per day    [Held by provider] atorvastatin  40 mg Oral Nightly    [Held by provider] carvedilol  12.5 mg Oral BID    [Held by provider] donepezil  10 mg Oral Nightly    [Held by provider] ferrous sulfate  325 mg Oral Daily    [Held by provider] furosemide  40 mg Oral BID    [Held by provider] losartan  25 mg Oral Daily    pantoprazole  40 mg Oral QAM AC    tamsulosin  0.4 mg Oral Daily    insulin lispro  0-6 Units SubCUTAneous 6 times per day     PRN Meds: metoprolol, perflutren lipid microspheres, sodium chloride flush, sodium chloride, [DISCONTINUED] ondansetron **OR** ondansetron, glucose, dextrose, glucagon (rDNA), dextrose, ALPRAZolam, oxyCODONE-acetaminophen, hydrALAZINE      Intake/Output Summary (Last 24 hours) at 2/23/2022 1538  Last data filed at 2/23/2022 1420  Gross per 24 hour   Intake 240 ml   Output 700 ml   Net -460 ml       Exam:    /76   Pulse 143   Temp 97.2 °F (36.2 °C) (Oral)   Resp 18   Ht 5' 7\" (1.702 m)   Wt 143 lb 15.4 oz (65.3 kg)   SpO2 97%   BMI 22.55 kg/m²     General appearance: No apparent distress, appears stated age and cooperative. HEENT: Pupils equal, round, and reactive to light. Conjunctivae/corneas clear. Neck: Supple, with full range of motion. No jugular venous distention. Trachea midline. Respiratory:  Normal respiratory effort. Clear to auscultation, bilaterally without Rales/Wheezes/Rhonchi.   Cardiovascular: Regular rate and rhythm with normal S1/S2 without murmurs, rubs or gallops. Abdomen: Soft, non-tender, non-distended with normal bowel sounds. Musculoskeletal: No clubbing, cyanosis or edema bilaterally. Full range of motion without deformity. Skin: Skin color, texture, turgor normal.  No rashes or lesions. Neurologic:  Neurovascularly intact without any focal sensory/motor deficits. Cranial nerves: II-XII intact, grossly non-focal.  Psychiatric: Alert and oriented, thought content appropriate, normal insight  Capillary Refill: Brisk,< 3 seconds   Peripheral Pulses: +2 palpable, equal bilaterally       Labs:   Recent Labs     02/21/22  1402 02/22/22  0458   WBC 3.7* 4.2   HGB 11.8* 11.4*   HCT 34.7* 33.3*   * 108*     Recent Labs     02/21/22  1402 02/22/22  0458   * 129*   K 4.2 3.8   CL 97* 98*   CO2 18* 21   BUN 15 17   CREATININE 0.9 1.0   CALCIUM 8.6 8.3     Recent Labs     02/21/22  1402   AST 24   ALT 17   BILITOT 0.6   ALKPHOS 66     Recent Labs     02/21/22  1402 02/22/22  1846 02/23/22  0507   INR 1.51* 1.30* 1.24*     Recent Labs     02/21/22  1402 02/21/22  1554   TROPONINI 0.05* 0.05*       Urinalysis:      Lab Results   Component Value Date    NITRU Negative 02/21/2022    WBCUA 1 02/21/2022    BACTERIA 2+ 10/14/2015    RBCUA 1 02/21/2022    BLOODU Negative 02/21/2022    SPECGRAV 1.012 02/21/2022    GLUCOSEU Negative 02/21/2022    GLUCOSEU NEGATIVE 12/06/2011       Radiology:  CTA HEAD NECK W CONTRAST   Final Result   1. No large vessel occlusion or significant stenosis of the intracranial   arteries. 2. Bilateral proximal internal carotid artery stenosis measuring 40% on the   right and 20% on the left. 3. Bilateral vertebral artery origin stenosis, moderate on the right and   severe on the left. RECOMMENDATIONS:   Unavailable         CT HEAD WO CONTRAST   Final Result   No acute intracranial abnormality. Mild atrophy and white matter disease unchanged. Findings were discussed with Dr Yrai Huston at 3:12 pm on 2/21/2022.          XR CHEST PORTABLE   Final Result   No radiographic evidence of acute pulmonary disease. MRI brain without contrast    (Results Pending)           Assessment/Plan:    Active Hospital Problems    Diagnosis Date Noted    AMS (altered mental status) [R41.82] 02/21/2022    Acute cerebrovascular accident (CVA) ([facility] [address] [I63.9] 02/21/2022     Suspected stroke  - risk factor being Afib with subtherapeutic INR  - neuro consulted:  Reina Kaplan to restart coumadin but do NOT do aspirin due to bleeding risk  - MRI ordered but pending due to defibrillator  - EEG ordered and peding     DVT Prophylaxis: coumadin  Diet: ADULT DIET;  Dysphagia - Soft and Bite Sized  Code Status: Full Code    PT/OT Eval Status: SNF    Dispo - PCU    Marisa Gomes MD

## 2022-02-23 NOTE — PLAN OF CARE
Problem: Falls - Risk of:  Goal: Will remain free from falls  Description: Will remain free from falls  2/22/2022 2015 by Dago Martinez RN  Outcome: Ongoing  2/22/2022 1612 by Christa Sorensen RN  Outcome: Ongoing  Goal: Absence of physical injury  Description: Absence of physical injury  2/22/2022 2015 by Dago Martinez RN  Outcome: Ongoing  2/22/2022 1612 by Christa Sorensen RN  Outcome: Ongoing     Problem: Skin Integrity:  Goal: Will show no infection signs and symptoms  Description: Will show no infection signs and symptoms  2/22/2022 2015 by Dago Martinez RN  Outcome: Ongoing  2/22/2022 1612 by Christa Sorensen RN  Outcome: Ongoing  Goal: Absence of new skin breakdown  Description: Absence of new skin breakdown  2/22/2022 2015 by Dago Martinez RN  Outcome: Ongoing  2/22/2022 1612 by Christa Sorensen RN  Outcome: Ongoing     Problem: HEMODYNAMIC STATUS  Goal: Patient has stable vital signs and fluid balance  2/22/2022 2015 by Dago Martinez RN  Outcome: Ongoing  2/22/2022 1612 by Christa Sorensen RN  Outcome: Ongoing     Problem: ACTIVITY INTOLERANCE/IMPAIRED MOBILITY  Goal: Mobility/activity is maintained at optimum level for patient  2/22/2022 2015 by Dago Martinez RN  Outcome: Ongoing  2/22/2022 1612 by Christa Sorensen RN  Outcome: Ongoing     Problem: COMMUNICATION IMPAIRMENT  Goal: Ability to express needs and understand communication  2/22/2022 2015 by Dago Martinez RN  Outcome: Ongoing  2/22/2022 1612 by Christa Sorensen RN  Outcome: Ongoing     Problem: Pain:  Goal: Pain level will decrease  Description: Pain level will decrease  2/22/2022 2015 by Dago Martinez RN  Outcome: Ongoing  2/22/2022 1612 by Christa Sorensen RN  Outcome: Ongoing  Goal: Control of acute pain  Description: Control of acute pain  2/22/2022 2015 by Dago Martinez RN  Outcome: Ongoing  2/22/2022 1612 by Christa Sorensen RN  Outcome: Ongoing  Goal: Control of chronic pain  Description: Control of chronic pain  2/22/2022 2015 by Andrews Brown RN  Outcome: Ongoing  2/22/2022 1612 by Mile Walker RN  Outcome: Ongoing

## 2022-02-23 NOTE — PROGRESS NOTES
Attempted to communicate with wife, with whom there was language barriers. Video  without [facility] as an option. Son-in-law called and the daughter was able to speak with patient in [facility]. He agreed to EEG. Neuro and MD both at bedside. Patient is remaining agitated and physically aggressive.

## 2022-02-23 NOTE — PROGRESS NOTES
Hospitalist Progress Note      PCP: Elias Muro    Date of Admission: 2/21/2022    Chief Complaint: dysarthria    Hospital Course:      Subjective: no stroke like symptoms       Medications:  Reviewed    Infusion Medications    sodium chloride      dextrose       Scheduled Medications    warfarin placeholder: dosing by pharmacy   Other RX Placeholder    sodium chloride flush  10 mL IntraVENous 2 times per day    [Held by provider] atorvastatin  40 mg Oral Nightly    [Held by provider] carvedilol  12.5 mg Oral BID    [Held by provider] donepezil  10 mg Oral Nightly    [Held by provider] ferrous sulfate  325 mg Oral Daily    [Held by provider] furosemide  40 mg Oral BID    [Held by provider] losartan  25 mg Oral Daily    pantoprazole  40 mg Oral QAM AC    tamsulosin  0.4 mg Oral Daily    insulin lispro  0-6 Units SubCUTAneous 6 times per day     PRN Meds: perflutren lipid microspheres, sodium chloride flush, sodium chloride, [DISCONTINUED] ondansetron **OR** ondansetron, glucose, dextrose, glucagon (rDNA), dextrose, ALPRAZolam, oxyCODONE-acetaminophen, hydrALAZINE      Intake/Output Summary (Last 24 hours) at 2/22/2022 1950  Last data filed at 2/22/2022 1845  Gross per 24 hour   Intake 540 ml   Output 350 ml   Net 190 ml       Exam:    /72   Pulse 94   Temp 99 °F (37.2 °C) (Oral)   Resp 14   Ht 5' 7\" (1.702 m)   Wt 138 lb 0.1 oz (62.6 kg)   SpO2 99%   BMI 21.62 kg/m²     General appearance: No apparent distress, appears stated age and cooperative. HEENT: Pupils equal, round, and reactive to light. Conjunctivae/corneas clear. Neck: Supple, with full range of motion. No jugular venous distention. Trachea midline. Respiratory:  Normal respiratory effort. Clear to auscultation, bilaterally without Rales/Wheezes/Rhonchi. Cardiovascular: Regular rate and rhythm with normal S1/S2 without murmurs, rubs or gallops.   Abdomen: Soft, non-tender, non-distended with normal bowel sounds. Musculoskeletal: No clubbing, cyanosis or edema bilaterally. Full range of motion without deformity. Skin: Skin color, texture, turgor normal.  No rashes or lesions. Neurologic:  Neurovascularly intact without any focal sensory/motor deficits. Cranial nerves: II-XII intact, grossly non-focal.  Psychiatric: Alert and oriented, thought content appropriate, normal insight  Capillary Refill: Brisk,< 3 seconds   Peripheral Pulses: +2 palpable, equal bilaterally       Labs:   Recent Labs     02/21/22  1402 02/22/22  0458   WBC 3.7* 4.2   HGB 11.8* 11.4*   HCT 34.7* 33.3*   * 108*     Recent Labs     02/21/22  1402 02/22/22  0458   * 129*   K 4.2 3.8   CL 97* 98*   CO2 18* 21   BUN 15 17   CREATININE 0.9 1.0   CALCIUM 8.6 8.3     Recent Labs     02/21/22  1402   AST 24   ALT 17   BILITOT 0.6   ALKPHOS 66     Recent Labs     02/21/22  1402 02/22/22  1846   INR 1.51* 1.30*     Recent Labs     02/21/22  1402 02/21/22  1554   TROPONINI 0.05* 0.05*       Urinalysis:      Lab Results   Component Value Date    NITRU Negative 02/21/2022    WBCUA 1 02/21/2022    BACTERIA 2+ 10/14/2015    RBCUA 1 02/21/2022    BLOODU Negative 02/21/2022    SPECGRAV 1.012 02/21/2022    GLUCOSEU Negative 02/21/2022    GLUCOSEU NEGATIVE 12/06/2011       Radiology:  CTA HEAD NECK W CONTRAST   Final Result   1. No large vessel occlusion or significant stenosis of the intracranial   arteries. 2. Bilateral proximal internal carotid artery stenosis measuring 40% on the   right and 20% on the left. 3. Bilateral vertebral artery origin stenosis, moderate on the right and   severe on the left. RECOMMENDATIONS:   Unavailable         CT HEAD WO CONTRAST   Final Result   No acute intracranial abnormality. Mild atrophy and white matter disease unchanged. Findings were discussed with Dr Kim Raphael at 3:12 pm on 2/21/2022. XR CHEST PORTABLE   Final Result   No radiographic evidence of acute pulmonary disease.          MRI brain without contrast    (Results Pending)           Assessment/Plan:    Active Hospital Problems    Diagnosis Date Noted    AMS (altered mental status) [R41.82] 02/21/2022    Acute cerebrovascular accident (CVA) ([facility] [address] [I63.9] 02/21/2022     Suspected stroke  - risk factor being Afib with subtherapeutic INR  - neuro consulted:  Doretha Doing to restart coumadin but do NOT do aspirin due to bleeding risk  - MRI ordered but pending due to defibrillator  - EEG ordered and peding     DVT Prophylaxis: coumadin  Diet: ADULT DIET;  Dysphagia - Soft and Bite Sized  Code Status: Full Code    PT/OT Eval Status: SNF    Dispo - PCU    Sylvia Cedeno MD

## 2022-02-23 NOTE — PROGRESS NOTES
Progress Note  The patient is being evaluated for transient altered mental status, unresponsiveness. Updates  Very agitated, threatening to nursing staff. Much more calm and compliant reportedly earlier this morning. Patient is not clearly speaking [place], [facility]  not available. Family spoke with patient and able to calm him down. Awaiting MRI and EEG.      Active Ambulatory Problems     Diagnosis Date Noted    TIA (transient ischemic attack) 05/06/2017    Hip fracture requiring operative repair, left, closed, initial encounter ([facility] 03/24/2019    Hip pain, acute, left 03/24/2019    Essential hypertension 03/24/2019    Hyperlipidemia 03/24/2019    DMII (diabetes mellitus, type 2) ([facility] 03/24/2019    Atrial fibrillation (HCC) 03/24/2019    Gastritis without bleeding 07/16/2019    Epigastric pain 07/16/2019    Chest pain 07/16/2019    Shortness of breath 07/16/2019    Presence of cardiac defibrillator 07/16/2019     Resolved Ambulatory Problems     Diagnosis Date Noted    Elevated troponin 07/16/2019     Past Medical History:   Diagnosis Date    Diabetes mellitus ([facility]     Hypertension     MI (mitral incompetence)        Current Facility-Administered Medications:     metoprolol (LOPRESSOR) injection 5 mg, 5 mg, IntraVENous, Q6H PRN, Whitney Suazo, APRN - CNP, 5 mg at 02/23/22 0857    warfarin (COUMADIN) tablet 7.5 mg, 7.5 mg, Oral, Once, Marisa Gomes MD    warfarin placeholder: dosing by pharmacy, , Other, RX Placeholder, Marisa Gomes MD    perflutren lipid microspheres (DEFINITY) injection 1.65 mg, 1.5 mL, IntraVENous, ONCE PRN, Renee Mendes MD    sodium chloride flush 0.9 % injection 10 mL, 10 mL, IntraVENous, 2 times per day, Renee Mendes MD, 10 mL at 02/23/22 0858    sodium chloride flush 0.9 % injection 10 mL, 10 mL, IntraVENous, PRN, Renee Mendes MD    0.9 % sodium chloride infusion, 25 mL, IntraVENous, PRN, Renee Mendes MD    [DISCONTINUED] ondansetron (ZOFRAN-ODT) disintegrating tablet 4 mg, 4 mg, Oral, Q8H PRN **OR** ondansetron (ZOFRAN) injection 4 mg, 4 mg, IntraVENous, Q6H PRN, Adriana Cortes MD    [Held by provider] atorvastatin (LIPITOR) tablet 40 mg, 40 mg, Oral, Nightly, Tiffanie Quintanilla MD    glucose (GLUTOSE) 40 % oral gel 15 g, 15 g, Oral, PRN, Adriana Cortes MD    dextrose 50 % IV solution, 12.5 g, IntraVENous, PRN, Adriana Cortes MD    glucagon (rDNA) injection 1 mg, 1 mg, IntraMUSCular, PRN, Adriana Cortes MD    dextrose 5 % solution, 100 mL/hr, IntraVENous, PRN, Adriana Cortes MD    ALPRAZolam (XANAX) tablet 0.75 mg, 0.75 mg, Oral, Nightly PRN, Adriana Cortes MD    [Held by provider] carvedilol (COREG) tablet 12.5 mg, 12.5 mg, Oral, BID, Adriana Cortes MD    [Held by provider] donepezil (ARICEPT) tablet 10 mg, 10 mg, Oral, Nightly, Adriana Cortes MD    [Held by provider] ferrous sulfate EC tablet 325 mg, 325 mg, Oral, Daily, Adriana Cortes MD    [Held by provider] furosemide (LASIX) tablet 40 mg, 40 mg, Oral, BID, Adriana Cortes MD    [Held by provider] losartan (COZAAR) tablet 25 mg, 25 mg, Oral, Daily, Adriana Cortes MD    pantoprazole (PROTONIX) tablet 40 mg, 40 mg, Oral, QAM AC, Adriana Cortes MD, 40 mg at 02/23/22 0642    oxyCODONE-acetaminophen (PERCOCET) 5-325 MG per tablet 1 tablet, 1 tablet, Oral, TID PRN, Adriana Cortes MD    tamsulosin (FLOMAX) capsule 0.4 mg, 0.4 mg, Oral, Daily, Adriana Cortes MD, 0.4 mg at 02/23/22 0907    insulin lispro (HUMALOG) injection vial 0-6 Units, 0-6 Units, SubCUTAneous, 6 times per day, Russel Eubanks DO, 1 Units at 02/22/22 2036    hydrALAZINE (APRESOLINE) injection 10 mg, 10 mg, IntraVENous, Q6H PRN, Russel ALMA Eubanks DO      ROS -   Limited given agitation, language barrier at this time.       warfarin  7.5 mg Oral Once    warfarin placeholder: dosing by pharmacy   Other RX Placeholder    sodium chloride flush  10 mL IntraVENous 2 times per day    [Held by provider] atorvastatin  40 mg Oral Nightly  [Held by provider] carvedilol  12.5 mg Oral BID    [Held by provider] donepezil  10 mg Oral Nightly    [Held by provider] ferrous sulfate  325 mg Oral Daily    [Held by provider] furosemide  40 mg Oral BID    [Held by provider] losartan  25 mg Oral Daily    pantoprazole  40 mg Oral QAM AC    tamsulosin  0.4 mg Oral Daily    insulin lispro  0-6 Units SubCUTAneous 6 times per day         sodium chloride      dextrose          metoprolol, perflutren lipid microspheres, sodium chloride flush, sodium chloride, [DISCONTINUED] ondansetron **OR** ondansetron, glucose, dextrose, glucagon (rDNA), dextrose, ALPRAZolam, oxyCODONE-acetaminophen, hydrALAZINE     Exam:  Blood pressure 110/76, pulse 143, temperature 97.2 °F (36.2 °C), temperature source Oral, resp. rate 18, height 5' 7\" (1.702 m), weight 143 lb 15.4 oz (65.3 kg), SpO2 97 %. Constitutional    Vital signs: BP, HR, and RR reviewed   General Alert, no acute cardiopulmonary distress. Eyes: unable to visualize the fundi  Cardiovascular: pulses symmetric in all 4 extremities. No peripheral edema. Psychiatric: Agitated, threatening. Neurologic  Mental status:   orientation to person, limited given agitation, language barrier   General fund of knowledge:  limited given agitation, language barrier   Attention Poor, very agitated. Language fluent in verbage. Non english. Speaking combination of Lithuanian and Isidro concurrently. Comprehension Limited given agitation. Cranial nerves:   CN 3,4,6: Gaze central, not fixed. Looks around the room. CN7: No dana facial weakness. CN8: hearing grossly intact to conversation. Strength:   BUE: Moves arms freely, appear strong and non focal   BLE: brief purposeful movement observed. No dana hemiplegia, or focal weakness. Sensory: light touch intact in all 4 extremities. Tone: normal in all 4 extremities  Gait: deferred for safety.        Labs  Na: 129  K: 3.8  BUN: 17   Creatinine: 1.0  Glucose: 107  Ca: 8.3    Troponin: 0.05    HbA1c: 5.9  LDL: 42     WBC: 4.2  RBC: 3.72  Hgb: 11.4  Hct: 33.3  Platelet: 019      Studies    CT Head w/o 2/21/22: No acute intracranial abnormality. Mild atrophy and white matter disease unchanged. CTA Head & Neck w/ 2/21/22: 1. No large vessel occlusion or significant stenosis of the intracranial arteries. 2. Bilateral proximal internal carotid artery stenosis measuring 40% on the right and 20% on the left. 3. Bilateral vertebral artery origin stenosis, moderate on the right and severe on the left. EKG 2/23/22: Atrial fibrillation with RVR. Ventricular rate 123. Impression  1. Transient episode of altered mental status/unresponsiveness. 2.  Atrial fibrillation. INR subtherapeutic. 3.  Hyponatremia. 4.  Acute on chronic lumbar back pain. 5.  Hypertension. Miladis Mckoy is a 82 yo male with hx of atrial fibrillation on warfarin, DM, HTN, prior MI who presented with acute altered mental status-unresponsive. Seems to have improved clinically. Encounter limited today 2/2 to acute agitation. No reported seizure like activity. No dana focal neurological findings I can identify on today's limited encounter. Etiology: Not clear. Cannot exclude vascular event vs. Seizure, vs toxic metabolic, possible role of pain medications, EtOH. May have had similar event , 2-3 minutes in duration in 2017. Recommendations  - Awaiting MRI Brain w/o if able. He does have a defibrillator. If unable to have MRI Brain imaging, repeat CT Head w/o   - Awaiting routine EEG. No indication for AED at this time. - Continue anticoagulation. Advise against concurrent use with antiplatelet from a neurology perspective given risk for bleeding.   - Limit narcotic/sedating medications as able. - Telemetry monitoring.   - Rehab efforts.      Ismael Betancourt, [address] Neurology    A copy of this note was provided for Dr Lorin Pena MD

## 2022-02-24 LAB
GLUCOSE BLD-MCNC: 125 MG/DL (ref 70–99)
GLUCOSE BLD-MCNC: 127 MG/DL (ref 70–99)
GLUCOSE BLD-MCNC: 138 MG/DL (ref 70–99)
GLUCOSE BLD-MCNC: 175 MG/DL (ref 70–99)
GLUCOSE BLD-MCNC: 209 MG/DL (ref 70–99)
INR BLD: 1.13 (ref 0.88–1.12)
PERFORMED ON: ABNORMAL
PROTHROMBIN TIME: 12.8 SEC (ref 9.9–12.7)

## 2022-02-24 PROCEDURE — 6370000000 HC RX 637 (ALT 250 FOR IP): Performed by: INTERNAL MEDICINE

## 2022-02-24 PROCEDURE — 97530 THERAPEUTIC ACTIVITIES: CPT

## 2022-02-24 PROCEDURE — 36415 COLL VENOUS BLD VENIPUNCTURE: CPT

## 2022-02-24 PROCEDURE — 94760 N-INVAS EAR/PLS OXIMETRY 1: CPT

## 2022-02-24 PROCEDURE — 1200000000 HC SEMI PRIVATE

## 2022-02-24 PROCEDURE — 2580000003 HC RX 258: Performed by: INTERNAL MEDICINE

## 2022-02-24 PROCEDURE — 92507 TX SP LANG VOICE COMM INDIV: CPT

## 2022-02-24 PROCEDURE — 6370000000 HC RX 637 (ALT 250 FOR IP): Performed by: STUDENT IN AN ORGANIZED HEALTH CARE EDUCATION/TRAINING PROGRAM

## 2022-02-24 PROCEDURE — 97535 SELF CARE MNGMENT TRAINING: CPT

## 2022-02-24 PROCEDURE — 97129 THER IVNTJ 1ST 15 MIN: CPT

## 2022-02-24 PROCEDURE — 6370000000 HC RX 637 (ALT 250 FOR IP): Performed by: FAMILY MEDICINE

## 2022-02-24 PROCEDURE — 92526 ORAL FUNCTION THERAPY: CPT

## 2022-02-24 PROCEDURE — 85610 PROTHROMBIN TIME: CPT

## 2022-02-24 RX ORDER — ATORVASTATIN CALCIUM 40 MG/1
40 TABLET, FILM COATED ORAL NIGHTLY
Qty: 30 TABLET | Refills: 3 | Status: SHIPPED | OUTPATIENT
Start: 2022-02-24

## 2022-02-24 RX ORDER — WARFARIN SODIUM 7.5 MG/1
7.5 TABLET ORAL
Status: COMPLETED | OUTPATIENT
Start: 2022-02-24 | End: 2022-02-24

## 2022-02-24 RX ADMIN — SODIUM CHLORIDE, PRESERVATIVE FREE 10 ML: 5 INJECTION INTRAVENOUS at 21:15

## 2022-02-24 RX ADMIN — WARFARIN SODIUM 7.5 MG: 7.5 TABLET ORAL at 17:46

## 2022-02-24 RX ADMIN — SODIUM CHLORIDE, PRESERVATIVE FREE 10 ML: 5 INJECTION INTRAVENOUS at 09:08

## 2022-02-24 RX ADMIN — TAMSULOSIN HYDROCHLORIDE 0.4 MG: 0.4 CAPSULE ORAL at 09:07

## 2022-02-24 RX ADMIN — INSULIN LISPRO 1 UNITS: 100 INJECTION, SOLUTION INTRAVENOUS; SUBCUTANEOUS at 20:21

## 2022-02-24 RX ADMIN — INSULIN LISPRO 2 UNITS: 100 INJECTION, SOLUTION INTRAVENOUS; SUBCUTANEOUS at 12:37

## 2022-02-24 ASSESSMENT — PAIN SCALES - GENERAL
PAINLEVEL_OUTOF10: 3
PAINLEVEL_OUTOF10: 0
PAINLEVEL_OUTOF10: 0

## 2022-02-24 NOTE — DISCHARGE INSTR - COC
Continuity of Care Form    Patient Name: Tayler Mayers   :  1935  MRN:  [id number]    Admit date:  2022  Discharge date:  2022    Code Status Order: Full Code   Advance Directives:      Admitting Physician:  Ava Coleman MD  PCP: Mireya Negron    Discharging Nurse: [facility] Unit/Room#: J0Z-8600/7200-51  Discharging Unit Phone Number: [phone]    Emergency Contact:   Extended Emergency Contact Information  Primary Emergency Contact: Madeline Dean  Address: [place] [address] [address]           [facility] [address] Phone: [phone]  Relation: Spouse  Secondary Emergency Contact: [address] [place] [address] Phone: [phone]  Relation: Child    Past Surgical History:  Past Surgical History:   Procedure Laterality Date    BACK [facility]      ICD not MRI conditional.    CORONARY ANGIOPLASTY WITH STENT PLACEMENT      ESOPHAGEAL DILATATION N/A 2019    ESOPHAGEAL DILATION Joylene Chamber performed by Jere Smith MD at [address] [facility] Left 2019    CLOSED REDUCTION PERCUTANEOUS LEFT HIP PINNING performed by Ripley Rinne, MD at [place] [address] Left 2019    CLOSED REDUCTION PERCUTANEOUS LEFT HIP PINNING POSSIBLE LEFT HIP HEMIARTHROPLASTY    JOINT REPLACEMENT Bilateral     knees    PACEMAKER INSERTION      UPPER GASTROINTESTINAL ENDOSCOPY N/A 2019    EGD BIOPSY performed by Jere Smith MD at [address] [place] [address] N/A 2021    ESOPHAGOGASTRODUODENOSCOPY performed by Ranny Holter, MD at [facility] ENDOSCOPY       Immunization History:   Immunization History   Administered Date(s) Administered    COVID-19, J&J, PF, 0.5 mL 2021       Active Problems:  Patient Active Problem List   Diagnosis Code    TIA (transient ischemic attack) G45.9    Hip fracture requiring operative repair, left, closed, initial encounter [facility]) S72.002A    Hip pain, acute, left M25.552    Essential hypertension I10    Hyperlipidemia E78.5    DMII (diabetes mellitus, type 2) (HCC) E11.9    Atrial fibrillation (HCC) I48.91    Gastritis without bleeding K29.70    Epigastric pain R10.13    Chest pain R07.9    Shortness of breath R06.02    Presence of cardiac defibrillator Z95.810    AMS (altered mental status) R41.82    Acute cerebrovascular accident (CVA) ([address] I63.9       Isolation/Infection:   Isolation            No Isolation          Patient Infection Status       None to display            Nurse Assessment:  Last Vital Signs: BP (!) 162/85   Pulse 81   Temp 96.3 °F (35.7 °C) (Axillary)   Resp 16   Ht 5' 7\" (1.702 m)   Wt 149 lb 0.5 oz (67.6 kg)   SpO2 100%   BMI 23.34 kg/m²     Last documented pain score (0-10 scale): Pain Level: 0  Last Weight:   Wt Readings from Last 1 Encounters:   02/24/22 149 lb 0.5 oz (67.6 kg)     Mental Status:  oriented, alert, and periods of confusion    IV Access:  - None    Nursing Mobility/ADLs:  Walking   Assisted  Transfer  Assisted  Bathing  Assisted  Dressing  Assisted  Toileting  Assisted  Feeding  Assisted  Med Admin  Assisted  Med Delivery   whole    Wound Care Documentation and Therapy:  Wound 02/21/22 Arm Left; Lower;Proximal;Posterior (Active)   Number of days: 2       Wound 02/21/22 Ear Right (Active)   Number of days: 2       Wound 02/21/22 Knee Anterior; Left (Active)   Number of days: 2        Elimination:  Continence: Bowel: Yes  Bladder: at times  Urinary Catheter: None   Colostomy/Ileostomy/Ileal Conduit: No       Date of Last BM: 02/25/2022    Intake/Output Summary (Last 24 hours) at 2/24/2022 1056  Last data filed at 2/24/2022 0824  Gross per 24 hour   Intake 480 ml   Output 100 ml   Net 380 ml     I/O last 3 completed shifts:   In: 480 [P.O.:480]  Out: 700 [Urine:700]    Safety Concerns:     Sundowners [place] and At Risk for Falls    Impairments/Disabilities:      None    Nutrition Therapy:  Current Nutrition Therapy:   - Oral Diet:  Dysphagia 1 pureed    Routes of Feeding: Oral  Liquids: Thin Liquids  Daily Fluid Restriction: no  Last Modified Barium Swallow with Video (Video Swallowing Test): not done    Treatments at the Time of Hospital Discharge:   Respiratory Treatments:   Oxygen Therapy:  is not on home oxygen therapy. Ventilator:    - No ventilator support    Rehab Therapies: Physical Therapy and Occupational Therapy  Weight Bearing Status/Restrictions: No weight bearing restirctions  Other Medical Equipment (for information only, NOT a DME order):  walker  Other Treatments:     Patient's personal belongings (please select all that are sent with patient):  Glasses    RN SIGNATURE:  Electronically signed by Rosy Galeana RN on 2/25/22 at 4:17 PM EST    CASE MANAGEMENT/SOCIAL WORK SECTION    Inpatient Status Date: 2/21/2022    Readmission Risk Assessment Score:  Readmission Risk              Risk of Unplanned Readmission:  13           Discharging to Facility/ Agency   Name: [place] [id number]  Phone:[phone]  Fax:   OR  Discharging to Facility/ Agency   Name:  [facility] care    Address: [address] [place] [address], [address] [place] [address]  Phone: [phone]  Fax: [phone]       / signature: Electronically signed by Domingo Ren RN on 2/25/2022 at 3:32 PM      PHYSICIAN SECTION    Prognosis: Good    Condition at Discharge: Stable    Rehab Potential (if transferring to Rehab): Good    Recommended Labs or Other Treatments After Discharge: PT and OT:  Evaluate and treat. Monitor INR    Physician Certification: I certify the above information and transfer of Alexander Meza  is necessary for the continuing treatment of the diagnosis listed and that he requires [facility] for less 30 days.      Update Admission H&P: No change in H&P    PHYSICIAN SIGNATURE:  Electronically signed by Bessie Bonner Holden Mesa MD on 2/24/22 at 10:56 AM EST

## 2022-02-24 NOTE — PROGRESS NOTES
Brief Neurology Note. Chart reviewed, and conversation with Primary team, patient continued to be agitated overnight, assaultive towards staff. Required medications and 4pt restraints. Reportedly unable to have MRI, not able to complete routine EEG given cooperation. To the best of my knowledge patient has not had any further events. Primary team with plans to discharge. Patient can follow up as outpatient as needed, if family/patient wanting to pursue work up. Please call back with any questions or concerns,     Thanks,  Anthony Del Rosario, CARLENE  Neurology.

## 2022-02-24 NOTE — CARE COORDINATION
Met with patient's son in law Aubrey Frey and spoke to daughter Stephany Palacios over the phone. Discussed therapy recs for skilled nursing facility (SNF). SNF list given. Family agreeable and would like referral to [facility]. Referral made. Explained that since patient required restraints overnight, facility will need patient to be restraint free for 24 hours. Patient out of restraints since 10am this morning, only briefly required overnight. Patient calm & cooperative this morning. Electronically signed by Gonzalez Yanes RN Case Management [phone] on 2/24/2022 at 11:55 AM     The Plan for Transition of Care is related to the following treatment goals: strengthening    The patient's family was provided with a choice of provider and agrees   with the discharge plan. [x] Yes [] No    Freedom of choice list was provided with basic dialogue that supports the patient's individualized plan of care/goals, treatment preferences and shares the quality data associated with the providers.  [x] Yes [] No

## 2022-02-24 NOTE — CARE COORDINATION
Case management follow up. PT/OT recs SNF. Patient currently in restraints, agitated and was physically/verbally abusive to nurses overnight. Call out to daughter to discuss discharge planning, had to leave a message. Await return call. Electronically signed by Gonzalez Yanes RN Case Management [phone] on 2/24/2022 at 9:23 AM     Another message left with daughter, await return call.   Electronically signed by Gonzalez Yanes RN on 2/24/2022 at 11:10 AM

## 2022-02-24 NOTE — PROGRESS NOTES
Speech Language/Pathology   SPEECH LANGUAGE AND CLINICAL BEDSIDE SWALLOWING TREATMENT  Speech Therapy Department       Dayron Dean  AGE: 81 y.o. GENDER: male  : 1935  [id number]  EPISODE DATE:  2022    MEDICAL DIAGNOSIS: AMS    Chief Complaint   Patient presents with    Altered Mental Status     family called squad for new confusion and generalized weakness concerned about stroke. unknown LKW. mobile stroke unit cleared pt for stroke. B. SBP: 150.        ONSET: 2022       PAST MEDICAL HISTORY        Diagnosis Date    Atrial fibrillation ([address]     Diabetes mellitus ([address]     unsure if diabetic per patient    Hypertension     MI (mitral incompetence)        PAST SURGICAL HISTORY    Past Surgical History:   Procedure Laterality Date    BACK SURGERY      CARDIAC DEFIBRILLATOR PLACEMENT      ICD not MRI conditional.    CORONARY ANGIOPLASTY WITH STENT PLACEMENT      ESOPHAGEAL DILATATION N/A 2019    ESOPHAGEAL DILATION Katy Knoll performed by Artemio Pena MD at [address] Left 2019    CLOSED REDUCTION PERCUTANEOUS LEFT HIP PINNING performed by Pily Maya MD at [address] Left 2019    CLOSED REDUCTION PERCUTANEOUS LEFT HIP PINNING POSSIBLE LEFT HIP HEMIARTHROPLASTY    JOINT REPLACEMENT Bilateral     knees    PACEMAKER INSERTION      UPPER GASTROINTESTINAL ENDOSCOPY N/A 2019    EGD BIOPSY performed by Artemio Pena MD at [address] [facility] N/A 2021    ESOPHAGOGASTRODUODENOSCOPY performed by Ulises Saunders MD at [facility]    Allergies   Allergen Reactions    Ambien [Zolpidem]      Rash      Labetalol Itching and Rash     CHART REVIEW: Patient admitted 22 with C/O AMS H&P Patient is a 60-year-old male with past medical history of atrial fibrillation diabetes mellitus hypertension who presents to the hospital for a brief episode of dysarthria generalized weakness while having lunch.  According to the family at bedside patient was having lunch and all of a sudden he stopped talking, felt weak and was not able to hold conversations and he stopped eating. Audie Bautista was brought to the emergency department due to concern of a stroke.  Patient mentions he still feels weak however according to family he is a speaking and orientation has improved.  No reports of fevers chills belly pain chest pain.     CHEST X-RAY 2/21/22  Impression   No radiographic evidence of acute pulmonary disease.      CT HEAD 2/21/22  Impression   No acute intracranial abnormality.       Mild atrophy and white matter disease unchanged      MRI brain ordered 2/21/22-unable to complete    Prior Status: patient unable to provide history    Subjective:   Current diet: Soft/Bite-size; Thin  Pt/staff statements regarding diet tolerance: adequate tolerance reported    Cognitive-communication treatment progress: Patient appears less confused/agitated this AM, pt in 4 point restraints from agitation overnight with pt hitting staff members and unable to be redirected by family on telephone.  Restraints removed by RN during session    Objective: Assessment/Therapy activities and impression of progress/goal status   Treatment this session  Objective:  COMPREHENSION  Auditory Comprehension: []WFL [x]Mild   [x] Moderate  []Severe  []To be assessed  Patient with improved ability to follow 1 step-directions, able to engage in simple conversation related to meal, weather and answer questions related to where he is from  Impaired Yes/no questions: min cues  Impaired Basic questions: min cues  Impaired One step commands: min cues    EXPRESSION  Verbal Expression: []WFL []Mild   [x] Moderate  []Severe  []To be assessed  Impaired Automatic Speech: min cues  Impaired Confrontational Naming: min cues  Impaired Responsive Naming: max cues  Patient with tendency to code switch Moroccan/English; receptive to mod cueing for English  Patient able to express basic needs/wants related to breakfast items, remote control for television and asking where his wife was.  Able to engage in simple conversation with mod word finding deficits with patient reporting frustration with word finding     Pragmatics/Social Functioning: [x]WFL []Mild   [] Moderate  []Severe  []To be assessed      MOTOR SPEECH  Motor Speech: [x]WFL []Mild   [] Moderate  []Severe  []To be assessed    COGNITION  [x]Unable to be assessed secondary to Aphasia     Overall Orientation : []WFL []Mild   [] Moderate  [x]Severe  []To be assessed   []Unable to be assessed secondary to Aphasia   Disoriented to month reporting it was March, recalled year  Disoriented to specific details of situation but did report \"I had a stroke\"  Knows he's in hospital, unable to recall name of hospital   Oriented to self  Comment: oriented to ; age    Attention: []WFL [x]Mild   [] Moderate  []Severe  []To be assessed  Mild cues to continue feeding self    Memory: []WFL []Mild   [] Moderate  [x]Severe  []To be assessed  Impaired Daily Routines  Comment: max cues for recall for daily events/activities-unable to recall agitation previous night    Problem Solving: []WFL []Mild   [] Moderate  []Severe  [x]To be assessed  Comment: Unable to be assessed secondary to Aphasia    Safety/Judgement: []WFL []Mild   [] Moderate  []Severe  [x]To be assessed  Comment: Unable to be assessed secondary to Aphasia    Goal Status: Speech and Language Goals  Goal 1: The patient will participate in ongoing assessment with modification of goals as needed continue  Goal 2: The patient will communicate basic needs/wants utilizing any modality with 85% accuracy given min verbal cues continue  Goal 3: The patient will follow 1-step directions with 85% accuracy given min verbal cues continue  Goal 4: The patient will complete word finding tasks with 85% accuracy given min verbal cues continue      DYSPHAGIA  Positioning: Upright in bed  PO Trials: breakfast tray with hash browns, pancakes, and peaches  ·  Thin Liquids via straw: suspect premature bolus loss to the pharynx; delayed swallow; decreased laryngeal elevation; NO overt signs/symptoms of penetration/aspiration  · Nectar thick liquids: DNT  · Honey Thick liquids: DNT  · Puree: DNT/declines   · Soft food: slow prolonged mastication but adequate clearance of solids, delayed swallow initiation, decreased laryngeal elevation and cough x1 likely due to talking when attempting to swallow  · Regular food: DNT/declines  Dysphagia Tx:   · Direct Dysphagia tx: PO trials as indicated above. x1 coughing episode with solids likely due to talking when attempting to swallow. No other overt s/s of aspiration or penetration with patient consuming 75% of meal  · Dysphagia ex: NA  · Training in compensatory strategies: reinforced  · Pt response to ex/training: good, but limited insight is a barrier    Status of Dysphagia Goals: The patient will tolerate recommended diet without observed clinical signs of aspiration, continue  The patient will tolerate mechanical soft foods 10/10., not addressed  The patient/caregiver will demonstrate understanding of compensatory strategies for improved swallowing safety. , continue  The patient will tolerate thin liquids without signs and symptoms of aspiration 10/10 via cup. continue      IMPRESSIONS  Cognitive Diagnosis: Improving cognition, however confusion persists. Off by 1 for month, oriented to year and recalled information related to a stroke. Unable to recall daily information. Ongoing assessment of cognition. Communication Diagnosis: Moderate receptive and expressive aphasia.  Able to answer simple directions and follow 1-step directions during meal, cueing required for more comprehension complex information, able to communicate basic needs/wants but mod cues for word-finding to express complex thoughts and ideas. Able to engage in simple conversation, but patient continues to code switch between [place] requiring cueing for [place]. Dysphagia Diagnosis: Mild oral dysphagia characterized by slow disorganized formation of bolus with concern for excessive labor with regular textures. Suspect reduced lingual coordination. Mild pharyngeal dysphagia characterized by delayed swallow initiation and decreased laryngeal elevation. No overt s/s of aspiration or penetration across all consistencies. Recommended Diet:  Diet Solids Recommendation: Dysphagia Soft and Bite-Sized (Dysphagia III)  Liquid Consistency Recommendation: Thin  Compensatory Swallowing Strategies: Remain upright for 30-45 minutes after meals,Upright as possible for all oral intake,Small bites/sips      Plan     Requires SLP Intervention: Yes  Therapeutic Interventions: Diet tolerance monitoring,Patient/Family education,Therapeutic PO trials with SLP, Communication and cognitive-communication remediation  Duration/Frequency of Treatment: 3-5x/week during acute admission    Barriers toward progress toward pt goals:  Confusion, Cognitive deficit, Limited insight into deficits and Communication deficit    Prognosis  Prognosis for safe diet advancement: good  Barriers to reach goals: cognitive deficits    Education  Consulted and agree with results and recommendations: Patient,RN,MD  Patient Education: Patient educated on purpose of ST services/goals/recommendations  Patient Education Response: No evidence of learning      Discharge Recommendations:  Pt will benefit from continued skilled Speech Therapy for Speech and Dysphagia services, prior to returning home. Please accept this as Speech Therapy Discharge status, if pt is discharged prior to next therapy session.     Timed Code Treatment: 10 minutes  Total Treatment Time: 40 minutes (10 cog, 20 dysphagia tx, 10 speech lang)     Vannie Cogan, [place], CCC-SLP #[id number]  Speech-Language Pathologist  On

## 2022-02-24 NOTE — PROGRESS NOTES
Occupational Therapy  Facility/Department: [address] PROGRESSIVE CARE  Daily Treatment Note and Tentative D/C    NAME: Eladio Hughes  : 1935  MRN: [id number]    Date of Service: 2022    Discharge Recommendations: Eladio Hughes scored a  on the AM-PAC ADL Inpatient form. Current research shows that an AM-PAC score of 17 or less is typically not associated with a discharge to the patient's home setting. Based on the patient's AM-PAC score and their current ADL deficits, it is recommended that the patient have 3-5 sessions per week of Occupational Therapy at d/c to increase the patient's independence. Please see assessment section for further patient specific details. If patient discharges prior to next session this note will serve as a discharge summary. Please see below for the latest assessment towards goals. 3-5 sessions per week  OT Equipment Recommendations  Equipment Needed: No    Assessment   Performance deficits / Impairments: Decreased functional mobility ; Decreased strength;Decreased endurance;Decreased ADL status; Decreased cognition;Decreased balance  Assessment: Pt tolerated session well. Pt completes functional mobility and transfers with CGA and use of RW. Pt completes toileting with Max A and LB dressing with Max A. Continue with POC. Prognosis: Fair  OT Education: OT Role;ADL Adaptive Strategies; Plan of Care;Transfer Training;Precautions  REQUIRES OT FOLLOW UP: Yes  Activity Tolerance  Activity Tolerance: Patient Tolerated treatment well  Safety Devices  Safety Devices in place: Yes  Type of devices: All fall risk precautions in place;Call light within reach;Nurse notified; Patient at risk for falls;Gait belt; Chair alarm in place         Patient Diagnosis(es): The encounter diagnosis was TIA (transient ischemic attack). has a past medical history of Atrial fibrillation ([address] Diabetes mellitus ([facility] Hypertension, and MI (mitral incompetence).    has a past surgical history that includes Coronary angioplasty with stent; Pacemaker insertion; Cardiac defibrillator placement; hernia repair; back surgery; hip surgery (Left, 03/25/2019); hip pinning (Left, 03/25/2019); joint replacement (Bilateral); Upper gastrointestinal endoscopy (N/A, 08/08/2019); Esophagus dilation (N/A, 08/08/2019); and Upper gastrointestinal endoscopy (N/A, 08/25/2021). Restrictions  Restrictions/Precautions  Restrictions/Precautions: Fall Risk  Implants present? : Pacemaker  Subjective   General  Chart Reviewed: Yes  Patient assessed for rehabilitation services?: Yes  Family / Caregiver Present: No  Referring Practitioner: Meryl Kendall MD  Diagnosis: AMS; TIA  Subjective  Subjective: Pt agreeable to OT treatment. Pt with no reports of pain. General Comment  Comments: okay for therapy per RN. Orientation  Orientation  Overall Orientation Status: Impaired  Orientation Level: Disoriented to time;Oriented to place;Oriented to person;Disoriented to situation  Objective    ADL  LE Dressing: Maximum assistance (assist to don/doff brief; assist for threading B LEs and manage over hips; CGA for balance)  Toileting: Maximum assistance (assist to manage brief; assist for pericare; CGA for balance)        Balance  Sitting Balance: Supervision  Standing Balance: Contact guard assistance (RW)  Functional Mobility  Functional - Mobility Device: Rolling Walker  Activity: To/from bathroom; Other (~25ft x2, 40ft)  Assist Level: Contact guard assistance  Functional Mobility Comments: no overt LOB; assist to manage RW  Toilet Transfers  Toilet - Technique: Ambulating (RW)  Equipment Used: Grab bars  Toilet Transfer: Contact guard assistance  Wheelchair Bed Transfers  Wheelchair/Bed - Technique: Ambulating (RW)  Equipment Used: Bed;Other (bed to chair)  Level of Asssistance: Contact guard assistance  Bed mobility  Supine to Sit: Minimal assistance  Sit to Supine: Unable to assess  Scooting: Unable to assess  Transfers  Sit to stand: Contact guard assistance  Stand to sit: Contact guard assistance  Transfer Comments: up to RW           Cognition  Overall Cognitive Status: Exceptions  Arousal/Alertness: Appropriate responses to stimuli;Delayed responses to stimuli  Following Commands: Follows one step commands with increased time; Follows one step commands with repetition  Memory: Decreased recall of recent events  Insights: Decreased awareness of deficits  Initiation: Requires cues for some  Cognition Comment: pt noted to be speaking english and Algerian in the same sentence              Plan   Plan  Times per week: 3-5  Current Treatment Recommendations: Strengthening,Functional Mobility Training,Patient/Caregiver Education & Training,Endurance Training,Balance Training,Safety Education & Training,Self-Care / Michael La    AM-PAC Score        AM-PAC Inpatient Daily Activity Raw Score: 14 (02/24/22 1528)  AM-PAC Inpatient ADL T-Scale Score : 33.39 (02/24/22 1528)  ADL Inpatient CMS 0-100% Score: 59.67 (02/24/22 1528)  ADL Inpatient CMS G-Code Modifier : CK (02/24/22 1528)    Goals  Short term goals  Time Frame for Short term goals: By d/c; ongoing 2/24  Short term goal 1: Pt will complete toileting with SBA  Short term goal 2: Pt will tolerate bathroom mobility with CGA using RW  Short term goal 3: Pt will complete functional transfers with CGA  Short term goal 4: Pt will tolerate up to 2 mins of standing at sink for ADLs  Patient Goals   Patient goals :  To go home       Therapy Time   Individual Concurrent Group Co-treatment   Time In 1435         Time Out 1520         Minutes 45         Timed Code Treatment Minutes: [address], OTR/L

## 2022-02-24 NOTE — PROGRESS NOTES
12mn vitals and blood sugar not done to patient. Patient still very agitated and verbally abusive. Will minimize interaction with the patient until he calms down. Patient was seen by NP and received orders for Zyprexa 10mg IM x1 dose. Will old onto order until absolutely needed. Patient remains in 4 poin restraint. Will monitor closely.

## 2022-02-24 NOTE — PLAN OF CARE
Problem: Falls - Risk of:  Goal: Will remain free from falls  Description: Will remain free from falls  Outcome: Ongoing  Goal: Absence of physical injury  Description: Absence of physical injury  Outcome: Ongoing     Problem: Skin Integrity:  Goal: Will show no infection signs and symptoms  Description: Will show no infection signs and symptoms  Outcome: Ongoing  Goal: Absence of new skin breakdown  Description: Absence of new skin breakdown  Outcome: Ongoing     Problem: HEMODYNAMIC STATUS  Goal: Patient has stable vital signs and fluid balance  Outcome: Ongoing     Problem: COMMUNICATION IMPAIRMENT  Goal: Ability to express needs and understand communication  Outcome: Ongoing     Problem: Pain:  Goal: Pain level will decrease  Description: Pain level will decrease  Outcome: Ongoing  Goal: Control of acute pain  Description: Control of acute pain  Outcome: Ongoing  Goal: Control of chronic pain  Description: Control of chronic pain  Outcome: Ongoing

## 2022-02-24 NOTE — ACP (ADVANCE CARE PLANNING)
Advance Care Planning     Advance Care Planning Activator (Inpatient)  Conversation Note      Date of ACP Conversation: 2/24/2022     Conversation Conducted with:  Healthcare Decision Maker: Next of Kin by law (only applies in absence of above) (name) ender Fall Activator: Loco Ford:     Current Designated Health Care Decision Maker:     Primary Decision Maker: Obie Hernández - [phone]    Secondary Decision Maker: Magalie Charles - Child - [phone]    Supplemental (Other) Decision Maker: Aijanuary Paulks - Child - [phone]    Today we documented Decision Maker(s) consistent with Legal Next of Kin hierarchy. Care Preferences    Ventilation: \"If you were in your present state of health and suddenly became very ill and were unable to breathe on your own, what would your preference be about the use of a ventilator (breathing machine) if it were available to you? \"      Would the patient desire the use of ventilator (breathing machine)?: yes    \"If your health worsens and it becomes clear that your chance of recovery is unlikely, what would your preference be about the use of a ventilator (breathing machine) if it were available to you? \"     Would the patient desire the use of ventilator (breathing machine)?: No      Resuscitation  \"CPR works best to restart the heart when there is a sudden event, like a heart attack, in someone who is otherwise healthy. Unfortunately, CPR does not typically restart the heart for people who have serious health conditions or who are very sick. \"    \"In the event your heart stopped as a result of an underlying serious health condition, would you want attempts to be made to restart your heart (answer \"yes\" for attempt to resuscitate) or would you prefer a natural death (answer \"no\" for do not attempt to resuscitate)? \" yes       [] Yes   [x] No   Educated Patient / Gerianne Dawley regarding differences between Advance Directives and portable DNR orders.     Length of ACP Conversation in minutes:  5 minutes    Conversation Outcomes:  [x] ACP discussion completed  [] Existing advance directive reviewed with patient; no changes to patient's previously recorded wishes  [] New Advance Directive completed  [] Portable Do Not Rescitate prepared for Provider review and signature  [] POLST/POST/MOLST/MOST prepared for Provider review and signature      Follow-up plan:    [] Schedule follow-up conversation to continue planning  [] Referred individual to Provider for additional questions/concerns   [] Advised patient/agent/surrogate to review completed ACP document and update if needed with changes in condition, patient preferences or care setting    [x] This note routed to one or more involved healthcare providers    Electronically signed by Keya Victor RN Case Management [phone] on 2/24/2022 at 1:08 PM

## 2022-02-24 NOTE — PROGRESS NOTES
Clinical Pharmacy Note  Warfarin Consult    Alexander Meza is a 81 y.o. male receiving warfarin managed by pharmacy. Patient being bridged with none. Warfarin Indication: afib  Target INR range: 2-3   Dose prior to admission: 5 mg daily except 7.5 mg on Saturdays and Sundays    Current warfarin drug-drug interactions:     Recent Labs     02/21/22  1402 02/21/22  1402 02/22/22  0458 02/22/22  1846 02/23/22  0507 02/24/22  0756   HGB 11.8*  --  11.4*  --   --   --    HCT 34.7*  --  33.3*  --   --   --    INR 1.51*   < >  --  1.30* 1.24* 1.13*    < > = values in this interval not displayed. Assessment/Plan:    Warfarin 7.5mg tonight. Daily PT/INR until stable within therapeutic range. Thank you for the consult. Will continue to follow.     Ruth Bo, PharmD.  2/24/2022  1:18 PM

## 2022-02-24 NOTE — PROGRESS NOTES
Hospitalist Progress Note      PCP: Noe Hansen    Date of Admission: 2/21/2022    Chief Complaint: confusion    Hospital Course:      Subjective:  Unable to get MRI and EEG due to agitation      Medications:  Reviewed    Infusion Medications    sodium chloride      dextrose       Scheduled Medications    warfarin  7.5 mg Oral Once    OLANZapine  10 mg IntraMUSCular Once    warfarin placeholder: dosing by pharmacy   Other RX Placeholder    sodium chloride flush  10 mL IntraVENous 2 times per day    [Held by provider] atorvastatin  40 mg Oral Nightly    [Held by provider] carvedilol  12.5 mg Oral BID    [Held by provider] donepezil  10 mg Oral Nightly    [Held by provider] ferrous sulfate  325 mg Oral Daily    [Held by provider] furosemide  40 mg Oral BID    [Held by provider] losartan  25 mg Oral Daily    pantoprazole  40 mg Oral QAM AC    tamsulosin  0.4 mg Oral Daily    insulin lispro  0-6 Units SubCUTAneous 6 times per day     PRN Meds: metoprolol, perflutren lipid microspheres, sodium chloride flush, sodium chloride, [DISCONTINUED] ondansetron **OR** ondansetron, glucose, dextrose, glucagon (rDNA), dextrose, ALPRAZolam, oxyCODONE-acetaminophen, hydrALAZINE      Intake/Output Summary (Last 24 hours) at 2/24/2022 1517  Last data filed at 2/24/2022 1251  Gross per 24 hour   Intake 480 ml   Output    Net 480 ml       Exam:    BP (!) 152/93   Pulse 89   Temp 97.8 °F (36.6 °C) (Axillary)   Resp 20   Ht 5' 7\" (1.702 m)   Wt 149 lb 0.5 oz (67.6 kg)   SpO2 100%   BMI 23.34 kg/m²     General appearance: No apparent distress, appears stated age and cooperative. HEENT: Pupils equal, round, and reactive to light. Conjunctivae/corneas clear. Neck: Supple, with full range of motion. No jugular venous distention. Trachea midline. Respiratory:  Normal respiratory effort. Clear to auscultation, bilaterally without Rales/Wheezes/Rhonchi.   Cardiovascular: Regular rate and rhythm with normal S1/S2 without murmurs, rubs or gallops. Abdomen: Soft, non-tender, non-distended with normal bowel sounds. Musculoskeletal: No clubbing, cyanosis or edema bilaterally. Full range of motion without deformity. Skin: Skin color, texture, turgor normal.  No rashes or lesions. Neurologic:  Neurovascularly intact without any focal sensory/motor deficits. Cranial nerves: II-XII intact, grossly non-focal.  Psychiatric: Alert and oriented, thought content appropriate, normal insight  Capillary Refill: Brisk,< 3 seconds   Peripheral Pulses: +2 palpable, equal bilaterally       Labs:   Recent Labs     02/22/22  0458   WBC 4.2   HGB 11.4*   HCT 33.3*   *     Recent Labs     02/22/22  0458   *   K 3.8   CL 98*   CO2 21   BUN 17   CREATININE 1.0   CALCIUM 8.3     No results for input(s): AST, ALT, BILIDIR, BILITOT, ALKPHOS in the last 72 hours. Recent Labs     02/22/22  1846 02/23/22  0507 02/24/22  0756   INR 1.30* 1.24* 1.13*     Recent Labs     02/21/22  1554   TROPONINI 0.05*       Urinalysis:      Lab Results   Component Value Date    NITRU Negative 02/21/2022    WBCUA 1 02/21/2022    BACTERIA 2+ 10/14/2015    RBCUA 1 02/21/2022    BLOODU Negative 02/21/2022    SPECGRAV 1.012 02/21/2022    GLUCOSEU Negative 02/21/2022    GLUCOSEU NEGATIVE 12/06/2011       Radiology:  CTA HEAD NECK W CONTRAST   Final Result   1. No large vessel occlusion or significant stenosis of the intracranial   arteries. 2. Bilateral proximal internal carotid artery stenosis measuring 40% on the   right and 20% on the left. 3. Bilateral vertebral artery origin stenosis, moderate on the right and   severe on the left. RECOMMENDATIONS:   Unavailable         CT HEAD WO CONTRAST   Final Result   No acute intracranial abnormality. Mild atrophy and white matter disease unchanged. Findings were discussed with Dr Abram Serrano at 3:12 pm on 2/21/2022.          XR CHEST PORTABLE   Final Result   No radiographic evidence of acute pulmonary disease. Assessment/Plan:    Active Hospital Problems    Diagnosis Date Noted    AMS (altered mental status) [R41.82] 02/21/2022    Acute cerebrovascular accident (CVA) ([facility] [address] [I63.9] 02/21/2022     Suspected stroke  - risk factor being Afib with subtherapeutic INR  - neuro consulted:  [id number] Edie Ramirez to restart coumadin but do NOT do aspirin due to bleeding risk  - MRI ordered but unable to do due to defibrillator  - EEG ordered but cannot do due to agitation --> ok to do outpatient    Agitation:  -likely personal frustration due to language barrier and also some mild delirium    DVT Prophylaxis: coumadin  Diet: ADULT DIET;  Dysphagia - Soft and Bite Sized  Code Status: Full Code    PT/OT Eval Status: SNF    Dispo - PCU    Leonor Seth MD
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
## 2022-02-24 NOTE — PROGRESS NOTES
Patient started spitting when this nurse was trying to dress his skin tears. Face mask applied to patient.
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
## 2022-02-25 LAB
GLUCOSE BLD-MCNC: 106 MG/DL (ref 70–99)
GLUCOSE BLD-MCNC: 114 MG/DL (ref 70–99)
GLUCOSE BLD-MCNC: 133 MG/DL (ref 70–99)
GLUCOSE BLD-MCNC: 167 MG/DL (ref 70–99)
GLUCOSE BLD-MCNC: 185 MG/DL (ref 70–99)
INR BLD: 1.24 (ref 0.88–1.12)
PERFORMED ON: ABNORMAL
PROTHROMBIN TIME: 14.1 SEC (ref 9.9–12.7)

## 2022-02-25 PROCEDURE — 85610 PROTHROMBIN TIME: CPT

## 2022-02-25 PROCEDURE — 36415 COLL VENOUS BLD VENIPUNCTURE: CPT

## 2022-02-25 PROCEDURE — 6370000000 HC RX 637 (ALT 250 FOR IP): Performed by: INTERNAL MEDICINE

## 2022-02-25 PROCEDURE — 1200000000 HC SEMI PRIVATE

## 2022-02-25 PROCEDURE — 6370000000 HC RX 637 (ALT 250 FOR IP): Performed by: FAMILY MEDICINE

## 2022-02-25 PROCEDURE — 97116 GAIT TRAINING THERAPY: CPT

## 2022-02-25 PROCEDURE — 97530 THERAPEUTIC ACTIVITIES: CPT

## 2022-02-25 PROCEDURE — 92507 TX SP LANG VOICE COMM INDIV: CPT

## 2022-02-25 PROCEDURE — 6370000000 HC RX 637 (ALT 250 FOR IP): Performed by: STUDENT IN AN ORGANIZED HEALTH CARE EDUCATION/TRAINING PROGRAM

## 2022-02-25 PROCEDURE — 97129 THER IVNTJ 1ST 15 MIN: CPT

## 2022-02-25 PROCEDURE — 2580000003 HC RX 258: Performed by: INTERNAL MEDICINE

## 2022-02-25 RX ORDER — LOSARTAN POTASSIUM 25 MG/1
25 TABLET ORAL ONCE
Status: COMPLETED | OUTPATIENT
Start: 2022-02-25 | End: 2022-02-25

## 2022-02-25 RX ORDER — WARFARIN SODIUM 7.5 MG/1
7.5 TABLET ORAL
Status: COMPLETED | OUTPATIENT
Start: 2022-02-25 | End: 2022-02-25

## 2022-02-25 RX ORDER — CARVEDILOL 12.5 MG/1
12.5 TABLET ORAL 2 TIMES DAILY WITH MEALS
Status: DISCONTINUED | OUTPATIENT
Start: 2022-02-25 | End: 2022-02-26 | Stop reason: HOSPADM

## 2022-02-25 RX ADMIN — WARFARIN SODIUM 7.5 MG: 7.5 TABLET ORAL at 17:29

## 2022-02-25 RX ADMIN — LOSARTAN POTASSIUM 25 MG: 25 TABLET, FILM COATED ORAL at 15:18

## 2022-02-25 RX ADMIN — INSULIN LISPRO 1 UNITS: 100 INJECTION, SOLUTION INTRAVENOUS; SUBCUTANEOUS at 19:49

## 2022-02-25 RX ADMIN — CARVEDILOL 12.5 MG: 12.5 TABLET, FILM COATED ORAL at 15:18

## 2022-02-25 RX ADMIN — INSULIN LISPRO 1 UNITS: 100 INJECTION, SOLUTION INTRAVENOUS; SUBCUTANEOUS at 12:34

## 2022-02-25 RX ADMIN — PANTOPRAZOLE SODIUM 40 MG: 40 TABLET, DELAYED RELEASE ORAL at 06:38

## 2022-02-25 RX ADMIN — DONEPEZIL HYDROCHLORIDE 10 MG: 10 TABLET, FILM COATED ORAL at 19:48

## 2022-02-25 RX ADMIN — ATORVASTATIN CALCIUM 40 MG: 40 TABLET, FILM COATED ORAL at 19:48

## 2022-02-25 RX ADMIN — TAMSULOSIN HYDROCHLORIDE 0.4 MG: 0.4 CAPSULE ORAL at 08:40

## 2022-02-25 RX ADMIN — SODIUM CHLORIDE, PRESERVATIVE FREE 10 ML: 5 INJECTION INTRAVENOUS at 08:41

## 2022-02-25 RX ADMIN — FUROSEMIDE 40 MG: 40 TABLET ORAL at 17:29

## 2022-02-25 ASSESSMENT — PAIN SCALES - GENERAL
PAINLEVEL_OUTOF10: 0
PAINLEVEL_OUTOF10: 0

## 2022-02-25 NOTE — CARE COORDINATION
[facility]    Referral received from  to follow for home care services. I will follow for needs, and speak with patient to verify demos.     Landy Grande RN, BSN CTN  [facility] [phone]

## 2022-02-25 NOTE — CARE COORDINATION
[facility] Authorization Received via [employer] Portal:    Plan Auth ID:  Not generated  Natalia Varghesesophy ID:  [id number]  Service:  [facility]  Approval Dates:  2/25/2022-3/1/22  Next Review Date:  3/1/2022  Electronically signed by Hernandez Bates RN on 2/25/2022 at 4:28 PM

## 2022-02-25 NOTE — CARE COORDINATION
CASE MANAGEMENT DISCHARGE SUMMARY:  Patient & family agreeable to skilled nursing [facility] [phone] with facility accepted, await her return call to confirm can accept tonight and to provide her with pre cert approval information. Transport scheduled as below. RN aware of tentative dc plan pending return call from facility.     DISCHARGE DATE: 2/25/2022    DISCHARGED TO:   · Name: [facility]  · Address:  [address] [place] [address] Drive, [address] [place]   · Main Phone:  [phone]  · Fax:  [phone]             REPORT NUMBER:               FAX NUMBER:     TRANSPORTATION: [facility]             TIME: 6pm     INSURANCE PRECERT OBTAINED: yes    HENS/PASAAR COMPLETED: yes    Electronically signed by Benancio Meckel, RN Case Management [phone] on 2/25/2022 at 4:33 PM

## 2022-02-25 NOTE — CARE COORDINATION
Spoke to Po[facility] with [facility], facility has denied patient for admission due to agitation. Spoke to daughter Jaxon Friend, explained [facility] unable to accept. Anuradha to discuss with family about further nursing home choices and will call me back. Electronically signed by Antony Banerjee RN Case Management [phone] on 2/25/2022 at 8:41 AM     Daughter Jaxon Friend called back with further nursing home options. Would like referrals to following facilities (in no particular order): Home at [facility], [address] [place] [address]. Referrals made.   Electronically signed by Antony Banerjee RN Case Management [phone] on 2/25/2022 at 9:06 AM

## 2022-02-25 NOTE — PROGRESS NOTES
Hospitalist Progress Note      PCP: Fern Razo    Date of Admission: 2/21/2022    Chief Complaint: confusion    Hospital Course:      Subjective:  No complaints      Medications:  Reviewed    Infusion Medications    sodium chloride      dextrose       Scheduled Medications    warfarin  7.5 mg Oral Once    carvedilol  12.5 mg Oral BID WC    OLANZapine  10 mg IntraMUSCular Once    warfarin placeholder: dosing by pharmacy   Other RX Placeholder    sodium chloride flush  10 mL IntraVENous 2 times per day    atorvastatin  40 mg Oral Nightly    donepezil  10 mg Oral Nightly    ferrous sulfate  325 mg Oral Daily    furosemide  40 mg Oral BID    losartan  25 mg Oral Daily    pantoprazole  40 mg Oral QAM AC    tamsulosin  0.4 mg Oral Daily    insulin lispro  0-6 Units SubCUTAneous 6 times per day     PRN Meds: metoprolol, perflutren lipid microspheres, sodium chloride flush, sodium chloride, [DISCONTINUED] ondansetron **OR** ondansetron, glucose, dextrose, glucagon (rDNA), dextrose, ALPRAZolam, oxyCODONE-acetaminophen, hydrALAZINE      Intake/Output Summary (Last 24 hours) at 2/25/2022 1641  Last data filed at 2/24/2022 1748  Gross per 24 hour   Intake    Output 200 ml   Net -200 ml       Exam:    BP (!) 144/82   Pulse 93   Temp 98.5 °F (36.9 °C) (Oral)   Resp 10   Ht 5' 7\" (1.702 m)   Wt 143 lb 15.4 oz (65.3 kg)   SpO2 100%   BMI 22.55 kg/m²     General appearance: No apparent distress, appears stated age and cooperative. HEENT: Pupils equal, round, and reactive to light. Conjunctivae/corneas clear. Neck: Supple, with full range of motion. No jugular venous distention. Trachea midline. Respiratory:  Normal respiratory effort. Clear to auscultation, bilaterally without Rales/Wheezes/Rhonchi. Cardiovascular: Regular rate and rhythm with normal S1/S2 without murmurs, rubs or gallops. Abdomen: Soft, non-tender, non-distended with normal bowel sounds.   Musculoskeletal: No clubbing, cyanosis or edema bilaterally. Full range of motion without deformity. Skin: Skin color, texture, turgor normal.  No rashes or lesions. Neurologic:  Neurovascularly intact without any focal sensory/motor deficits. Cranial nerves: II-XII intact, grossly non-focal.  Psychiatric: Alert and oriented, thought content appropriate, normal insight  Capillary Refill: Brisk,< 3 seconds   Peripheral Pulses: +2 palpable, equal bilaterally       Labs:   No results for input(s): WBC, HGB, HCT, PLT in the last 72 hours. No results for input(s): NA, K, CL, CO2, BUN, CREATININE, CALCIUM, PHOS in the last 72 hours. Invalid input(s): MAGNES  No results for input(s): AST, ALT, BILIDIR, BILITOT, ALKPHOS in the last 72 hours. Recent Labs     02/23/22  0507 02/24/22  0756 02/25/22  0604   INR 1.24* 1.13* 1.24*     No results for input(s): Jonah Colvin in the last 72 hours. Urinalysis:      Lab Results   Component Value Date    NITRU Negative 02/21/2022    WBCUA 1 02/21/2022    BACTERIA 2+ 10/14/2015    RBCUA 1 02/21/2022    BLOODU Negative 02/21/2022    SPECGRAV 1.012 02/21/2022    GLUCOSEU Negative 02/21/2022    GLUCOSEU NEGATIVE 12/06/2011       Radiology:  CTA HEAD NECK W CONTRAST   Final Result   1. No large vessel occlusion or significant stenosis of the intracranial   arteries. 2. Bilateral proximal internal carotid artery stenosis measuring 40% on the   right and 20% on the left. 3. Bilateral vertebral artery origin stenosis, moderate on the right and   severe on the left. RECOMMENDATIONS:   Unavailable         CT HEAD WO CONTRAST   Final Result   No acute intracranial abnormality. Mild atrophy and white matter disease unchanged. Findings were discussed with Dr Lillie Beach at 3:12 pm on 2/21/2022. XR CHEST PORTABLE   Final Result   No radiographic evidence of acute pulmonary disease.                  Assessment/Plan:    Active Hospital Problems    Diagnosis Date Noted    AMS (altered mental status) [R41.82] 02/21/2022    Acute cerebrovascular accident (CVA) ([facility] [address] [I63.9] 02/21/2022     Suspected stroke  - risk factor being Afib with subtherapeutic INR  - neuro consulted:  [id number] Edie Ramirez to restart coumadin but do NOT do aspirin due to bleeding risk  - MRI ordered but unable to do due to defibrillator  - EEG ordered but cannot do due to agitation --> ok to do outpatient    Agitation:  -likely personal frustration due to language barrier and also some mild delirium    DVT Prophylaxis: coumadin  Diet: ADULT DIET;  Dysphagia - Soft and Bite Sized  Code Status: Full Code    PT/OT Eval Status: SNF    Dispo - PCU    Gaby Schultz MD

## 2022-02-25 NOTE — CARE COORDINATION
Discharge Delay:  Emperatriz Sutton RN in Case Management requested that SW continue to reach out to [phone] obtained from [facility] has been trying to reach Keya in admissions and has not heard back [phone], SW left several messages for Keya, called the facility and they are not aware that patient is coming- they provided another # for Keya [phone], left message, text # also. SW spoke with [facility] - they are not expecting patient this evening and do not have room for him. Asst Director of Nursing Karley([phone]) called back and advised that they can take patient tomorrow morning. Medical Transport set up for 10am transport with [facility]. Received call from  - who left message to call Corinne Hilario - [phone]. Called her and left message to call weekend SW at [phone].     Canceled medical transport with [facility] @ 6pm

## 2022-02-25 NOTE — PROGRESS NOTES
Speech Language/Pathology   SPEECH LANGUAGE AND CLINICAL BEDSIDE SWALLOWING TREATMENT  Speech Therapy Department       Dayron Dean  AGE: 81 y.o. GENDER: male  : 1935  [id number]  EPISODE DATE:  2022    MEDICAL DIAGNOSIS: AMS    Chief Complaint   Patient presents with    Altered Mental Status     family called squad for new confusion and generalized weakness concerned about stroke. unknown LKW. mobile stroke unit cleared pt for stroke. B. SBP: 150.        ONSET: 2022       PAST MEDICAL HISTORY        Diagnosis Date    [facility] fibrillation ([facility]     Diabetes mellitus ([facility]     unsure if diabetic per patient    Hypertension     MI (mitral incompetence)        PAST SURGICAL HISTORY    Past Surgical History:   Procedure Laterality Date    BACK SURGERY      CARDIAC DEFIBRILLATOR PLACEMENT      ICD not MRI conditional.    CORONARY ANGIOPLASTY WITH STENT PLACEMENT      ESOPHAGEAL DILATATION N/A 2019    ESOPHAGEAL DILATION Americo Solum performed by Kinjal Do MD at [address] [facility] Left 2019    CLOSED REDUCTION PERCUTANEOUS LEFT HIP PINNING performed by Linsey Pack MD at [address] Left 2019    CLOSED REDUCTION PERCUTANEOUS LEFT HIP PINNING POSSIBLE LEFT HIP HEMIARTHROPLASTY    JOINT REPLACEMENT Bilateral     knees    PACEMAKER INSERTION      UPPER GASTROINTESTINAL ENDOSCOPY N/A 2019    EGD BIOPSY performed by Kinjal Do MD at [facility] N/A 2021    ESOPHAGOGASTRODUODENOSCOPY performed by Robert De Jesus MD at [address] [place] [address]    Allergies   Allergen Reactions    Ambien [Zolpidem]      Rash      Labetalol Itching and Rash     CHART REVIEW: Patient admitted 22 with C/O AMS H&P Patient is a 81-year-old male with past medical history of atrial fibrillation diabetes mellitus hypertension who presents to the hospital for a brief episode of dysarthria generalized weakness while having lunch.  According to the family at bedside patient was having lunch and all of a sudden he stopped talking, felt weak and was not able to hold conversations and he stopped eating. Clint Ortega was brought to the emergency department due to concern of a stroke.  Patient mentions he still feels weak however according to family he is a speaking and orientation has improved.  No reports of fevers chills belly pain chest pain.     CHEST X-RAY 2/21/22  Impression   No radiographic evidence of acute pulmonary disease.      CT HEAD 2/21/22  Impression   No acute intracranial abnormality.       Mild atrophy and white matter disease unchanged      MRI brain ordered 2/21/22-unable to complete    Prior Status: patient unable to provide history    Subjective:   Current diet: Soft/Bite-size; Thin  Pt/staff statements regarding diet tolerance: adequate tolerance reported    Cognitive-communication treatment progress: Patient appears less confused/agitated this AM, pt in 4 point restraints from agitation overnight with pt hitting staff members and unable to be redirected by family on telephone.  Restraints removed by RN during session    Objective: Assessment/Therapy activities and impression of progress/goal status   Treatment this session  Objective:  COMPREHENSION  Auditory Comprehension: []WFL [x]Mild   [x] Moderate  []Severe  []To be assessed  Impaired Yes/no questions: min cues  Impaired Basic questions: min cues  Impaired One step commands: min cues    EXPRESSION  Verbal Expression: []WFL [x]Mild   [x] Moderate  []Severe  []To be assessed  Patient with tendency to code switch Hungarian/English; receptive to mod cueing for English  Patient able to express basic needs/wants and engage in simple conversation with mild word finding deficits  Breakdown for word-finding for complex concepts     Pragmatics/Social Functioning: [x]WFL []Mild   [] Moderate  []Severe  []To be assessed      MOTOR SPEECH  Motor Speech: [x]WFL []Mild   [] Moderate  []Severe  []To be assessed    COGNITION  [x]Unable to be assessed secondary to Aphasia     Overall Orientation : []WFL []Mild   [x] Moderate  []Severe  []To be assessed   []Unable to be assessed secondary to Aphasia   Oriented to month, recalled year  Disoriented to situation  Oriented to type of place  Oriented to self  Comment: oriented to ; age    Attention: []WFL [x]Mild   [] Moderate  []Severe  []To be assessed  Min cues for attention to task    Memory: []WFL []Mild   [x] Moderate  [x]Severe  []To be assessed  Mod cues for recall of daily events/information    Problem Solving: []WFL []Mild   [] Moderate  []Severe  [x]To be assessed  Comment: Unable to be assessed secondary to Aphasia    Safety/Judgement: []WFL []Mild   [] Moderate  []Severe  [x]To be assessed  Comment: Unable to be assessed secondary to Aphasia    Goal Status: Speech and Language Goals  Goal 1: The patient will participate in ongoing assessment with modification of goals as needed continue  Goal 2: The patient will communicate basic needs/wants utilizing any modality with 85% accuracy given min verbal cues continue  Goal 3: The patient will follow 1-step directions with 85% accuracy given min verbal cues continue  Goal 4: The patient will complete word finding tasks with 85% accuracy given min verbal cues continue      DYSPHAGIA  Positioning: NA  PO Trials: NA    Status of Dysphagia Goals: The patient will tolerate recommended diet without observed clinical signs of aspiration, continue  The patient will tolerate mechanical soft foods 10/10., not addressed  The patient/caregiver will demonstrate understanding of compensatory strategies for improved swallowing safety. , continue  The patient will tolerate thin liquids without signs and symptoms of aspiration 10/10 via cup. continue      IMPRESSIONS  Cognitive Diagnosis: Improving cognition, however confusion persists.    Communication Diagnosis: Moderate receptive and expressive aphasia. Able to answer simple directions and follow 1-step directions during meal, cueing required for more comprehension complex information, able to communicate basic needs/wants but mod cues for word-finding to express complex thoughts and ideas. Able to engage in simple conversation, but patient continues to code switch between [place] requiring cueing for [place]. Dysphagia Diagnosis: Mild oral dysphagia characterized by slow disorganized formation of bolus with concern for excessive labor with regular textures. Suspect reduced lingual coordination. Mild pharyngeal dysphagia characterized by delayed swallow initiation and decreased laryngeal elevation. No overt s/s of aspiration or penetration across all consistencies. Recommended Diet:  Diet Solids Recommendation: Dysphagia Soft and Bite-Sized (Dysphagia III)  Liquid Consistency Recommendation:  Thin  Compensatory Swallowing Strategies: Remain upright for 30-45 minutes after meals,Upright as possible for all oral intake,Small bites/sips      Plan     Requires SLP Intervention: Yes  Therapeutic Interventions: Diet tolerance monitoring,Patient/Family education,Therapeutic PO trials with SLP, Communication and cognitive-communication remediation  Duration/Frequency of Treatment: 3-5x/week during acute admission    Barriers toward progress toward pt goals:  Confusion, Cognitive deficit, Limited insight into deficits and Communication deficit    Prognosis  Prognosis for safe diet advancement: good  Barriers to reach goals: cognitive deficits    Education  Consulted and agree with results and recommendations: Patient,RN,MD  Patient Education: Patient educated on purpose of ST services/goals/recommendations  Patient Education Response: No evidence of learning      Discharge Recommendations:  Pt will benefit from continued skilled Speech Therapy for Speech and Dysphagia services, prior to returning home.    Please accept this as Speech Therapy Discharge status, if pt is discharged prior to next therapy session. Timed Code Treatment: 10 minutes  Total Treatment Time: 30 minutes (10 cog, 0 dysphagia tx, 20 speech lang)     Darlene Monet, #[id number]  Speech-Language Pathologist  Portable phone: [phone]  On 02/25/22 at 12:15 PM

## 2022-02-25 NOTE — PLAN OF CARE
Problem: Falls - Risk of:  Goal: Will remain free from falls  Description: Will remain free from falls  Outcome: Ongoing  Goal: Absence of physical injury  Description: Absence of physical injury  Outcome: Ongoing     Problem: Skin Integrity:  Goal: Will show no infection signs and symptoms  Description: Will show no infection signs and symptoms  Outcome: Ongoing  Goal: Absence of new skin breakdown  Description: Absence of new skin breakdown  Outcome: Ongoing     Problem: HEMODYNAMIC STATUS  Goal: Patient has stable vital signs and fluid balance  Outcome: Ongoing     Problem: ACTIVITY INTOLERANCE/IMPAIRED MOBILITY  Goal: Mobility/activity is maintained at optimum level for patient  Outcome: Ongoing

## 2022-02-25 NOTE — PLAN OF CARE
Problem: Falls - Risk of:  Goal: Will remain free from falls  Description: Will remain free from falls  Outcome: Ongoing  Goal: Absence of physical injury  Description: Absence of physical injury  Outcome: Ongoing     Problem: Skin Integrity:  Goal: Will show no infection signs and symptoms  Description: Will show no infection signs and symptoms  Outcome: Ongoing  Goal: Absence of new skin breakdown  Description: Absence of new skin breakdown  Outcome: Ongoing     Problem: HEMODYNAMIC STATUS  Goal: Patient has stable vital signs and fluid balance  Outcome: Ongoing     Problem: ACTIVITY INTOLERANCE/IMPAIRED MOBILITY  Goal: Mobility/activity is maintained at optimum level for patient  Outcome: Ongoing     Problem: COMMUNICATION IMPAIRMENT  Goal: Ability to express needs and understand communication  Outcome: Ongoing     Problem: Pain:  Goal: Pain level will decrease  Description: Pain level will decrease  Outcome: Ongoing  Goal: Control of acute pain  Description: Control of acute pain  Outcome: Ongoing  Goal: Control of chronic pain  Description: Control of chronic pain  Outcome: Ongoing     Problem: Non-Violent Restraints  Goal: Removal from restraints as soon as assessed to be safe  Outcome: Ongoing  Goal: No harm/injury to patient while restraints in use  Outcome: Ongoing  Goal: Patient's dignity will be maintained  Outcome: Ongoing

## 2022-02-25 NOTE — CARE COORDINATION
Spoke to Keya with Home at [facility], patient being reviewed for admission. Spoke to Gabriel Walsh with [facility], facility will follow for possible admission, but wouldn't be able to accept today. [facility] unable to accept, out of network. Electronically signed by Celso Garcia RN Case Management [phone] on 2/25/2022 at 11:32 AM     Calls out to Clara at [facility] ([phone]) & [facility] w/ [address] to see if able to accept. Had to leave voicemails, await return calls. Electronically signed by eClso Garcia RN Case Management [phone] on 2/25/2022 at 12:58 PM     [address] out of network.   Electronically signed by Celso Garcia RN on 2/25/2022 at 1:35 PM

## 2022-02-25 NOTE — PROGRESS NOTES
Clinical Pharmacy Note  Warfarin Consult    Alexander Meza is a 81 y.o. male receiving warfarin managed by pharmacy. Patient being bridged with none. Warfarin Indication: afib  Target INR range: 2-3   Dose prior to admission: 5 mg daily except 7.5 mg on Saturdays and Sundays    Current warfarin drug-drug interactions:     Recent Labs     02/23/22  0507 02/24/22  0756 02/25/22  0604   INR 1.24* 1.13* 1.24*       Assessment/Plan:    INR remains low but is trending up. Will continue with warfarin 7.5mg tonight. Daily PT/INR until stable within therapeutic range. Thank you for the consult. Will continue to follow.     Ruth Bo, PharmD.  2/25/2022  12:30 PM

## 2022-02-25 NOTE — CARE COORDINATION
Spoke to Keya with Home at [facility], this facility doesn't have any beds but has a bed open at [facility]. Spoke to daughter Chichi Ragsdale to see if okay with [facility], she is checking with family and will call me back. Electronically signed by Kevin Garrison RN Case Management [phone] on 2/25/2022 at 3:01 PM     Son in law Carla Rojas says he is going to come up and speak with patient. Family wants to see what patient would prefer home with home care vs facility for rehab. Family agreeable to [facility] if patient agreeable. Facility could accept tonight pending pre cert.     Pre cert submitted:    [employer] PRE-CERT REQUEST SUBMITTED VIA NH ACCESS PORTAL    REQUESTED SETTING:  [facility]    ANTICIPATED ADMIT DATE TO SNF:  2/25/2022    [facility] #:  [id number]    Electronically signed by Kevin Garrison RN Case Management [phone] on 2/25/2022 at 3:30 PM

## 2022-02-25 NOTE — PROGRESS NOTES
Physical Therapy  Facility/Department: [address] PROGRESSIVE CARE  Daily Treatment Note  NAME: Carin Uriostegui  : 1935  MRN: [id number]    Date of Service: 2022    Discharge Recommendations:  Patient would benefit from continued therapy after discharge,3-5 sessions per week   PT Equipment Recommendations  Equipment Needed: Cherrie Dean scored a 15/24 on the AM-PAC short mobility form. Current research shows that an AM-PAC score of 17 or less is typically not associated with a discharge to the patient's home setting. Based on the patient's AM-PAC score and their current functional mobility deficits, it is recommended that the patient have 3-5 sessions per week of Physical Therapy at d/c to increase the patient's independence. Please see assessment section for further patient specific details. If patient discharges prior to next session this note will serve as a discharge summary. Please see below for the latest assessment towards goals. Assessment   Body structures, Functions, Activity limitations: Decreased functional mobility ; Decreased safe awareness;Decreased cognition;Decreased balance  Assessment: Today, , pt continues to present confused and required re-orientation and increased time to follow commands. Pt required CGA-Guillermo for transfers and CGA up to [address] for ~20' amb with RW. Continue to recommend low-moderate frequency therapy upon D/C. Specific instructions for Next Treatment: Improve balance; practice transfers and ambulation  PT Education: Goals; General Safety;Gait Training;Orientation;Plan of Care;PT Role;Functional Mobility Training;Home Exercise Program;Injury Prevention;Transfer Training  Patient Education: DC plan  REQUIRES PT FOLLOW UP: Yes  Activity Tolerance  Activity Tolerance: Patient limited by cognitive status; Patient limited by fatigue     Patient Diagnosis(es): The encounter diagnosis was TIA (transient ischemic attack).      has a past medical history of Atrial fibrillation ([facility] [address] [address] Diabetes mellitus ([facility] Hypertension, and MI (mitral incompetence). has a past surgical history that includes Coronary angioplasty with stent; Pacemaker insertion; Cardiac defibrillator placement; hernia repair; back surgery; hip surgery (Left, 03/25/2019); hip pinning (Left, 03/25/2019); joint replacement (Bilateral); Upper gastrointestinal endoscopy (N/A, 08/08/2019); Esophagus dilation (N/A, 08/08/2019); and Upper gastrointestinal endoscopy (N/A, 08/25/2021). Restrictions  Restrictions/Precautions  Restrictions/Precautions: Fall Risk  Implants present? : Pacemaker     Subjective   General  Chart Reviewed: Yes  Additional Pertinent Hx: Patient is a 78-year-old male with past medical history of atrial fibrillation diabetes mellitus hypertension who presents to the hospital for a brief episode of dysarthria generalized weakness while having lunch. According to the family at bedside patient was having lunch and all of a sudden he stopped talking, felt weak and was not able to hold conversations and he stopped eating. Patient was brought to the emergency department due to concern of a stroke. Patient mentions he still feels weak however according to family he is a speaking and orientation has improved. No reports of fevers chills belly pain chest pain. Response To Previous Treatment: Patient with no complaints from previous session. Referring Practitioner: Dr. Lizzie Hester: Pt sitting in recliner upon arrival. Pt not oriented to time or place. Pt agreeable to PT session. Objective      Transfers  Sit to Stand: Minimal Assistance;Contact guard assistance (Guillermo to stabilize feet on ground due to slipping.  Cues for hand placement)  Stand to sit: Contact guard assistance (Cues for hand placement)  Ambulation  Ambulation?: Yes  More Ambulation?: Yes  Ambulation 1  Surface: level tile  Device: Rolling Walker  Assistance: Minimal assistance;Contact guard assistance; Moderate assistance  Quality of Gait: Kyphotic posture, short, shuffling steps, generally unstable, min-modA to assist RW during turns, requires heavy cueing for sequencing during turns  Distance: 20' with two 180 deg turns  Comments: Difficulty with command following for turns  Ambulation 2  Surface - 2: level tile  Device 2: Rolling Walker  Assistance 2: Contact guard assistance;Stand by assistance  Quality of Gait 2: Improved stability, improved speed, kyphotic posture. Distance: Several steps forward/backward            AM-PAC Score  AM-PAC Inpatient Mobility Raw Score : 15 (02/25/22 1020)  AM-PAC Inpatient T-Scale Score : 39.45 (02/25/22 1020)  Mobility Inpatient CMS 0-100% Score: 57.7 (02/25/22 1020)  Mobility Inpatient CMS G-Code Modifier : CK (02/25/22 1020)          Goals  Short term goals  Time Frame for Short term goals: In 2-3 days pt will perform  Short term goal 1: Bed mobility SBA  Short term goal 2: Transfers Min A  Short term goal 3: Ambulation 8' with walker, Min A  Patient Goals   Patient goals : Unable to state    Plan    Plan  Times per week: 2-3x  Specific instructions for Next Treatment: Improve balance; practice transfers and ambulation  Current Treatment Recommendations: Balance Training,Endurance Training,Functional Mobility Training,Transfer Training,Gait Training,Stair training,Neuromuscular Re-education,Home Exercise Program,Patient/Caregiver Education & Training,Safety Education & Training,Equipment Evaluation, Education, & procurement,Positioning,ADL/Self-care Training,Cognitive/Perceptual Training  Safety Devices  Type of devices:  All fall risk precautions in place,Call light within reach,Gait belt,Nurse notified,Chair alarm in place,Left in chair  Restraints  Initially in place: No     Therapy Time   Individual Concurrent Group Co-treatment   Time In 0945         Time Out 1017         Minutes 32         Timed Code Treatment Minutes: 32 Minutes     Electronically signed by Victoria Ellsworth, [address] on 2/25/2022 at 10:21 AM

## 2022-02-25 NOTE — PROGRESS NOTES
RN spoke with son-in-law Frantz Dempsey) with an update. All questions anwsered. Will continue to monitor.     Electronically signed by Hansa Albert RN on 2/25/2022 at 11:50 AM sig

## 2022-02-26 VITALS
WEIGHT: 138.89 LBS | RESPIRATION RATE: 16 BRPM | BODY MASS INDEX: 21.8 KG/M2 | HEIGHT: 67 IN | OXYGEN SATURATION: 100 % | SYSTOLIC BLOOD PRESSURE: 151 MMHG | HEART RATE: 126 BPM | DIASTOLIC BLOOD PRESSURE: 116 MMHG | TEMPERATURE: 98.4 F

## 2022-02-26 LAB
GLUCOSE BLD-MCNC: 114 MG/DL (ref 70–99)
GLUCOSE BLD-MCNC: 127 MG/DL (ref 70–99)
INR BLD: 1.45 (ref 0.88–1.12)
PERFORMED ON: ABNORMAL
PERFORMED ON: ABNORMAL
PROTHROMBIN TIME: 16.7 SEC (ref 9.9–12.7)

## 2022-02-26 PROCEDURE — 6370000000 HC RX 637 (ALT 250 FOR IP): Performed by: FAMILY MEDICINE

## 2022-02-26 PROCEDURE — 85610 PROTHROMBIN TIME: CPT

## 2022-02-26 PROCEDURE — 36415 COLL VENOUS BLD VENIPUNCTURE: CPT

## 2022-02-26 PROCEDURE — 6370000000 HC RX 637 (ALT 250 FOR IP): Performed by: INTERNAL MEDICINE

## 2022-02-26 RX ORDER — WARFARIN SODIUM 7.5 MG/1
7.5 TABLET ORAL
Status: DISCONTINUED | OUTPATIENT
Start: 2022-02-26 | End: 2022-02-26 | Stop reason: HOSPADM

## 2022-02-26 RX ADMIN — CARVEDILOL 12.5 MG: 12.5 TABLET, FILM COATED ORAL at 09:01

## 2022-02-26 RX ADMIN — LOSARTAN POTASSIUM 25 MG: 25 TABLET, FILM COATED ORAL at 09:01

## 2022-02-26 RX ADMIN — PANTOPRAZOLE SODIUM 40 MG: 40 TABLET, DELAYED RELEASE ORAL at 09:05

## 2022-02-26 RX ADMIN — TAMSULOSIN HYDROCHLORIDE 0.4 MG: 0.4 CAPSULE ORAL at 09:01

## 2022-02-26 RX ADMIN — FERROUS SULFATE TAB EC 324 MG (65 MG FE EQUIVALENT) 325 MG: 324 (65 FE) TABLET DELAYED RESPONSE at 09:01

## 2022-02-26 RX ADMIN — FUROSEMIDE 40 MG: 40 TABLET ORAL at 09:01

## 2022-02-26 ASSESSMENT — PAIN SCALES - GENERAL: PAINLEVEL_OUTOF10: 0

## 2022-02-26 NOTE — PLAN OF CARE
Problem: Falls - Risk of:  Goal: Will remain free from falls  Description: Will remain free from falls  2/26/2022 1058 by Tiffanie Rodriguez RN  Outcome: Completed  2/25/2022 2334 by Sakshi Stoner RN  Outcome: Ongoing  Goal: Absence of physical injury  Description: Absence of physical injury  2/26/2022 1058 by Tiffanie Rodriguez RN  Outcome: Completed  2/25/2022 2334 by Sakshi Stoner RN  Outcome: Ongoing     Problem: Skin Integrity:  Goal: Will show no infection signs and symptoms  Description: Will show no infection signs and symptoms  2/26/2022 1058 by Tiffanie Rodriguez RN  Outcome: Completed  2/25/2022 2334 by Sakshi Stoner RN  Outcome: Ongoing  Goal: Absence of new skin breakdown  Description: Absence of new skin breakdown  2/26/2022 1058 by Tiffanie Rodriguez RN  Outcome: Completed  2/25/2022 2334 by Sakshi Stoner RN  Outcome: Ongoing     Problem: HEMODYNAMIC STATUS  Goal: Patient has stable vital signs and fluid balance  2/26/2022 1058 by Tiffanie Rodriguez RN  Outcome: Completed  2/25/2022 2334 by Sakshi Stoner RN  Outcome: Ongoing     Problem: ACTIVITY INTOLERANCE/IMPAIRED MOBILITY  Goal: Mobility/activity is maintained at optimum level for patient  2/26/2022 1058 by Tiffanie Rodriguez RN  Outcome: Completed  2/25/2022 2334 by Sakshi Stoner RN  Outcome: Ongoing     Problem: COMMUNICATION IMPAIRMENT  Goal: Ability to express needs and understand communication  2/26/2022 1058 by Tiffanie Rodriguez RN  Outcome: Completed  2/25/2022 2334 by Sakshi Stoner RN  Outcome: Ongoing     Problem: Pain:  Goal: Pain level will decrease  Description: Pain level will decrease  2/26/2022 1058 by Tiffanie Rodriguez RN  Outcome: Completed  2/25/2022 2334 by Sakshi Stoner RN  Outcome: Ongoing  Goal: Control of acute pain  Description: Control of acute pain  2/26/2022 1058 by Tiffanie Rodriguez RN  Outcome: Completed  2/25/2022 2334 by Sakshi Stoner RN  Outcome: Ongoing  Goal: Control of chronic pain  Description: Control of chronic pain  2/26/2022 1058 by Levi Jones RN  Outcome: Completed  2/25/2022 2334 by Letha Kamara RN  Outcome: Ongoing     Problem: Non-Violent Restraints  Goal: Removal from restraints as soon as assessed to be safe  2/26/2022 1058 by Levi Jones RN  Outcome: Completed  2/25/2022 2334 by Letha Kamara RN  Outcome: Ongoing  Goal: No harm/injury to patient while restraints in use  2/26/2022 1058 by Levi Jones RN  Outcome: Completed  2/25/2022 2334 by Letha Kamara RN  Outcome: Ongoing  Goal: Patient's dignity will be maintained  2/26/2022 1058 by Levi Jones RN  Outcome: Completed  2/25/2022 2334 by Letha Kamara RN  Outcome: Ongoing

## 2022-02-26 NOTE — PROGRESS NOTES
Pt discharged to SNF at 1102. Discharge instructions given and explained. All questions explained. IV is removed. Dressing applied to site. Transportation to SNF provided by [facility] [employer].     Electronically signed by Mary Ray RN on 2/26/2022 at 11:03 AM

## 2022-02-26 NOTE — CARE COORDINATION
CASE MANAGEMENT DISCHARGE SUMMARY:    DISCHARGE DATE: 2/26/2022    DISCHARGED TO: [facility]                REPORT NUMBER: [id number]             FAX NUMBER: [phone]    TRANSPORTATION: [facility]              TIME: 10:00 AM     INSURANCE PRECERT OBTAINED: Yes     HENS/DEMETRIS COMPLETED: Completed     TERE Alfonso, GEOFFREY, Social Work/Case Management   [phone]  Electronically signed by TERE Alfonso, GEOFFREY on 2/26/2022 at 10:01 AM

## 2022-02-26 NOTE — CARE COORDINATION
Received text from Brando Reyes in admissions for [facility] at 7:37pm stating they do not have authorization, advised her of Jean Riddle at 4:30pm

## 2022-02-26 NOTE — PROGRESS NOTES
Clinical Pharmacy Note  Warfarin Consult    Raul Lopez is a 81 y.o. male receiving warfarin managed by pharmacy. Patient being bridged with none. Warfarin Indication: afib  Target INR range: 2-3   Dose prior to admission: 5 mg daily except 7.5 mg on Saturdays and Sundays    Current warfarin drug-drug interactions:     Recent Labs     02/24/22  0756 02/25/22  0604 02/26/22  0445   INR 1.13* 1.24* 1.45*       Assessment/Plan:  INR remains low but is trending up. Will continue with warfarin 7.5 mg tonight. Daily PT/INR until stable within therapeutic range. Thank you for the consult. Will continue to follow.     Erica Brand, [address] [place] [address], PharmD, BCPS  2/26/2022 6:43 AM

## 2022-03-01 NOTE — DISCHARGE SUMMARY
Hospital Medicine Discharge Summary    Patient ID: Sabina aSnchez      Patient's PCP: Sánchez Bias Date: 2/21/2022     Discharge Date: 2/26/2022     Admitting Physician: Aneesh Ayers MD     Discharge Physician: Gaby Schultz MD     Discharge Diagnoses: Active Hospital Problems    Diagnosis Date Noted    AMS (altered mental status) [R41.82] 02/21/2022    Acute cerebrovascular accident (CVA) ([address] [I63.9] 02/21/2022       The patient was seen and examined on day of discharge and this discharge summary is in conjunction with any daily progress note from day of discharge. Hospital Course: Suspected stroke  - risk factor being Afib with subtherapeutic INR  - neuro consulted:  Jose Armando Slipper to restart coumadin but do NOT do aspirin due to bleeding risk  - MRI ordered but unable to do due to defibrillator  - EEG ordered but cannot do due to agitation --> ok to do outpatient     Agitation:  -likely personal frustration due to language barrier and also some mild delirium      Exam:     BP (!) 151/116   Pulse 126   Temp 98.4 °F (36.9 °C) (Oral)   Resp 16   Ht 5' 7\" (1.702 m)   Wt 138 lb 14.2 oz (63 kg)   SpO2 100%   BMI 21.75 kg/m²     General appearance: No apparent distress, appears stated age and cooperative. HEENT: Pupils equal, round, and reactive to light. Conjunctivae/corneas clear. Neck: Supple, with full range of motion. No jugular venous distention. Trachea midline. Respiratory:  Normal respiratory effort. Clear to auscultation, bilaterally without Rales/Wheezes/Rhonchi. Cardiovascular: Regular rate and rhythm with normal S1/S2 without murmurs, rubs or gallops. Abdomen: Soft, non-tender, non-distended with normal bowel sounds. Musculoskelatal: No clubbing, cyanosis or edema bilaterally. Full range of motion without deformity. Skin: Skin color, texture, turgor normal.  No rashes or lesions. Neurologic:  Neurovascularly intact without any focal sensory/motor deficits.  Cranial nerves: II-XII intact, grossly non-focal.  Psychiatric: Alert and oriented, thought content appropriate, normal insight      Consults:     IP CONSULT TO STROKE TEAM  IP CONSULT TO PHARMACY  PHARMACY TO CHANGE BASE FLUIDS  IP CONSULT TO HOSPITALIST  IP CONSULT TO NEUROLOGY  IP CONSULT TO CASE MANAGEMENT  IP CONSULT TO PHARMACY    Significant Diagnostic Studies:          Radiology:  CTA HEAD NECK W CONTRAST   Final Result   1. No large vessel occlusion or significant stenosis of the intracranial   arteries. 2. Bilateral proximal internal carotid artery stenosis measuring 40% on the   right and 20% on the left. 3. Bilateral vertebral artery origin stenosis, moderate on the right and   severe on the left.       RECOMMENDATIONS:   Unavailable           CT HEAD WO CONTRAST   Final Result   No acute intracranial abnormality.       Mild atrophy and white matter disease unchanged.       Findings were discussed with Dr Marshal Stone at 3:12 pm on 2/21/2022.           XR CHEST PORTABLE   Final Result   No radiographic evidence of acute pulmonary disease.                 PCP/SNF to follow up: outpatient EEG and MRI as symptoms reoccur    Disposition:  SNF    Condition on D/C:  Stable    Discharge Instructions/Follow-up:  F/u with neurology within 1 month    Code Status:  Prior     Activity: activity as tolerated    Diet: cardiac diet    Labs:  For convenience and continuity at follow-up the following most recent labs are provided:      CBC:    Lab Results   Component Value Date    WBC 4.2 02/22/2022    HGB 11.4 02/22/2022    HCT 33.3 02/22/2022     02/22/2022       Renal:    Lab Results   Component Value Date     02/22/2022    K 3.8 02/22/2022    CL 98 02/22/2022    CO2 21 02/22/2022    BUN 17 02/22/2022    CREATININE 1.0 02/22/2022    CALCIUM 8.3 02/22/2022       Discharge Medications:     Discharge Medication List as of 2/26/2022  9:34 AM           Details   atorvastatin (LIPITOR) 40 MG tablet Take 1 tablet by mouth nightly, Disp-30 tablet, R-3Print              Details   oxyCODONE-acetaminophen (PERCOCET) 5-325 MG per tablet Take 1 tablet by mouth 3 times daily as needed for Pain. Historical Med      donepezil (ARICEPT) 10 MG tablet Take 10 mg by mouth nightlyHistorical Med      ALPRAZolam (XANAX) 0.25 MG tablet Take 0.75 mg by mouth nightly as needed for Sleep. Historical Med      nabumetone (RELAFEN) 750 MG tablet Take 750 mg by mouth 2 times daily as needed for Pain Historical Med      warfarin (COUMADIN) 2.5 MG tablet Take 5-7.5 mg by mouth every evening 5 mg daily EXCEPT and 7.5 mg FRI/SATHistorical Med      docusate sodium (COLACE, DULCOLAX) 100 MG CAPS Take 100 mg by mouth 2 times daily, Disp-60 capsule, R-0Normal      losartan (COZAAR) 25 MG tablet Take 25 mg by mouth dailyHistorical Med      tamsulosin (FLOMAX) 0.4 MG capsule Take 0.4 mg by mouth dailyHistorical Med      levocetirizine (XYZAL) 5 MG tablet Take 5 mg by mouth dailyHistorical Med      omeprazole (PRILOSEC) 20 MG delayed release capsule Take 40 mg by mouth daily Historical Med      carvedilol (COREG) 12.5 MG tablet Take 1 tablet by mouth 2 times dailyHistorical Med      ferrous sulfate 325 (65 FE) MG tablet Take 325 mg by mouth dailyHistorical Med      furosemide (LASIX) 40 MG tablet Take 1 tablet by mouth 2 times daily Historical Med             Time Spent on discharge is more than 45 minutes in the examination, evaluation, counseling and review of medications and discharge plan. Signed: Erickson Betancur MD   3/1/2022      Thank you SADIE THAO for the opportunity to be involved in this patient's care. If you have any questions or concerns please feel free to contact me at [phone].

## 2022-03-01 NOTE — PROGRESS NOTES
Physician Progress Note      Arely Maxwell  CSN #:                  [id number]  :                       1935  ADMIT DATE:       2022 1:43 PM  [address] DATE:        2022 11:04 AM  RESPONDING  PROVIDER #:        Paulette Chapman MD          QUERY TEXT:    Dear Dr. Robbi Hester,  Pt admitted with Suspected CVA. Pt noted to have AMS and \"new confusion\" with   reported \"normally oriented\",  If possible, please document in the progress   notes and discharge summary if you are evaluating and / or treating any of the   following: The medical record reflects the following:  Risk Factors: Hx DM, chronic back pain, HTN, A-fib, on Coumadin noted   subtherapeutic INR, (speaks in [place], [facility] [place] and [facility][1] and alternates)  Clinical Indicators:  ED for AMS per EMS \"new confusion\", \"weakness\",   concern for stroke, Glucose=171, PVZ=006,  presents confused, family reports normally oriented, sudden onset unable to   speak and became unresponsive, Narcan given by EMS and became arousable (on   chronic Percocet for pain)BP (!) 161/77 ,  WBC=3.7,Gd=015, co2=18, Anion   gap=17,  trop=0.05,CTA-Bilateral vertebral artery origin stenosis, moderate on   the right and severe on the left. ED notes \"reevaluation the patient is   completely lucid. \", H and P notes alert and oriented, Admit for AMS, Acute   CVA, Episode of dysarthria, generalized weakness, rule out CVA, Hyponatremia,   Elevated troponin likely demand ischemia,  Na=12  Treatment: monitor labs, Neuro consult, EEG, MRI, ASA, Statin, Coumadin, bed   and chair alarms, restraints, Zyprexa  Thank you,  Maribell Otero RN, [email or web address]  Options provided:  -- Metabolic encephalopathy  -- Encephalopathy due to CVA  -- Hypertensive encephalopathy  -- Toxic encephalopathy  -- Delirium due to, Please specify cause.   -- Delirium  -- Other - I will add my own diagnosis  -- Disagree - Not applicable / Not valid  -- Disagree - Clinically unable to determine / Unknown  -- Refer to Clinical Documentation Reviewer    PROVIDER RESPONSE TEXT:    Provider is clinically unable to determine a response to this query. Query created by:  [id number] on 2/23/2022 3:25 PM      Electronically signed by:  Shari Dupree MD 3/1/2022 12:03 PM

## 2022-05-09 NOTE — CONSULTS
Clinical Pharmacy Note  Warfarin Consult    Consuelo Lofton is a 81 y.o. male receiving warfarin managed by pharmacy. Patient being bridged with none. Warfarin Indication: afib  Target INR range: 2-3   Dose prior to admission: 5-7.5 mg    Current warfarin drug-drug interactions:     Recent Labs     02/21/22  1402   HGB 11.8*   HCT 34.7*   INR 1.51*       Assessment/Plan:    Warfarin 5 mg tonight. Daily PT/INR until stable within therapeutic range. Thank you for the consult. Will continue to follow. none

## 2023-01-01 ENCOUNTER — NURSE ONLY (OUTPATIENT)
Dept: CARDIOLOGY CLINIC | Age: 88
End: 2023-01-01
Payer: COMMERCIAL

## 2023-01-01 ENCOUNTER — APPOINTMENT (OUTPATIENT)
Dept: GENERAL RADIOLOGY | Age: 88
DRG: 194 | End: 2023-01-01
Payer: COMMERCIAL

## 2023-01-01 ENCOUNTER — HOSPITAL ENCOUNTER (INPATIENT)
Age: 88
LOS: 17 days | DRG: 194 | End: 2023-05-25
Attending: EMERGENCY MEDICINE | Admitting: STUDENT IN AN ORGANIZED HEALTH CARE EDUCATION/TRAINING PROGRAM
Payer: COMMERCIAL

## 2023-01-01 ENCOUNTER — APPOINTMENT (OUTPATIENT)
Dept: CT IMAGING | Age: 88
DRG: 194 | End: 2023-01-01
Payer: COMMERCIAL

## 2023-01-01 VITALS
BODY MASS INDEX: 23.88 KG/M2 | OXYGEN SATURATION: 96 % | RESPIRATION RATE: 29 BRPM | DIASTOLIC BLOOD PRESSURE: 48 MMHG | HEART RATE: 79 BPM | SYSTOLIC BLOOD PRESSURE: 74 MMHG | HEIGHT: 65 IN | TEMPERATURE: 100.9 F | WEIGHT: 143.3 LBS

## 2023-01-01 DIAGNOSIS — I50.9 ACUTE ON CHRONIC CONGESTIVE HEART FAILURE, UNSPECIFIED HEART FAILURE TYPE (HCC): ICD-10-CM

## 2023-01-01 DIAGNOSIS — Z95.810 CARDIAC DEFIBRILLATOR IN SITU: Primary | ICD-10-CM

## 2023-01-01 DIAGNOSIS — I50.23 ACUTE ON CHRONIC SYSTOLIC HEART FAILURE (HCC): ICD-10-CM

## 2023-01-01 DIAGNOSIS — J96.01 ACUTE RESPIRATORY FAILURE WITH HYPOXIA (HCC): Primary | ICD-10-CM

## 2023-01-01 DIAGNOSIS — I48.0 PAF (PAROXYSMAL ATRIAL FIBRILLATION) (HCC): ICD-10-CM

## 2023-01-01 DIAGNOSIS — Z95.810 PRESENCE OF CARDIAC DEFIBRILLATOR: ICD-10-CM

## 2023-01-01 LAB
ACANTHOCYTES BLD QL SMEAR: ABNORMAL
ALBUMIN SERPL-MCNC: 3 G/DL (ref 3.4–5)
ALBUMIN SERPL-MCNC: 3.3 G/DL (ref 3.4–5)
ALBUMIN/GLOB SERPL: 1.3 {RATIO} (ref 1.1–2.2)
ALBUMIN/GLOB SERPL: 1.4 {RATIO} (ref 1.1–2.2)
ALP SERPL-CCNC: 61 U/L (ref 40–129)
ALP SERPL-CCNC: 69 U/L (ref 40–129)
ALT SERPL-CCNC: 7 U/L (ref 10–40)
ALT SERPL-CCNC: 8 U/L (ref 10–40)
ANION GAP SERPL CALCULATED.3IONS-SCNC: 10 MMOL/L (ref 3–16)
ANION GAP SERPL CALCULATED.3IONS-SCNC: 12 MMOL/L (ref 3–16)
ANION GAP SERPL CALCULATED.3IONS-SCNC: 13 MMOL/L (ref 3–16)
ANION GAP SERPL CALCULATED.3IONS-SCNC: 6 MMOL/L (ref 3–16)
ANION GAP SERPL CALCULATED.3IONS-SCNC: 7 MMOL/L (ref 3–16)
ANION GAP SERPL CALCULATED.3IONS-SCNC: 8 MMOL/L (ref 3–16)
ANION GAP SERPL CALCULATED.3IONS-SCNC: 9 MMOL/L (ref 3–16)
ANION GAP SERPL CALCULATED.3IONS-SCNC: 9 MMOL/L (ref 3–16)
ANISOCYTOSIS BLD QL SMEAR: ABNORMAL
AST SERPL-CCNC: 13 U/L (ref 15–37)
AST SERPL-CCNC: 16 U/L (ref 15–37)
BACTERIA URNS QL MICRO: ABNORMAL /HPF
BASE EXCESS BLDA CALC-SCNC: -3.3 MMOL/L (ref -3–3)
BASE EXCESS BLDV CALC-SCNC: -2.7 MMOL/L
BASOPHILS # BLD: 0 K/UL (ref 0–0.2)
BASOPHILS # BLD: 0.1 K/UL (ref 0–0.2)
BASOPHILS NFR BLD: 0 %
BASOPHILS NFR BLD: 0.1 %
BASOPHILS NFR BLD: 0.2 %
BASOPHILS NFR BLD: 1 %
BILIRUB SERPL-MCNC: 0.7 MG/DL (ref 0–1)
BILIRUB SERPL-MCNC: 1.1 MG/DL (ref 0–1)
BILIRUB UR QL STRIP.AUTO: NEGATIVE
BUN SERPL-MCNC: 17 MG/DL (ref 7–20)
BUN SERPL-MCNC: 21 MG/DL (ref 7–20)
BUN SERPL-MCNC: 30 MG/DL (ref 7–20)
BUN SERPL-MCNC: 32 MG/DL (ref 7–20)
BUN SERPL-MCNC: 33 MG/DL (ref 7–20)
BUN SERPL-MCNC: 35 MG/DL (ref 7–20)
BUN SERPL-MCNC: 36 MG/DL (ref 7–20)
BUN SERPL-MCNC: 41 MG/DL (ref 7–20)
BUN SERPL-MCNC: 47 MG/DL (ref 7–20)
BUN SERPL-MCNC: 49 MG/DL (ref 7–20)
BUN SERPL-MCNC: 51 MG/DL (ref 7–20)
BUN SERPL-MCNC: 52 MG/DL (ref 7–20)
BUN SERPL-MCNC: 55 MG/DL (ref 7–20)
BURR CELLS BLD QL SMEAR: ABNORMAL
BURR CELLS BLD QL SMEAR: ABNORMAL
CALCIUM SERPL-MCNC: 7.6 MG/DL (ref 8.3–10.6)
CALCIUM SERPL-MCNC: 7.6 MG/DL (ref 8.3–10.6)
CALCIUM SERPL-MCNC: 7.8 MG/DL (ref 8.3–10.6)
CALCIUM SERPL-MCNC: 7.9 MG/DL (ref 8.3–10.6)
CALCIUM SERPL-MCNC: 8 MG/DL (ref 8.3–10.6)
CALCIUM SERPL-MCNC: 8 MG/DL (ref 8.3–10.6)
CALCIUM SERPL-MCNC: 8.1 MG/DL (ref 8.3–10.6)
CALCIUM SERPL-MCNC: 8.3 MG/DL (ref 8.3–10.6)
CALCIUM SERPL-MCNC: 8.4 MG/DL (ref 8.3–10.6)
CALCIUM SERPL-MCNC: 8.7 MG/DL (ref 8.3–10.6)
CHLORIDE SERPL-SCNC: 91 MMOL/L (ref 99–110)
CHLORIDE SERPL-SCNC: 91 MMOL/L (ref 99–110)
CHLORIDE SERPL-SCNC: 92 MMOL/L (ref 99–110)
CHLORIDE SERPL-SCNC: 93 MMOL/L (ref 99–110)
CHLORIDE SERPL-SCNC: 94 MMOL/L (ref 99–110)
CHLORIDE SERPL-SCNC: 95 MMOL/L (ref 99–110)
CHLORIDE SERPL-SCNC: 96 MMOL/L (ref 99–110)
CHLORIDE SERPL-SCNC: 97 MMOL/L (ref 99–110)
CHLORIDE SERPL-SCNC: 98 MMOL/L (ref 99–110)
CLARITY UR: CLEAR
CO2 BLDA-SCNC: 22.1 MMOL/L
CO2 BLDV-SCNC: 24 MMOL/L
CO2 SERPL-SCNC: 20 MMOL/L (ref 21–32)
CO2 SERPL-SCNC: 20 MMOL/L (ref 21–32)
CO2 SERPL-SCNC: 26 MMOL/L (ref 21–32)
CO2 SERPL-SCNC: 27 MMOL/L (ref 21–32)
CO2 SERPL-SCNC: 28 MMOL/L (ref 21–32)
CO2 SERPL-SCNC: 28 MMOL/L (ref 21–32)
CO2 SERPL-SCNC: 29 MMOL/L (ref 21–32)
CO2 SERPL-SCNC: 30 MMOL/L (ref 21–32)
CO2 SERPL-SCNC: 31 MMOL/L (ref 21–32)
CO2 SERPL-SCNC: 31 MMOL/L (ref 21–32)
CO2 SERPL-SCNC: 32 MMOL/L (ref 21–32)
CO2 SERPL-SCNC: 32 MMOL/L (ref 21–32)
CO2 SERPL-SCNC: 33 MMOL/L (ref 21–32)
COHGB MFR BLDA: 1.7 % (ref 0–1.5)
COHGB MFR BLDV: 1.9 %
COLOR UR: YELLOW
CREAT SERPL-MCNC: 1.4 MG/DL (ref 0.8–1.3)
CREAT SERPL-MCNC: 1.4 MG/DL (ref 0.8–1.3)
CREAT SERPL-MCNC: 1.5 MG/DL (ref 0.8–1.3)
CREAT SERPL-MCNC: 1.6 MG/DL (ref 0.8–1.3)
CREAT SERPL-MCNC: 1.7 MG/DL (ref 0.8–1.3)
CREAT SERPL-MCNC: 1.9 MG/DL (ref 0.8–1.3)
CREAT SERPL-MCNC: 1.9 MG/DL (ref 0.8–1.3)
CREAT SERPL-MCNC: 2 MG/DL (ref 0.8–1.3)
CRP SERPL-MCNC: 130.1 MG/L (ref 0–5.1)
CRP SERPL-MCNC: 135.7 MG/L (ref 0–5.1)
CRP SERPL-MCNC: 85.5 MG/L (ref 0–5.1)
DEPRECATED RDW RBC AUTO: 13.6 % (ref 12.4–15.4)
DEPRECATED RDW RBC AUTO: 13.8 % (ref 12.4–15.4)
DEPRECATED RDW RBC AUTO: 13.8 % (ref 12.4–15.4)
DEPRECATED RDW RBC AUTO: 13.9 % (ref 12.4–15.4)
DEPRECATED RDW RBC AUTO: 14 % (ref 12.4–15.4)
DEPRECATED RDW RBC AUTO: 14.1 % (ref 12.4–15.4)
DEPRECATED RDW RBC AUTO: 14.2 % (ref 12.4–15.4)
DEPRECATED RDW RBC AUTO: 14.3 % (ref 12.4–15.4)
EKG ATRIAL RATE: 99 BPM
EKG DIAGNOSIS: NORMAL
EKG P AXIS: 51 DEGREES
EKG P-R INTERVAL: 242 MS
EKG Q-T INTERVAL: 360 MS
EKG QRS DURATION: 144 MS
EKG QTC CALCULATION (BAZETT): 462 MS
EKG R AXIS: -36 DEGREES
EKG T AXIS: 22 DEGREES
EKG VENTRICULAR RATE: 99 BPM
EOSINOPHIL # BLD: 0 K/UL (ref 0–0.6)
EOSINOPHIL # BLD: 0.1 K/UL (ref 0–0.6)
EOSINOPHIL # BLD: 0.2 K/UL (ref 0–0.6)
EOSINOPHIL # BLD: 0.2 K/UL (ref 0–0.6)
EOSINOPHIL # BLD: 0.3 K/UL (ref 0–0.6)
EOSINOPHIL # BLD: 0.5 K/UL (ref 0–0.6)
EOSINOPHIL NFR BLD: 0 %
EOSINOPHIL NFR BLD: 0.1 %
EOSINOPHIL NFR BLD: 0.3 %
EOSINOPHIL NFR BLD: 0.4 %
EOSINOPHIL NFR BLD: 2.8 %
EOSINOPHIL NFR BLD: 3.3 %
EOSINOPHIL NFR BLD: 4 %
EOSINOPHIL NFR BLD: 4.2 %
EPI CELLS #/AREA URNS AUTO: 0 /HPF (ref 0–5)
EST. AVERAGE GLUCOSE BLD GHB EST-MCNC: 105.4 MG/DL
FERRITIN SERPL IA-MCNC: 905 NG/ML (ref 30–400)
FLUAV RNA UPPER RESP QL NAA+PROBE: NEGATIVE
FLUBV AG NPH QL: NEGATIVE
FOLATE SERPL-MCNC: 14.87 NG/ML (ref 4.78–24.2)
GFR SERPLBLD CREATININE-BSD FMLA CKD-EPI: 32 ML/MIN/{1.73_M2}
GFR SERPLBLD CREATININE-BSD FMLA CKD-EPI: 34 ML/MIN/{1.73_M2}
GFR SERPLBLD CREATININE-BSD FMLA CKD-EPI: 34 ML/MIN/{1.73_M2}
GFR SERPLBLD CREATININE-BSD FMLA CKD-EPI: 38 ML/MIN/{1.73_M2}
GFR SERPLBLD CREATININE-BSD FMLA CKD-EPI: 41 ML/MIN/{1.73_M2}
GFR SERPLBLD CREATININE-BSD FMLA CKD-EPI: 45 ML/MIN/{1.73_M2}
GFR SERPLBLD CREATININE-BSD FMLA CKD-EPI: 49 ML/MIN/{1.73_M2}
GFR SERPLBLD CREATININE-BSD FMLA CKD-EPI: 49 ML/MIN/{1.73_M2}
GLUCOSE BLD-MCNC: 101 MG/DL (ref 70–99)
GLUCOSE BLD-MCNC: 110 MG/DL (ref 70–99)
GLUCOSE BLD-MCNC: 115 MG/DL (ref 70–99)
GLUCOSE BLD-MCNC: 115 MG/DL (ref 70–99)
GLUCOSE BLD-MCNC: 117 MG/DL (ref 70–99)
GLUCOSE BLD-MCNC: 119 MG/DL (ref 70–99)
GLUCOSE BLD-MCNC: 122 MG/DL (ref 70–99)
GLUCOSE BLD-MCNC: 123 MG/DL (ref 70–99)
GLUCOSE BLD-MCNC: 128 MG/DL (ref 70–99)
GLUCOSE BLD-MCNC: 133 MG/DL (ref 70–99)
GLUCOSE BLD-MCNC: 135 MG/DL (ref 70–99)
GLUCOSE BLD-MCNC: 136 MG/DL (ref 70–99)
GLUCOSE BLD-MCNC: 140 MG/DL (ref 70–99)
GLUCOSE BLD-MCNC: 143 MG/DL (ref 70–99)
GLUCOSE BLD-MCNC: 145 MG/DL (ref 70–99)
GLUCOSE BLD-MCNC: 145 MG/DL (ref 70–99)
GLUCOSE BLD-MCNC: 146 MG/DL (ref 70–99)
GLUCOSE BLD-MCNC: 147 MG/DL (ref 70–99)
GLUCOSE BLD-MCNC: 148 MG/DL (ref 70–99)
GLUCOSE BLD-MCNC: 151 MG/DL (ref 70–99)
GLUCOSE BLD-MCNC: 155 MG/DL (ref 70–99)
GLUCOSE BLD-MCNC: 168 MG/DL (ref 70–99)
GLUCOSE BLD-MCNC: 169 MG/DL (ref 70–99)
GLUCOSE BLD-MCNC: 171 MG/DL (ref 70–99)
GLUCOSE BLD-MCNC: 172 MG/DL (ref 70–99)
GLUCOSE BLD-MCNC: 172 MG/DL (ref 70–99)
GLUCOSE BLD-MCNC: 175 MG/DL (ref 70–99)
GLUCOSE BLD-MCNC: 177 MG/DL (ref 70–99)
GLUCOSE BLD-MCNC: 178 MG/DL (ref 70–99)
GLUCOSE BLD-MCNC: 180 MG/DL (ref 70–99)
GLUCOSE BLD-MCNC: 184 MG/DL (ref 70–99)
GLUCOSE BLD-MCNC: 184 MG/DL (ref 70–99)
GLUCOSE BLD-MCNC: 186 MG/DL (ref 70–99)
GLUCOSE BLD-MCNC: 186 MG/DL (ref 70–99)
GLUCOSE BLD-MCNC: 188 MG/DL (ref 70–99)
GLUCOSE BLD-MCNC: 190 MG/DL (ref 70–99)
GLUCOSE BLD-MCNC: 191 MG/DL (ref 70–99)
GLUCOSE BLD-MCNC: 196 MG/DL (ref 70–99)
GLUCOSE BLD-MCNC: 196 MG/DL (ref 70–99)
GLUCOSE BLD-MCNC: 197 MG/DL (ref 70–99)
GLUCOSE BLD-MCNC: 197 MG/DL (ref 70–99)
GLUCOSE BLD-MCNC: 199 MG/DL (ref 70–99)
GLUCOSE BLD-MCNC: 204 MG/DL (ref 70–99)
GLUCOSE BLD-MCNC: 206 MG/DL (ref 70–99)
GLUCOSE BLD-MCNC: 206 MG/DL (ref 70–99)
GLUCOSE BLD-MCNC: 207 MG/DL (ref 70–99)
GLUCOSE BLD-MCNC: 208 MG/DL (ref 70–99)
GLUCOSE BLD-MCNC: 208 MG/DL (ref 70–99)
GLUCOSE BLD-MCNC: 212 MG/DL (ref 70–99)
GLUCOSE BLD-MCNC: 216 MG/DL (ref 70–99)
GLUCOSE BLD-MCNC: 218 MG/DL (ref 70–99)
GLUCOSE BLD-MCNC: 220 MG/DL (ref 70–99)
GLUCOSE BLD-MCNC: 225 MG/DL (ref 70–99)
GLUCOSE BLD-MCNC: 228 MG/DL (ref 70–99)
GLUCOSE BLD-MCNC: 231 MG/DL (ref 70–99)
GLUCOSE BLD-MCNC: 239 MG/DL (ref 70–99)
GLUCOSE BLD-MCNC: 242 MG/DL (ref 70–99)
GLUCOSE BLD-MCNC: 252 MG/DL (ref 70–99)
GLUCOSE BLD-MCNC: 256 MG/DL (ref 70–99)
GLUCOSE BLD-MCNC: 291 MG/DL (ref 70–99)
GLUCOSE BLD-MCNC: 74 MG/DL (ref 70–99)
GLUCOSE BLD-MCNC: 75 MG/DL (ref 70–99)
GLUCOSE BLD-MCNC: 82 MG/DL (ref 70–99)
GLUCOSE BLD-MCNC: 87 MG/DL (ref 70–99)
GLUCOSE BLD-MCNC: 99 MG/DL (ref 70–99)
GLUCOSE SERPL-MCNC: 119 MG/DL (ref 70–99)
GLUCOSE SERPL-MCNC: 125 MG/DL (ref 70–99)
GLUCOSE SERPL-MCNC: 127 MG/DL (ref 70–99)
GLUCOSE SERPL-MCNC: 130 MG/DL (ref 70–99)
GLUCOSE SERPL-MCNC: 131 MG/DL (ref 70–99)
GLUCOSE SERPL-MCNC: 132 MG/DL (ref 70–99)
GLUCOSE SERPL-MCNC: 133 MG/DL (ref 70–99)
GLUCOSE SERPL-MCNC: 148 MG/DL (ref 70–99)
GLUCOSE SERPL-MCNC: 157 MG/DL (ref 70–99)
GLUCOSE SERPL-MCNC: 173 MG/DL (ref 70–99)
GLUCOSE SERPL-MCNC: 191 MG/DL (ref 70–99)
GLUCOSE SERPL-MCNC: 194 MG/DL (ref 70–99)
GLUCOSE SERPL-MCNC: 236 MG/DL (ref 70–99)
GLUCOSE SERPL-MCNC: 69 MG/DL (ref 70–99)
GLUCOSE SERPL-MCNC: 92 MG/DL (ref 70–99)
GLUCOSE UR STRIP.AUTO-MCNC: NEGATIVE MG/DL
HBA1C MFR BLD: 5.3 %
HCO3 BLDA-SCNC: 21.1 MMOL/L (ref 21–29)
HCO3 BLDV-SCNC: 23 MMOL/L (ref 23–29)
HCT VFR BLD AUTO: 27.1 % (ref 40.5–52.5)
HCT VFR BLD AUTO: 27.6 % (ref 40.5–52.5)
HCT VFR BLD AUTO: 27.9 % (ref 40.5–52.5)
HCT VFR BLD AUTO: 28 % (ref 40.5–52.5)
HCT VFR BLD AUTO: 28 % (ref 40.5–52.5)
HCT VFR BLD AUTO: 28.3 % (ref 40.5–52.5)
HCT VFR BLD AUTO: 28.8 % (ref 40.5–52.5)
HCT VFR BLD AUTO: 29.2 % (ref 40.5–52.5)
HCT VFR BLD AUTO: 31.3 % (ref 40.5–52.5)
HCT VFR BLD AUTO: 31.8 % (ref 40.5–52.5)
HGB BLD-MCNC: 10 G/DL (ref 13.5–17.5)
HGB BLD-MCNC: 10 G/DL (ref 13.5–17.5)
HGB BLD-MCNC: 10.5 G/DL (ref 13.5–17.5)
HGB BLD-MCNC: 10.7 G/DL (ref 13.5–17.5)
HGB BLD-MCNC: 9.2 G/DL (ref 13.5–17.5)
HGB BLD-MCNC: 9.4 G/DL (ref 13.5–17.5)
HGB BLD-MCNC: 9.5 G/DL (ref 13.5–17.5)
HGB BLD-MCNC: 9.7 G/DL (ref 13.5–17.5)
HGB BLD-MCNC: 9.8 G/DL (ref 13.5–17.5)
HGB BLD-MCNC: 9.8 G/DL (ref 13.5–17.5)
HGB BLDA-MCNC: 11 G/DL (ref 13.5–17.5)
HGB UR QL STRIP.AUTO: ABNORMAL
HYALINE CASTS #/AREA URNS AUTO: 4 /LPF (ref 0–8)
INR PPP: 2 (ref 0.84–1.16)
IRON SATN MFR SERPL: 24 % (ref 20–50)
IRON SERPL-MCNC: 28 UG/DL (ref 59–158)
KETONES UR STRIP.AUTO-MCNC: NEGATIVE MG/DL
LACTATE BLDV-SCNC: 1.4 MMOL/L (ref 0.4–2)
LEUKOCYTE ESTERASE UR QL STRIP.AUTO: NEGATIVE
LV EF: 33 %
LVEF MODALITY: NORMAL
LYMPHOCYTES # BLD: 0.4 K/UL (ref 1–5.1)
LYMPHOCYTES # BLD: 0.5 K/UL (ref 1–5.1)
LYMPHOCYTES # BLD: 0.6 K/UL (ref 1–5.1)
LYMPHOCYTES # BLD: 0.7 K/UL (ref 1–5.1)
LYMPHOCYTES # BLD: 0.8 K/UL (ref 1–5.1)
LYMPHOCYTES # BLD: 1.5 K/UL (ref 1–5.1)
LYMPHOCYTES NFR BLD: 10.3 %
LYMPHOCYTES NFR BLD: 13 %
LYMPHOCYTES NFR BLD: 3.7 %
LYMPHOCYTES NFR BLD: 4.3 %
LYMPHOCYTES NFR BLD: 5.3 %
LYMPHOCYTES NFR BLD: 5.5 %
LYMPHOCYTES NFR BLD: 5.9 %
LYMPHOCYTES NFR BLD: 6.1 %
LYMPHOCYTES NFR BLD: 7.6 %
LYMPHOCYTES NFR BLD: 8.5 %
LYMPHOCYTES NFR BLD: 8.8 %
LYMPHOCYTES NFR BLD: 9 %
MAGNESIUM SERPL-MCNC: 1.5 MG/DL (ref 1.8–2.4)
MAGNESIUM SERPL-MCNC: 1.7 MG/DL (ref 1.8–2.4)
MAGNESIUM SERPL-MCNC: 1.8 MG/DL (ref 1.8–2.4)
MAGNESIUM SERPL-MCNC: 1.9 MG/DL (ref 1.8–2.4)
MAGNESIUM SERPL-MCNC: 1.9 MG/DL (ref 1.8–2.4)
MAGNESIUM SERPL-MCNC: 2 MG/DL (ref 1.8–2.4)
MAGNESIUM SERPL-MCNC: 2 MG/DL (ref 1.8–2.4)
MAGNESIUM SERPL-MCNC: 2.1 MG/DL (ref 1.8–2.4)
MAGNESIUM SERPL-MCNC: 2.3 MG/DL (ref 1.8–2.4)
MAGNESIUM SERPL-MCNC: 2.3 MG/DL (ref 1.8–2.4)
MCH RBC QN AUTO: 29.7 PG (ref 26–34)
MCH RBC QN AUTO: 30.1 PG (ref 26–34)
MCH RBC QN AUTO: 30.1 PG (ref 26–34)
MCH RBC QN AUTO: 30.3 PG (ref 26–34)
MCH RBC QN AUTO: 30.5 PG (ref 26–34)
MCH RBC QN AUTO: 30.7 PG (ref 26–34)
MCH RBC QN AUTO: 30.8 PG (ref 26–34)
MCH RBC QN AUTO: 31.1 PG (ref 26–34)
MCHC RBC AUTO-ENTMCNC: 33.5 G/DL (ref 31–36)
MCHC RBC AUTO-ENTMCNC: 33.5 G/DL (ref 31–36)
MCHC RBC AUTO-ENTMCNC: 33.6 G/DL (ref 31–36)
MCHC RBC AUTO-ENTMCNC: 33.6 G/DL (ref 31–36)
MCHC RBC AUTO-ENTMCNC: 33.7 G/DL (ref 31–36)
MCHC RBC AUTO-ENTMCNC: 33.8 G/DL (ref 31–36)
MCHC RBC AUTO-ENTMCNC: 34 G/DL (ref 31–36)
MCHC RBC AUTO-ENTMCNC: 34.1 G/DL (ref 31–36)
MCHC RBC AUTO-ENTMCNC: 34.1 G/DL (ref 31–36)
MCHC RBC AUTO-ENTMCNC: 34.2 G/DL (ref 31–36)
MCHC RBC AUTO-ENTMCNC: 34.2 G/DL (ref 31–36)
MCHC RBC AUTO-ENTMCNC: 34.6 G/DL (ref 31–36)
MCV RBC AUTO: 88.2 FL (ref 80–100)
MCV RBC AUTO: 88.6 FL (ref 80–100)
MCV RBC AUTO: 88.7 FL (ref 80–100)
MCV RBC AUTO: 88.8 FL (ref 80–100)
MCV RBC AUTO: 89.3 FL (ref 80–100)
MCV RBC AUTO: 89.5 FL (ref 80–100)
MCV RBC AUTO: 89.5 FL (ref 80–100)
MCV RBC AUTO: 90 FL (ref 80–100)
MCV RBC AUTO: 90.3 FL (ref 80–100)
MCV RBC AUTO: 90.4 FL (ref 80–100)
MCV RBC AUTO: 90.4 FL (ref 80–100)
MCV RBC AUTO: 91.6 FL (ref 80–100)
METHGB MFR BLDA: 0.8 %
METHGB MFR BLDV: 0.6 %
MONOCYTES # BLD: 0.3 K/UL (ref 0–1.3)
MONOCYTES # BLD: 0.4 K/UL (ref 0–1.3)
MONOCYTES # BLD: 0.5 K/UL (ref 0–1.3)
MONOCYTES # BLD: 0.8 K/UL (ref 0–1.3)
MONOCYTES # BLD: 0.9 K/UL (ref 0–1.3)
MONOCYTES # BLD: 0.9 K/UL (ref 0–1.3)
MONOCYTES # BLD: 1 K/UL (ref 0–1.3)
MONOCYTES # BLD: 1.2 K/UL (ref 0–1.3)
MONOCYTES # BLD: 1.2 K/UL (ref 0–1.3)
MONOCYTES # BLD: 1.3 K/UL (ref 0–1.3)
MONOCYTES # BLD: 1.3 K/UL (ref 0–1.3)
MONOCYTES # BLD: 1.4 K/UL (ref 0–1.3)
MONOCYTES NFR BLD: 10.2 %
MONOCYTES NFR BLD: 10.6 %
MONOCYTES NFR BLD: 13.1 %
MONOCYTES NFR BLD: 18 %
MONOCYTES NFR BLD: 18.4 %
MONOCYTES NFR BLD: 18.9 %
MONOCYTES NFR BLD: 5.1 %
MONOCYTES NFR BLD: 5.8 %
MONOCYTES NFR BLD: 6.3 %
MONOCYTES NFR BLD: 6.4 %
MONOCYTES NFR BLD: 6.6 %
MONOCYTES NFR BLD: 7 %
MUCUS: PRESENT
NEUTROPHILS # BLD: 12.8 K/UL (ref 1.7–7.7)
NEUTROPHILS # BLD: 18.7 K/UL (ref 1.7–7.7)
NEUTROPHILS # BLD: 4.2 K/UL (ref 1.7–7.7)
NEUTROPHILS # BLD: 4.5 K/UL (ref 1.7–7.7)
NEUTROPHILS # BLD: 4.6 K/UL (ref 1.7–7.7)
NEUTROPHILS # BLD: 5.1 K/UL (ref 1.7–7.7)
NEUTROPHILS # BLD: 5.2 K/UL (ref 1.7–7.7)
NEUTROPHILS # BLD: 7.2 K/UL (ref 1.7–7.7)
NEUTROPHILS # BLD: 7.3 K/UL (ref 1.7–7.7)
NEUTROPHILS # BLD: 8.3 K/UL (ref 1.7–7.7)
NEUTROPHILS # BLD: 8.3 K/UL (ref 1.7–7.7)
NEUTROPHILS # BLD: 8.9 K/UL (ref 1.7–7.7)
NEUTROPHILS NFR BLD: 68.4 %
NEUTROPHILS NFR BLD: 69.6 %
NEUTROPHILS NFR BLD: 69.6 %
NEUTROPHILS NFR BLD: 75 %
NEUTROPHILS NFR BLD: 81.5 %
NEUTROPHILS NFR BLD: 83.4 %
NEUTROPHILS NFR BLD: 84.2 %
NEUTROPHILS NFR BLD: 84.5 %
NEUTROPHILS NFR BLD: 85.7 %
NEUTROPHILS NFR BLD: 87.5 %
NEUTROPHILS NFR BLD: 88.9 %
NEUTROPHILS NFR BLD: 89.9 %
NITRITE UR QL STRIP.AUTO: NEGATIVE
NT-PROBNP SERPL-MCNC: ABNORMAL PG/ML (ref 0–449)
O2 THERAPY: ABNORMAL
O2 THERAPY: NORMAL
OVALOCYTES BLD QL SMEAR: ABNORMAL
PATH INTERP BLD-IMP: NO
PCO2 BLDA: 34.6 MMHG (ref 35–45)
PCO2 BLDV: 40.5 MMHG (ref 40–50)
PERFORMED ON: ABNORMAL
PERFORMED ON: NORMAL
PH BLDA: 7.39 [PH] (ref 7.35–7.45)
PH BLDV: 7.36 [PH] (ref 7.35–7.45)
PH UR STRIP.AUTO: 5.5 [PH] (ref 5–8)
PLATELET # BLD AUTO: 103 K/UL (ref 135–450)
PLATELET # BLD AUTO: 103 K/UL (ref 135–450)
PLATELET # BLD AUTO: 108 K/UL (ref 135–450)
PLATELET # BLD AUTO: 108 K/UL (ref 135–450)
PLATELET # BLD AUTO: 126 K/UL (ref 135–450)
PLATELET # BLD AUTO: 84 K/UL (ref 135–450)
PLATELET # BLD AUTO: 90 K/UL (ref 135–450)
PLATELET # BLD AUTO: 91 K/UL (ref 135–450)
PLATELET # BLD AUTO: 92 K/UL (ref 135–450)
PLATELET # BLD AUTO: 95 K/UL (ref 135–450)
PLATELET # BLD AUTO: 97 K/UL (ref 135–450)
PLATELET # BLD AUTO: 99 K/UL (ref 135–450)
PLATELET BLD QL SMEAR: ABNORMAL
PLATELET BLD QL SMEAR: ABNORMAL
PMV BLD AUTO: 10 FL (ref 5–10.5)
PMV BLD AUTO: 10 FL (ref 5–10.5)
PMV BLD AUTO: 10.2 FL (ref 5–10.5)
PMV BLD AUTO: 10.3 FL (ref 5–10.5)
PMV BLD AUTO: 10.4 FL (ref 5–10.5)
PMV BLD AUTO: 10.7 FL (ref 5–10.5)
PMV BLD AUTO: 9.2 FL (ref 5–10.5)
PMV BLD AUTO: 9.6 FL (ref 5–10.5)
PMV BLD AUTO: 9.7 FL (ref 5–10.5)
PMV BLD AUTO: 9.9 FL (ref 5–10.5)
PO2 BLDA: 167 MMHG (ref 75–108)
PO2 BLDV: 43 MMHG
POIKILOCYTOSIS BLD QL SMEAR: ABNORMAL
POIKILOCYTOSIS BLD QL SMEAR: ABNORMAL
POTASSIUM SERPL-SCNC: 3 MMOL/L (ref 3.5–5.1)
POTASSIUM SERPL-SCNC: 3.1 MMOL/L (ref 3.5–5.1)
POTASSIUM SERPL-SCNC: 3.3 MMOL/L (ref 3.5–5.1)
POTASSIUM SERPL-SCNC: 3.4 MMOL/L (ref 3.5–5.1)
POTASSIUM SERPL-SCNC: 3.6 MMOL/L (ref 3.5–5.1)
POTASSIUM SERPL-SCNC: 3.6 MMOL/L (ref 3.5–5.1)
POTASSIUM SERPL-SCNC: 3.8 MMOL/L (ref 3.5–5.1)
POTASSIUM SERPL-SCNC: 3.9 MMOL/L (ref 3.5–5.1)
POTASSIUM SERPL-SCNC: 4 MMOL/L (ref 3.5–5.1)
PROCALCITONIN SERPL IA-MCNC: 0.17 NG/ML (ref 0–0.15)
PROCALCITONIN SERPL IA-MCNC: 0.21 NG/ML (ref 0–0.15)
PROCALCITONIN SERPL IA-MCNC: 0.25 NG/ML (ref 0–0.15)
PROCALCITONIN SERPL IA-MCNC: 0.28 NG/ML (ref 0–0.15)
PROT SERPL-MCNC: 5.3 G/DL (ref 6.4–8.2)
PROT SERPL-MCNC: 5.7 G/DL (ref 6.4–8.2)
PROT UR STRIP.AUTO-MCNC: 30 MG/DL
PROTHROMBIN TIME: 22.6 SEC (ref 11.5–14.8)
RBC # BLD AUTO: 3.02 M/UL (ref 4.2–5.9)
RBC # BLD AUTO: 3.05 M/UL (ref 4.2–5.9)
RBC # BLD AUTO: 3.09 M/UL (ref 4.2–5.9)
RBC # BLD AUTO: 3.13 M/UL (ref 4.2–5.9)
RBC # BLD AUTO: 3.16 M/UL (ref 4.2–5.9)
RBC # BLD AUTO: 3.19 M/UL (ref 4.2–5.9)
RBC # BLD AUTO: 3.2 M/UL (ref 4.2–5.9)
RBC # BLD AUTO: 3.23 M/UL (ref 4.2–5.9)
RBC # BLD AUTO: 3.27 M/UL (ref 4.2–5.9)
RBC # BLD AUTO: 3.3 M/UL (ref 4.2–5.9)
RBC # BLD AUTO: 3.47 M/UL (ref 4.2–5.9)
RBC # BLD AUTO: 3.55 M/UL (ref 4.2–5.9)
RBC CLUMPS #/AREA URNS AUTO: 14 /HPF (ref 0–4)
SAO2 % BLDA: >100 %
SAO2 % BLDV: 77 %
SARS-COV-2 RDRP RESP QL NAA+PROBE: NOT DETECTED
SLIDE REVIEW: ABNORMAL
SODIUM SERPL-SCNC: 127 MMOL/L (ref 136–145)
SODIUM SERPL-SCNC: 129 MMOL/L (ref 136–145)
SODIUM SERPL-SCNC: 131 MMOL/L (ref 136–145)
SODIUM SERPL-SCNC: 132 MMOL/L (ref 136–145)
SODIUM SERPL-SCNC: 132 MMOL/L (ref 136–145)
SODIUM SERPL-SCNC: 133 MMOL/L (ref 136–145)
SODIUM SERPL-SCNC: 134 MMOL/L (ref 136–145)
SODIUM SERPL-SCNC: 136 MMOL/L (ref 136–145)
SODIUM SERPL-SCNC: 136 MMOL/L (ref 136–145)
SODIUM SERPL-SCNC: 137 MMOL/L (ref 136–145)
SP GR UR STRIP.AUTO: 1.01 (ref 1–1.03)
TIBC SERPL-MCNC: 119 UG/DL (ref 260–445)
TROPONIN, HIGH SENSITIVITY: 85 NG/L (ref 0–22)
UA DIPSTICK W REFLEX MICRO PNL UR: YES
URN SPEC COLLECT METH UR: ABNORMAL
UROBILINOGEN UR STRIP-ACNC: 0.2 E.U./DL
VIT B12 SERPL-MCNC: 1710 PG/ML (ref 211–911)
WBC # BLD AUTO: 10.2 K/UL (ref 4–11)
WBC # BLD AUTO: 11.8 K/UL (ref 4–11)
WBC # BLD AUTO: 14.4 K/UL (ref 4–11)
WBC # BLD AUTO: 20.8 K/UL (ref 4–11)
WBC # BLD AUTO: 5 K/UL (ref 4–11)
WBC # BLD AUTO: 5.4 K/UL (ref 4–11)
WBC # BLD AUTO: 6.6 K/UL (ref 4–11)
WBC # BLD AUTO: 7.4 K/UL (ref 4–11)
WBC # BLD AUTO: 7.5 K/UL (ref 4–11)
WBC # BLD AUTO: 8.3 K/UL (ref 4–11)
WBC # BLD AUTO: 8.6 K/UL (ref 4–11)
WBC # BLD AUTO: 9.8 K/UL (ref 4–11)
WBC #/AREA URNS AUTO: 1 /HPF (ref 0–5)

## 2023-01-01 PROCEDURE — 2580000003 HC RX 258: Performed by: FAMILY MEDICINE

## 2023-01-01 PROCEDURE — 6370000000 HC RX 637 (ALT 250 FOR IP): Performed by: STUDENT IN AN ORGANIZED HEALTH CARE EDUCATION/TRAINING PROGRAM

## 2023-01-01 PROCEDURE — 84145 PROCALCITONIN (PCT): CPT

## 2023-01-01 PROCEDURE — 80048 BASIC METABOLIC PNL TOTAL CA: CPT

## 2023-01-01 PROCEDURE — 93010 ELECTROCARDIOGRAM REPORT: CPT | Performed by: INTERNAL MEDICINE

## 2023-01-01 PROCEDURE — 2060000000 HC ICU INTERMEDIATE R&B

## 2023-01-01 PROCEDURE — 71045 X-RAY EXAM CHEST 1 VIEW: CPT

## 2023-01-01 PROCEDURE — 99232 SBSQ HOSP IP/OBS MODERATE 35: CPT | Performed by: NURSE PRACTITIONER

## 2023-01-01 PROCEDURE — 97163 PT EVAL HIGH COMPLEX 45 MIN: CPT

## 2023-01-01 PROCEDURE — 74230 X-RAY XM SWLNG FUNCJ C+: CPT

## 2023-01-01 PROCEDURE — 6370000000 HC RX 637 (ALT 250 FOR IP): Performed by: INTERNAL MEDICINE

## 2023-01-01 PROCEDURE — 6360000002 HC RX W HCPCS: Performed by: NURSE PRACTITIONER

## 2023-01-01 PROCEDURE — 2580000003 HC RX 258: Performed by: STUDENT IN AN ORGANIZED HEALTH CARE EDUCATION/TRAINING PROGRAM

## 2023-01-01 PROCEDURE — 9990000010 HC NO CHARGE VISIT

## 2023-01-01 PROCEDURE — 83735 ASSAY OF MAGNESIUM: CPT

## 2023-01-01 PROCEDURE — 94761 N-INVAS EAR/PLS OXIMETRY MLT: CPT

## 2023-01-01 PROCEDURE — 36415 COLL VENOUS BLD VENIPUNCTURE: CPT

## 2023-01-01 PROCEDURE — 6360000002 HC RX W HCPCS: Performed by: INTERNAL MEDICINE

## 2023-01-01 PROCEDURE — 6360000002 HC RX W HCPCS: Performed by: FAMILY MEDICINE

## 2023-01-01 PROCEDURE — 2700000000 HC OXYGEN THERAPY PER DAY

## 2023-01-01 PROCEDURE — 99233 SBSQ HOSP IP/OBS HIGH 50: CPT | Performed by: INTERNAL MEDICINE

## 2023-01-01 PROCEDURE — 83880 ASSAY OF NATRIURETIC PEPTIDE: CPT

## 2023-01-01 PROCEDURE — 6360000002 HC RX W HCPCS: Performed by: STUDENT IN AN ORGANIZED HEALTH CARE EDUCATION/TRAINING PROGRAM

## 2023-01-01 PROCEDURE — 82746 ASSAY OF FOLIC ACID SERUM: CPT

## 2023-01-01 PROCEDURE — 92526 ORAL FUNCTION THERAPY: CPT

## 2023-01-01 PROCEDURE — 83036 HEMOGLOBIN GLYCOSYLATED A1C: CPT

## 2023-01-01 PROCEDURE — 94760 N-INVAS EAR/PLS OXIMETRY 1: CPT

## 2023-01-01 PROCEDURE — 6370000000 HC RX 637 (ALT 250 FOR IP): Performed by: NURSE PRACTITIONER

## 2023-01-01 PROCEDURE — 96374 THER/PROPH/DIAG INJ IV PUSH: CPT

## 2023-01-01 PROCEDURE — 97129 THER IVNTJ 1ST 15 MIN: CPT

## 2023-01-01 PROCEDURE — 87804 INFLUENZA ASSAY W/OPTIC: CPT

## 2023-01-01 PROCEDURE — 71250 CT THORAX DX C-: CPT

## 2023-01-01 PROCEDURE — 94660 CPAP INITIATION&MGMT: CPT

## 2023-01-01 PROCEDURE — 85025 COMPLETE CBC W/AUTO DIFF WBC: CPT

## 2023-01-01 PROCEDURE — 81001 URINALYSIS AUTO W/SCOPE: CPT

## 2023-01-01 PROCEDURE — 84484 ASSAY OF TROPONIN QUANT: CPT

## 2023-01-01 PROCEDURE — 82607 VITAMIN B-12: CPT

## 2023-01-01 PROCEDURE — 6370000000 HC RX 637 (ALT 250 FOR IP): Performed by: FAMILY MEDICINE

## 2023-01-01 PROCEDURE — 92611 MOTION FLUOROSCOPY/SWALLOW: CPT

## 2023-01-01 PROCEDURE — 99232 SBSQ HOSP IP/OBS MODERATE 35: CPT | Performed by: INTERNAL MEDICINE

## 2023-01-01 PROCEDURE — 97167 OT EVAL HIGH COMPLEX 60 MIN: CPT

## 2023-01-01 PROCEDURE — 87635 SARS-COV-2 COVID-19 AMP PRB: CPT

## 2023-01-01 PROCEDURE — 6360000002 HC RX W HCPCS: Performed by: PHYSICIAN ASSISTANT

## 2023-01-01 PROCEDURE — 36600 WITHDRAWAL OF ARTERIAL BLOOD: CPT

## 2023-01-01 PROCEDURE — C8929 TTE W OR WO FOL WCON,DOPPLER: HCPCS

## 2023-01-01 PROCEDURE — 86140 C-REACTIVE PROTEIN: CPT

## 2023-01-01 PROCEDURE — 85610 PROTHROMBIN TIME: CPT

## 2023-01-01 PROCEDURE — 1200000000 HC SEMI PRIVATE

## 2023-01-01 PROCEDURE — 83550 IRON BINDING TEST: CPT

## 2023-01-01 PROCEDURE — 99285 EMERGENCY DEPT VISIT HI MDM: CPT

## 2023-01-01 PROCEDURE — 82803 BLOOD GASES ANY COMBINATION: CPT

## 2023-01-01 PROCEDURE — 97530 THERAPEUTIC ACTIVITIES: CPT

## 2023-01-01 PROCEDURE — 83540 ASSAY OF IRON: CPT

## 2023-01-01 PROCEDURE — 51798 US URINE CAPACITY MEASURE: CPT

## 2023-01-01 PROCEDURE — 93005 ELECTROCARDIOGRAM TRACING: CPT | Performed by: EMERGENCY MEDICINE

## 2023-01-01 PROCEDURE — 93306 TTE W/DOPPLER COMPLETE: CPT

## 2023-01-01 PROCEDURE — 99223 1ST HOSP IP/OBS HIGH 75: CPT | Performed by: INTERNAL MEDICINE

## 2023-01-01 PROCEDURE — 6370000000 HC RX 637 (ALT 250 FOR IP): Performed by: PHYSICIAN ASSISTANT

## 2023-01-01 PROCEDURE — 93283 PRGRMG EVAL IMPLANTABLE DFB: CPT | Performed by: INTERNAL MEDICINE

## 2023-01-01 PROCEDURE — 92610 EVALUATE SWALLOWING FUNCTION: CPT

## 2023-01-01 PROCEDURE — 82728 ASSAY OF FERRITIN: CPT

## 2023-01-01 PROCEDURE — 83605 ASSAY OF LACTIC ACID: CPT

## 2023-01-01 PROCEDURE — 97130 THER IVNTJ EA ADDL 15 MIN: CPT

## 2023-01-01 PROCEDURE — 80053 COMPREHEN METABOLIC PANEL: CPT

## 2023-01-01 RX ORDER — ACETAMINOPHEN 650 MG/1
650 SUPPOSITORY RECTAL EVERY 6 HOURS PRN
Status: DISCONTINUED | OUTPATIENT
Start: 2023-01-01 | End: 2023-01-01 | Stop reason: HOSPADM

## 2023-01-01 RX ORDER — MAGNESIUM SULFATE IN WATER 40 MG/ML
2000 INJECTION, SOLUTION INTRAVENOUS ONCE
Status: COMPLETED | OUTPATIENT
Start: 2023-01-01 | End: 2023-01-01

## 2023-01-01 RX ORDER — SODIUM CHLORIDE 0.9 % (FLUSH) 0.9 %
5-40 SYRINGE (ML) INJECTION PRN
Status: DISCONTINUED | OUTPATIENT
Start: 2023-01-01 | End: 2023-01-01 | Stop reason: HOSPADM

## 2023-01-01 RX ORDER — FUROSEMIDE 40 MG/1
80 TABLET ORAL 2 TIMES DAILY
Status: DISCONTINUED | OUTPATIENT
Start: 2023-01-01 | End: 2023-01-01

## 2023-01-01 RX ORDER — LIDOCAINE HYDROCHLORIDE 20 MG/ML
JELLY TOPICAL PRN
Status: DISCONTINUED | OUTPATIENT
Start: 2023-01-01 | End: 2023-01-01

## 2023-01-01 RX ORDER — INSULIN LISPRO 100 [IU]/ML
0-4 INJECTION, SOLUTION INTRAVENOUS; SUBCUTANEOUS
Status: DISCONTINUED | OUTPATIENT
Start: 2023-01-01 | End: 2023-01-01

## 2023-01-01 RX ORDER — MORPHINE SULFATE 2 MG/ML
2 INJECTION, SOLUTION INTRAMUSCULAR; INTRAVENOUS EVERY 4 HOURS PRN
Status: DISCONTINUED | OUTPATIENT
Start: 2023-01-01 | End: 2023-01-01

## 2023-01-01 RX ORDER — POLYETHYLENE GLYCOL 3350 17 G/17G
17 POWDER, FOR SOLUTION ORAL DAILY
Status: DISCONTINUED | OUTPATIENT
Start: 2023-01-01 | End: 2023-01-01

## 2023-01-01 RX ORDER — FUROSEMIDE 40 MG/1
40 TABLET ORAL 2 TIMES DAILY
Status: DISCONTINUED | OUTPATIENT
Start: 2023-01-01 | End: 2023-01-01

## 2023-01-01 RX ORDER — SODIUM CHLORIDE 0.9 % (FLUSH) 0.9 %
5-40 SYRINGE (ML) INJECTION EVERY 12 HOURS SCHEDULED
Status: DISCONTINUED | OUTPATIENT
Start: 2023-01-01 | End: 2023-01-01

## 2023-01-01 RX ORDER — METHYLPREDNISOLONE SODIUM SUCCINATE 40 MG/ML
40 INJECTION, POWDER, LYOPHILIZED, FOR SOLUTION INTRAMUSCULAR; INTRAVENOUS EVERY 8 HOURS
Status: DISCONTINUED | OUTPATIENT
Start: 2023-01-01 | End: 2023-01-01

## 2023-01-01 RX ORDER — POLYETHYLENE GLYCOL 3350 17 G/17G
17 POWDER, FOR SOLUTION ORAL DAILY
COMMUNITY

## 2023-01-01 RX ORDER — ACETAMINOPHEN 325 MG/1
650 TABLET ORAL EVERY 4 HOURS PRN
COMMUNITY

## 2023-01-01 RX ORDER — NITROGLYCERIN 0.4 MG/1
0.4 TABLET SUBLINGUAL EVERY 5 MIN PRN
Status: DISCONTINUED | OUTPATIENT
Start: 2023-01-01 | End: 2023-01-01

## 2023-01-01 RX ORDER — POTASSIUM CHLORIDE 7.45 MG/ML
10 INJECTION INTRAVENOUS
Status: COMPLETED | OUTPATIENT
Start: 2023-01-01 | End: 2023-01-01

## 2023-01-01 RX ORDER — PANTOPRAZOLE SODIUM 40 MG/1
40 TABLET, DELAYED RELEASE ORAL
Status: DISCONTINUED | OUTPATIENT
Start: 2023-01-01 | End: 2023-01-01

## 2023-01-01 RX ORDER — FUROSEMIDE 10 MG/ML
40 INJECTION INTRAMUSCULAR; INTRAVENOUS 2 TIMES DAILY
Status: DISCONTINUED | OUTPATIENT
Start: 2023-01-01 | End: 2023-01-01

## 2023-01-01 RX ORDER — TAMSULOSIN HYDROCHLORIDE 0.4 MG/1
0.4 CAPSULE ORAL DAILY
Status: DISCONTINUED | OUTPATIENT
Start: 2023-01-01 | End: 2023-01-01

## 2023-01-01 RX ORDER — LANSOPRAZOLE 30 MG/1
30 TABLET, ORALLY DISINTEGRATING, DELAYED RELEASE ORAL
Status: DISCONTINUED | OUTPATIENT
Start: 2023-01-01 | End: 2023-01-01

## 2023-01-01 RX ORDER — ONDANSETRON 2 MG/ML
4 INJECTION INTRAMUSCULAR; INTRAVENOUS EVERY 6 HOURS PRN
Status: DISCONTINUED | OUTPATIENT
Start: 2023-01-01 | End: 2023-01-01

## 2023-01-01 RX ORDER — POTASSIUM CHLORIDE 20 MEQ/1
40 TABLET, EXTENDED RELEASE ORAL ONCE
Status: COMPLETED | OUTPATIENT
Start: 2023-01-01 | End: 2023-01-01

## 2023-01-01 RX ORDER — ASCORBIC ACID 500 MG
500 TABLET ORAL DAILY
COMMUNITY

## 2023-01-01 RX ORDER — ONDANSETRON 4 MG/1
4 TABLET, ORALLY DISINTEGRATING ORAL EVERY 8 HOURS PRN
Status: DISCONTINUED | OUTPATIENT
Start: 2023-01-01 | End: 2023-01-01

## 2023-01-01 RX ORDER — NYSTATIN 100000 U/G
1 CREAM TOPICAL 2 TIMES DAILY
COMMUNITY

## 2023-01-01 RX ORDER — POTASSIUM CHLORIDE 20 MEQ/1
60 TABLET, EXTENDED RELEASE ORAL ONCE
Status: COMPLETED | OUTPATIENT
Start: 2023-01-01 | End: 2023-01-01

## 2023-01-01 RX ORDER — FUROSEMIDE 10 MG/ML
40 INJECTION INTRAMUSCULAR; INTRAVENOUS ONCE
Status: COMPLETED | OUTPATIENT
Start: 2023-01-01 | End: 2023-01-01

## 2023-01-01 RX ORDER — CARVEDILOL 6.25 MG/1
6.25 TABLET ORAL 2 TIMES DAILY WITH MEALS
Status: DISCONTINUED | OUTPATIENT
Start: 2023-01-01 | End: 2023-01-01

## 2023-01-01 RX ORDER — AMIODARONE HYDROCHLORIDE 200 MG/1
100 TABLET ORAL DAILY
Status: DISCONTINUED | OUTPATIENT
Start: 2023-01-01 | End: 2023-01-01

## 2023-01-01 RX ORDER — POTASSIUM CHLORIDE 750 MG/1
20 TABLET, FILM COATED, EXTENDED RELEASE ORAL ONCE
Status: COMPLETED | OUTPATIENT
Start: 2023-01-01 | End: 2023-01-01

## 2023-01-01 RX ORDER — SODIUM CHLORIDE 1000 MG
1 TABLET, SOLUBLE MISCELLANEOUS 2 TIMES DAILY
COMMUNITY

## 2023-01-01 RX ORDER — INSULIN LISPRO 100 [IU]/ML
0-4 INJECTION, SOLUTION INTRAVENOUS; SUBCUTANEOUS NIGHTLY
Status: DISCONTINUED | OUTPATIENT
Start: 2023-01-01 | End: 2023-01-01

## 2023-01-01 RX ORDER — CARVEDILOL 12.5 MG/1
12.5 TABLET ORAL 2 TIMES DAILY WITH MEALS
Status: DISCONTINUED | OUTPATIENT
Start: 2023-01-01 | End: 2023-01-01

## 2023-01-01 RX ORDER — SODIUM CHLORIDE 9 MG/ML
INJECTION, SOLUTION INTRAVENOUS PRN
Status: DISCONTINUED | OUTPATIENT
Start: 2023-01-01 | End: 2023-01-01 | Stop reason: HOSPADM

## 2023-01-01 RX ORDER — ACETAMINOPHEN 160 MG
1 TABLET,DISINTEGRATING ORAL DAILY
COMMUNITY

## 2023-01-01 RX ORDER — ACETAZOLAMIDE 250 MG/1
125 TABLET ORAL ONCE
Status: COMPLETED | OUTPATIENT
Start: 2023-01-01 | End: 2023-01-01

## 2023-01-01 RX ORDER — INSULIN GLARGINE 100 [IU]/ML
3 INJECTION, SOLUTION SUBCUTANEOUS NIGHTLY
Status: DISCONTINUED | OUTPATIENT
Start: 2023-01-01 | End: 2023-01-01

## 2023-01-01 RX ORDER — AMIODARONE HYDROCHLORIDE 200 MG/1
200 TABLET ORAL DAILY
Status: DISCONTINUED | OUTPATIENT
Start: 2023-01-01 | End: 2023-01-01

## 2023-01-01 RX ORDER — METHYLPREDNISOLONE SODIUM SUCCINATE 125 MG/2ML
40 INJECTION, POWDER, LYOPHILIZED, FOR SOLUTION INTRAMUSCULAR; INTRAVENOUS EVERY 8 HOURS
Status: COMPLETED | OUTPATIENT
Start: 2023-01-01 | End: 2023-01-01

## 2023-01-01 RX ORDER — LORAZEPAM 2 MG/ML
1 INJECTION INTRAMUSCULAR
Status: DISCONTINUED | OUTPATIENT
Start: 2023-01-01 | End: 2023-01-01 | Stop reason: HOSPADM

## 2023-01-01 RX ORDER — AMIODARONE HYDROCHLORIDE 200 MG/1
200 TABLET ORAL DAILY
COMMUNITY

## 2023-01-01 RX ORDER — HYDROXYZINE HYDROCHLORIDE 25 MG/1
25 TABLET, FILM COATED ORAL EVERY 8 HOURS PRN
COMMUNITY

## 2023-01-01 RX ORDER — DEXTROSE MONOHYDRATE 100 MG/ML
INJECTION, SOLUTION INTRAVENOUS CONTINUOUS PRN
Status: DISCONTINUED | OUTPATIENT
Start: 2023-01-01 | End: 2023-01-01

## 2023-01-01 RX ORDER — MORPHINE SULFATE 2 MG/ML
2 INJECTION, SOLUTION INTRAMUSCULAR; INTRAVENOUS
Status: DISCONTINUED | OUTPATIENT
Start: 2023-01-01 | End: 2023-01-01 | Stop reason: HOSPADM

## 2023-01-01 RX ORDER — ATORVASTATIN CALCIUM 40 MG/1
40 TABLET, FILM COATED ORAL DAILY
Status: DISCONTINUED | OUTPATIENT
Start: 2023-01-01 | End: 2023-01-01

## 2023-01-01 RX ORDER — ACETAMINOPHEN 325 MG/1
650 TABLET ORAL EVERY 6 HOURS PRN
Status: DISCONTINUED | OUTPATIENT
Start: 2023-01-01 | End: 2023-01-01

## 2023-01-01 RX ORDER — NICOTINE 14MG/24HR
1 PATCH, TRANSDERMAL 24 HOURS TRANSDERMAL 2 TIMES DAILY
COMMUNITY

## 2023-01-01 RX ADMIN — APIXABAN 2.5 MG: 2.5 TABLET, FILM COATED ORAL at 21:02

## 2023-01-01 RX ADMIN — AMPICILLIN SODIUM AND SULBACTAM SODIUM 3000 MG: 2; 1 INJECTION, POWDER, FOR SOLUTION INTRAMUSCULAR; INTRAVENOUS at 13:57

## 2023-01-01 RX ADMIN — AMIODARONE HYDROCHLORIDE 100 MG: 200 TABLET ORAL at 09:17

## 2023-01-01 RX ADMIN — CARVEDILOL 6.25 MG: 6.25 TABLET, FILM COATED ORAL at 09:29

## 2023-01-01 RX ADMIN — CARVEDILOL 12.5 MG: 12.5 TABLET, FILM COATED ORAL at 18:41

## 2023-01-01 RX ADMIN — POLYETHYLENE GLYCOL 3350 17 G: 17 POWDER, FOR SOLUTION ORAL at 09:29

## 2023-01-01 RX ADMIN — ATORVASTATIN CALCIUM 40 MG: 40 TABLET, FILM COATED ORAL at 09:55

## 2023-01-01 RX ADMIN — SODIUM CHLORIDE, PRESERVATIVE FREE 10 ML: 5 INJECTION INTRAVENOUS at 09:44

## 2023-01-01 RX ADMIN — FUROSEMIDE 40 MG: 10 INJECTION, SOLUTION INTRAMUSCULAR; INTRAVENOUS at 17:15

## 2023-01-01 RX ADMIN — SODIUM CHLORIDE, PRESERVATIVE FREE 10 ML: 5 INJECTION INTRAVENOUS at 09:35

## 2023-01-01 RX ADMIN — APIXABAN 2.5 MG: 2.5 TABLET, FILM COATED ORAL at 09:24

## 2023-01-01 RX ADMIN — FUROSEMIDE 40 MG: 40 TABLET ORAL at 08:23

## 2023-01-01 RX ADMIN — CARVEDILOL 12.5 MG: 12.5 TABLET, FILM COATED ORAL at 10:12

## 2023-01-01 RX ADMIN — APIXABAN 2.5 MG: 2.5 TABLET, FILM COATED ORAL at 20:31

## 2023-01-01 RX ADMIN — INSULIN LISPRO 1 UNITS: 100 INJECTION, SOLUTION INTRAVENOUS; SUBCUTANEOUS at 17:03

## 2023-01-01 RX ADMIN — ATORVASTATIN CALCIUM 40 MG: 40 TABLET, FILM COATED ORAL at 08:54

## 2023-01-01 RX ADMIN — SODIUM CHLORIDE, PRESERVATIVE FREE 10 ML: 5 INJECTION INTRAVENOUS at 20:48

## 2023-01-01 RX ADMIN — POLYETHYLENE GLYCOL 3350 17 G: 17 POWDER, FOR SOLUTION ORAL at 09:55

## 2023-01-01 RX ADMIN — FUROSEMIDE 40 MG: 10 INJECTION, SOLUTION INTRAMUSCULAR; INTRAVENOUS at 09:39

## 2023-01-01 RX ADMIN — APIXABAN 2.5 MG: 2.5 TABLET, FILM COATED ORAL at 08:23

## 2023-01-01 RX ADMIN — AMPICILLIN SODIUM AND SULBACTAM SODIUM 3000 MG: 2; 1 INJECTION, POWDER, FOR SOLUTION INTRAMUSCULAR; INTRAVENOUS at 01:10

## 2023-01-01 RX ADMIN — APIXABAN 2.5 MG: 2.5 TABLET, FILM COATED ORAL at 20:57

## 2023-01-01 RX ADMIN — POLYETHYLENE GLYCOL 3350 17 G: 17 POWDER, FOR SOLUTION ORAL at 08:55

## 2023-01-01 RX ADMIN — TAMSULOSIN HYDROCHLORIDE 0.4 MG: 0.4 CAPSULE ORAL at 08:23

## 2023-01-01 RX ADMIN — MORPHINE SULFATE 2 MG: 2 INJECTION, SOLUTION INTRAMUSCULAR; INTRAVENOUS at 18:57

## 2023-01-01 RX ADMIN — FUROSEMIDE 40 MG: 40 TABLET ORAL at 18:27

## 2023-01-01 RX ADMIN — POTASSIUM CHLORIDE 40 MEQ: 1500 TABLET, EXTENDED RELEASE ORAL at 13:02

## 2023-01-01 RX ADMIN — APIXABAN 2.5 MG: 2.5 TABLET, FILM COATED ORAL at 21:03

## 2023-01-01 RX ADMIN — FUROSEMIDE 40 MG: 10 INJECTION, SOLUTION INTRAMUSCULAR; INTRAVENOUS at 19:07

## 2023-01-01 RX ADMIN — SODIUM CHLORIDE, PRESERVATIVE FREE 10 ML: 5 INJECTION INTRAVENOUS at 20:39

## 2023-01-01 RX ADMIN — CARVEDILOL 6.25 MG: 6.25 TABLET, FILM COATED ORAL at 17:13

## 2023-01-01 RX ADMIN — CARVEDILOL 6.25 MG: 6.25 TABLET, FILM COATED ORAL at 17:38

## 2023-01-01 RX ADMIN — POTASSIUM CHLORIDE 20 MEQ: 750 TABLET, FILM COATED, EXTENDED RELEASE ORAL at 14:41

## 2023-01-01 RX ADMIN — AMIODARONE HYDROCHLORIDE 100 MG: 200 TABLET ORAL at 10:16

## 2023-01-01 RX ADMIN — APIXABAN 2.5 MG: 2.5 TABLET, FILM COATED ORAL at 21:56

## 2023-01-01 RX ADMIN — AMPICILLIN SODIUM AND SULBACTAM SODIUM 3000 MG: 2; 1 INJECTION, POWDER, FOR SOLUTION INTRAMUSCULAR; INTRAVENOUS at 00:34

## 2023-01-01 RX ADMIN — LANSOPRAZOLE 30 MG: 30 TABLET, ORALLY DISINTEGRATING, DELAYED RELEASE ORAL at 05:28

## 2023-01-01 RX ADMIN — SODIUM CHLORIDE, PRESERVATIVE FREE 10 ML: 5 INJECTION INTRAVENOUS at 21:23

## 2023-01-01 RX ADMIN — INSULIN LISPRO 1 UNITS: 100 INJECTION, SOLUTION INTRAVENOUS; SUBCUTANEOUS at 09:44

## 2023-01-01 RX ADMIN — APIXABAN 2.5 MG: 2.5 TABLET, FILM COATED ORAL at 20:39

## 2023-01-01 RX ADMIN — FUROSEMIDE 40 MG: 10 INJECTION, SOLUTION INTRAMUSCULAR; INTRAVENOUS at 09:24

## 2023-01-01 RX ADMIN — POLYETHYLENE GLYCOL 3350 17 G: 17 POWDER, FOR SOLUTION ORAL at 09:26

## 2023-01-01 RX ADMIN — LORAZEPAM 1 MG: 2 INJECTION INTRAMUSCULAR; INTRAVENOUS at 13:10

## 2023-01-01 RX ADMIN — LORAZEPAM 1 MG: 2 INJECTION INTRAMUSCULAR; INTRAVENOUS at 20:43

## 2023-01-01 RX ADMIN — ACETAMINOPHEN 650 MG: 325 TABLET ORAL at 15:04

## 2023-01-01 RX ADMIN — AMPICILLIN SODIUM AND SULBACTAM SODIUM 3000 MG: 2; 1 INJECTION, POWDER, FOR SOLUTION INTRAMUSCULAR; INTRAVENOUS at 00:33

## 2023-01-01 RX ADMIN — AMIODARONE HYDROCHLORIDE 100 MG: 200 TABLET ORAL at 09:55

## 2023-01-01 RX ADMIN — MORPHINE SULFATE 2 MG: 2 INJECTION, SOLUTION INTRAMUSCULAR; INTRAVENOUS at 08:57

## 2023-01-01 RX ADMIN — CARVEDILOL 6.25 MG: 6.25 TABLET, FILM COATED ORAL at 08:09

## 2023-01-01 RX ADMIN — TAMSULOSIN HYDROCHLORIDE 0.4 MG: 0.4 CAPSULE ORAL at 10:10

## 2023-01-01 RX ADMIN — APIXABAN 2.5 MG: 2.5 TABLET, FILM COATED ORAL at 10:12

## 2023-01-01 RX ADMIN — APIXABAN 2.5 MG: 2.5 TABLET, FILM COATED ORAL at 21:09

## 2023-01-01 RX ADMIN — SODIUM CHLORIDE, PRESERVATIVE FREE 10 ML: 5 INJECTION INTRAVENOUS at 13:25

## 2023-01-01 RX ADMIN — SODIUM CHLORIDE, PRESERVATIVE FREE 10 ML: 5 INJECTION INTRAVENOUS at 10:20

## 2023-01-01 RX ADMIN — AMIODARONE HYDROCHLORIDE 100 MG: 200 TABLET ORAL at 10:10

## 2023-01-01 RX ADMIN — MORPHINE SULFATE 2 MG: 2 INJECTION, SOLUTION INTRAMUSCULAR; INTRAVENOUS at 21:40

## 2023-01-01 RX ADMIN — APIXABAN 2.5 MG: 2.5 TABLET, FILM COATED ORAL at 08:54

## 2023-01-01 RX ADMIN — METHYLPREDNISOLONE SODIUM SUCCINATE 40 MG: 40 INJECTION, POWDER, FOR SOLUTION INTRAMUSCULAR; INTRAVENOUS at 18:35

## 2023-01-01 RX ADMIN — ATORVASTATIN CALCIUM 40 MG: 40 TABLET, FILM COATED ORAL at 09:45

## 2023-01-01 RX ADMIN — POTASSIUM CHLORIDE 40 MEQ: 20 TABLET, EXTENDED RELEASE ORAL at 13:07

## 2023-01-01 RX ADMIN — SODIUM CHLORIDE, PRESERVATIVE FREE 10 ML: 5 INJECTION INTRAVENOUS at 08:58

## 2023-01-01 RX ADMIN — CARVEDILOL 12.5 MG: 12.5 TABLET, FILM COATED ORAL at 08:23

## 2023-01-01 RX ADMIN — AMIODARONE HYDROCHLORIDE 100 MG: 200 TABLET ORAL at 09:26

## 2023-01-01 RX ADMIN — POTASSIUM CHLORIDE 40 MEQ: 1500 TABLET, EXTENDED RELEASE ORAL at 09:11

## 2023-01-01 RX ADMIN — ATORVASTATIN CALCIUM 40 MG: 40 TABLET, FILM COATED ORAL at 09:00

## 2023-01-01 RX ADMIN — MORPHINE SULFATE 2 MG: 2 INJECTION, SOLUTION INTRAMUSCULAR; INTRAVENOUS at 19:52

## 2023-01-01 RX ADMIN — SODIUM CHLORIDE, PRESERVATIVE FREE 10 ML: 5 INJECTION INTRAVENOUS at 21:28

## 2023-01-01 RX ADMIN — LIDOCAINE HYDROCHLORIDE: 20 JELLY TOPICAL at 09:20

## 2023-01-01 RX ADMIN — POTASSIUM CHLORIDE 60 MEQ: 1500 TABLET, EXTENDED RELEASE ORAL at 09:26

## 2023-01-01 RX ADMIN — TAMSULOSIN HYDROCHLORIDE 0.4 MG: 0.4 CAPSULE ORAL at 09:17

## 2023-01-01 RX ADMIN — CARVEDILOL 12.5 MG: 12.5 TABLET, FILM COATED ORAL at 17:52

## 2023-01-01 RX ADMIN — ACETAMINOPHEN 650 MG: 325 TABLET ORAL at 17:04

## 2023-01-01 RX ADMIN — SODIUM CHLORIDE, PRESERVATIVE FREE 10 ML: 5 INJECTION INTRAVENOUS at 20:30

## 2023-01-01 RX ADMIN — FUROSEMIDE 40 MG: 40 TABLET ORAL at 08:09

## 2023-01-01 RX ADMIN — FUROSEMIDE 40 MG: 40 TABLET ORAL at 17:41

## 2023-01-01 RX ADMIN — ACETAMINOPHEN 650 MG: 325 TABLET ORAL at 05:28

## 2023-01-01 RX ADMIN — SODIUM CHLORIDE, PRESERVATIVE FREE 10 ML: 5 INJECTION INTRAVENOUS at 09:11

## 2023-01-01 RX ADMIN — SODIUM CHLORIDE, PRESERVATIVE FREE 10 ML: 5 INJECTION INTRAVENOUS at 10:04

## 2023-01-01 RX ADMIN — POLYETHYLENE GLYCOL 3350 17 G: 17 POWDER, FOR SOLUTION ORAL at 08:09

## 2023-01-01 RX ADMIN — TAMSULOSIN HYDROCHLORIDE 0.4 MG: 0.4 CAPSULE ORAL at 09:23

## 2023-01-01 RX ADMIN — ATORVASTATIN CALCIUM 40 MG: 40 TABLET, FILM COATED ORAL at 10:12

## 2023-01-01 RX ADMIN — SODIUM CHLORIDE, PRESERVATIVE FREE 10 ML: 5 INJECTION INTRAVENOUS at 21:09

## 2023-01-01 RX ADMIN — TAMSULOSIN HYDROCHLORIDE 0.4 MG: 0.4 CAPSULE ORAL at 09:25

## 2023-01-01 RX ADMIN — INSULIN GLARGINE 3 UNITS: 100 INJECTION, SOLUTION SUBCUTANEOUS at 21:03

## 2023-01-01 RX ADMIN — ATORVASTATIN CALCIUM 40 MG: 40 TABLET, FILM COATED ORAL at 08:09

## 2023-01-01 RX ADMIN — APIXABAN 2.5 MG: 2.5 TABLET, FILM COATED ORAL at 01:21

## 2023-01-01 RX ADMIN — LORAZEPAM 1 MG: 2 INJECTION INTRAMUSCULAR; INTRAVENOUS at 02:16

## 2023-01-01 RX ADMIN — POLYETHYLENE GLYCOL 3350 17 G: 17 POWDER, FOR SOLUTION ORAL at 09:46

## 2023-01-01 RX ADMIN — POTASSIUM CHLORIDE 40 MEQ: 1500 TABLET, EXTENDED RELEASE ORAL at 10:13

## 2023-01-01 RX ADMIN — APIXABAN 2.5 MG: 2.5 TABLET, FILM COATED ORAL at 08:09

## 2023-01-01 RX ADMIN — INSULIN GLARGINE 3 UNITS: 100 INJECTION, SOLUTION SUBCUTANEOUS at 20:57

## 2023-01-01 RX ADMIN — LORAZEPAM 1 MG: 2 INJECTION INTRAMUSCULAR; INTRAVENOUS at 22:52

## 2023-01-01 RX ADMIN — ATORVASTATIN CALCIUM 40 MG: 40 TABLET, FILM COATED ORAL at 09:17

## 2023-01-01 RX ADMIN — TAMSULOSIN HYDROCHLORIDE 0.4 MG: 0.4 CAPSULE ORAL at 09:45

## 2023-01-01 RX ADMIN — METHYLPREDNISOLONE SODIUM SUCCINATE 40 MG: 40 INJECTION, POWDER, FOR SOLUTION INTRAMUSCULAR; INTRAVENOUS at 02:41

## 2023-01-01 RX ADMIN — FUROSEMIDE 40 MG: 40 TABLET ORAL at 09:56

## 2023-01-01 RX ADMIN — FUROSEMIDE 40 MG: 40 TABLET ORAL at 08:54

## 2023-01-01 RX ADMIN — ATORVASTATIN CALCIUM 40 MG: 40 TABLET, FILM COATED ORAL at 10:17

## 2023-01-01 RX ADMIN — AMIODARONE HYDROCHLORIDE 100 MG: 200 TABLET ORAL at 09:11

## 2023-01-01 RX ADMIN — FUROSEMIDE 80 MG: 40 TABLET ORAL at 18:08

## 2023-01-01 RX ADMIN — CARVEDILOL 12.5 MG: 12.5 TABLET, FILM COATED ORAL at 10:11

## 2023-01-01 RX ADMIN — CARVEDILOL 6.25 MG: 6.25 TABLET, FILM COATED ORAL at 17:41

## 2023-01-01 RX ADMIN — LANSOPRAZOLE 30 MG: 30 TABLET, ORALLY DISINTEGRATING, DELAYED RELEASE ORAL at 06:25

## 2023-01-01 RX ADMIN — SODIUM CHLORIDE, PRESERVATIVE FREE 10 ML: 5 INJECTION INTRAVENOUS at 20:32

## 2023-01-01 RX ADMIN — LORAZEPAM 1 MG: 2 INJECTION INTRAMUSCULAR; INTRAVENOUS at 09:11

## 2023-01-01 RX ADMIN — AMPICILLIN SODIUM AND SULBACTAM SODIUM 3000 MG: 2; 1 INJECTION, POWDER, FOR SOLUTION INTRAMUSCULAR; INTRAVENOUS at 01:05

## 2023-01-01 RX ADMIN — METHYLPREDNISOLONE SODIUM SUCCINATE 40 MG: 40 INJECTION, POWDER, FOR SOLUTION INTRAMUSCULAR; INTRAVENOUS at 09:24

## 2023-01-01 RX ADMIN — MAGNESIUM SULFATE HEPTAHYDRATE 2000 MG: 40 INJECTION, SOLUTION INTRAVENOUS at 09:21

## 2023-01-01 RX ADMIN — CARVEDILOL 6.25 MG: 6.25 TABLET, FILM COATED ORAL at 08:54

## 2023-01-01 RX ADMIN — POTASSIUM BICARBONATE 40 MEQ: 782 TABLET, EFFERVESCENT ORAL at 17:57

## 2023-01-01 RX ADMIN — LANSOPRAZOLE 30 MG: 30 TABLET, ORALLY DISINTEGRATING, DELAYED RELEASE ORAL at 06:04

## 2023-01-01 RX ADMIN — CARVEDILOL 12.5 MG: 12.5 TABLET, FILM COATED ORAL at 16:51

## 2023-01-01 RX ADMIN — SODIUM CHLORIDE, PRESERVATIVE FREE 10 ML: 5 INJECTION INTRAVENOUS at 21:43

## 2023-01-01 RX ADMIN — APIXABAN 2.5 MG: 2.5 TABLET, FILM COATED ORAL at 09:55

## 2023-01-01 RX ADMIN — CARVEDILOL 6.25 MG: 6.25 TABLET, FILM COATED ORAL at 09:22

## 2023-01-01 RX ADMIN — AMPICILLIN SODIUM AND SULBACTAM SODIUM 3000 MG: 2; 1 INJECTION, POWDER, FOR SOLUTION INTRAMUSCULAR; INTRAVENOUS at 12:17

## 2023-01-01 RX ADMIN — POTASSIUM CHLORIDE 40 MEQ: 1500 TABLET, EXTENDED RELEASE ORAL at 09:23

## 2023-01-01 RX ADMIN — POTASSIUM CHLORIDE 10 MEQ: 7.46 INJECTION, SOLUTION INTRAVENOUS at 16:33

## 2023-01-01 RX ADMIN — AMIODARONE HYDROCHLORIDE 100 MG: 200 TABLET ORAL at 08:23

## 2023-01-01 RX ADMIN — CARVEDILOL 12.5 MG: 12.5 TABLET, FILM COATED ORAL at 17:04

## 2023-01-01 RX ADMIN — POLYETHYLENE GLYCOL 3350 17 G: 17 POWDER, FOR SOLUTION ORAL at 09:24

## 2023-01-01 RX ADMIN — TAMSULOSIN HYDROCHLORIDE 0.4 MG: 0.4 CAPSULE ORAL at 08:53

## 2023-01-01 RX ADMIN — CARVEDILOL 6.25 MG: 6.25 TABLET, FILM COATED ORAL at 18:23

## 2023-01-01 RX ADMIN — APIXABAN 2.5 MG: 2.5 TABLET, FILM COATED ORAL at 21:23

## 2023-01-01 RX ADMIN — NITROGLYCERIN 1 INCH: 20 OINTMENT TOPICAL at 21:29

## 2023-01-01 RX ADMIN — TAMSULOSIN HYDROCHLORIDE 0.4 MG: 0.4 CAPSULE ORAL at 09:55

## 2023-01-01 RX ADMIN — TAMSULOSIN HYDROCHLORIDE 0.4 MG: 0.4 CAPSULE ORAL at 10:16

## 2023-01-01 RX ADMIN — TAMSULOSIN HYDROCHLORIDE 0.4 MG: 0.4 CAPSULE ORAL at 10:12

## 2023-01-01 RX ADMIN — APIXABAN 2.5 MG: 2.5 TABLET, FILM COATED ORAL at 09:11

## 2023-01-01 RX ADMIN — FUROSEMIDE 40 MG: 10 INJECTION, SOLUTION INTRAMUSCULAR; INTRAVENOUS at 15:54

## 2023-01-01 RX ADMIN — LANSOPRAZOLE 30 MG: 30 TABLET, ORALLY DISINTEGRATING, DELAYED RELEASE ORAL at 06:35

## 2023-01-01 RX ADMIN — FUROSEMIDE 40 MG: 10 INJECTION, SOLUTION INTRAMUSCULAR; INTRAVENOUS at 17:01

## 2023-01-01 RX ADMIN — LANSOPRAZOLE 30 MG: 30 TABLET, ORALLY DISINTEGRATING, DELAYED RELEASE ORAL at 06:34

## 2023-01-01 RX ADMIN — CARVEDILOL 12.5 MG: 12.5 TABLET, FILM COATED ORAL at 17:57

## 2023-01-01 RX ADMIN — CARVEDILOL 6.25 MG: 6.25 TABLET, FILM COATED ORAL at 09:00

## 2023-01-01 RX ADMIN — AMIODARONE HYDROCHLORIDE 100 MG: 200 TABLET ORAL at 09:46

## 2023-01-01 RX ADMIN — CARVEDILOL 12.5 MG: 12.5 TABLET, FILM COATED ORAL at 18:08

## 2023-01-01 RX ADMIN — APIXABAN 2.5 MG: 2.5 TABLET, FILM COATED ORAL at 09:45

## 2023-01-01 RX ADMIN — LANSOPRAZOLE 30 MG: 30 TABLET, ORALLY DISINTEGRATING, DELAYED RELEASE ORAL at 05:38

## 2023-01-01 RX ADMIN — AMIODARONE HYDROCHLORIDE 100 MG: 200 TABLET ORAL at 09:00

## 2023-01-01 RX ADMIN — APIXABAN 2.5 MG: 2.5 TABLET, FILM COATED ORAL at 20:37

## 2023-01-01 RX ADMIN — CARVEDILOL 12.5 MG: 12.5 TABLET, FILM COATED ORAL at 09:17

## 2023-01-01 RX ADMIN — FUROSEMIDE 40 MG: 10 INJECTION, SOLUTION INTRAMUSCULAR; INTRAVENOUS at 10:00

## 2023-01-01 RX ADMIN — TAMSULOSIN HYDROCHLORIDE 0.4 MG: 0.4 CAPSULE ORAL at 09:11

## 2023-01-01 RX ADMIN — SODIUM CHLORIDE, PRESERVATIVE FREE 10 ML: 5 INJECTION INTRAVENOUS at 08:25

## 2023-01-01 RX ADMIN — POLYETHYLENE GLYCOL 3350 17 G: 17 POWDER, FOR SOLUTION ORAL at 09:11

## 2023-01-01 RX ADMIN — SODIUM CHLORIDE, PRESERVATIVE FREE 10 ML: 5 INJECTION INTRAVENOUS at 08:13

## 2023-01-01 RX ADMIN — AMPICILLIN SODIUM AND SULBACTAM SODIUM 3000 MG: 2; 1 INJECTION, POWDER, FOR SOLUTION INTRAMUSCULAR; INTRAVENOUS at 12:41

## 2023-01-01 RX ADMIN — FUROSEMIDE 40 MG: 40 TABLET ORAL at 18:41

## 2023-01-01 RX ADMIN — SODIUM CHLORIDE, PRESERVATIVE FREE 10 ML: 5 INJECTION INTRAVENOUS at 21:02

## 2023-01-01 RX ADMIN — AMIODARONE HYDROCHLORIDE 100 MG: 200 TABLET ORAL at 10:12

## 2023-01-01 RX ADMIN — ATORVASTATIN CALCIUM 40 MG: 40 TABLET, FILM COATED ORAL at 08:23

## 2023-01-01 RX ADMIN — APIXABAN 2.5 MG: 2.5 TABLET, FILM COATED ORAL at 09:00

## 2023-01-01 RX ADMIN — AMIODARONE HYDROCHLORIDE 100 MG: 200 TABLET ORAL at 08:09

## 2023-01-01 RX ADMIN — POLYETHYLENE GLYCOL 3350 17 G: 17 POWDER, FOR SOLUTION ORAL at 10:10

## 2023-01-01 RX ADMIN — SODIUM CHLORIDE, PRESERVATIVE FREE 10 ML: 5 INJECTION INTRAVENOUS at 10:12

## 2023-01-01 RX ADMIN — SODIUM CHLORIDE, PRESERVATIVE FREE 10 ML: 5 INJECTION INTRAVENOUS at 20:38

## 2023-01-01 RX ADMIN — APIXABAN 2.5 MG: 2.5 TABLET, FILM COATED ORAL at 20:30

## 2023-01-01 RX ADMIN — AMPICILLIN SODIUM AND SULBACTAM SODIUM 3000 MG: 2; 1 INJECTION, POWDER, FOR SOLUTION INTRAMUSCULAR; INTRAVENOUS at 00:40

## 2023-01-01 RX ADMIN — FUROSEMIDE 40 MG: 10 INJECTION, SOLUTION INTRAMUSCULAR; INTRAVENOUS at 13:14

## 2023-01-01 RX ADMIN — AMIODARONE HYDROCHLORIDE 100 MG: 200 TABLET ORAL at 08:54

## 2023-01-01 RX ADMIN — METHYLPREDNISOLONE SODIUM SUCCINATE 40 MG: 125 INJECTION, POWDER, FOR SOLUTION INTRAMUSCULAR; INTRAVENOUS at 05:38

## 2023-01-01 RX ADMIN — INSULIN GLARGINE 3 UNITS: 100 INJECTION, SOLUTION SUBCUTANEOUS at 20:39

## 2023-01-01 RX ADMIN — MORPHINE SULFATE 2 MG: 2 INJECTION, SOLUTION INTRAMUSCULAR; INTRAVENOUS at 14:01

## 2023-01-01 RX ADMIN — FUROSEMIDE 40 MG: 10 INJECTION, SOLUTION INTRAMUSCULAR; INTRAVENOUS at 08:57

## 2023-01-01 RX ADMIN — AMIODARONE HYDROCHLORIDE 100 MG: 200 TABLET ORAL at 09:23

## 2023-01-01 RX ADMIN — CARVEDILOL 12.5 MG: 12.5 TABLET, FILM COATED ORAL at 10:16

## 2023-01-01 RX ADMIN — CARVEDILOL 12.5 MG: 12.5 TABLET, FILM COATED ORAL at 09:40

## 2023-01-01 RX ADMIN — CARVEDILOL 12.5 MG: 12.5 TABLET, FILM COATED ORAL at 18:35

## 2023-01-01 RX ADMIN — APIXABAN 2.5 MG: 2.5 TABLET, FILM COATED ORAL at 10:10

## 2023-01-01 RX ADMIN — TAMSULOSIN HYDROCHLORIDE 0.4 MG: 0.4 CAPSULE ORAL at 08:09

## 2023-01-01 RX ADMIN — SODIUM CHLORIDE, PRESERVATIVE FREE 10 ML: 5 INJECTION INTRAVENOUS at 21:05

## 2023-01-01 RX ADMIN — FUROSEMIDE 40 MG: 40 TABLET ORAL at 16:50

## 2023-01-01 RX ADMIN — FUROSEMIDE 40 MG: 10 INJECTION, SOLUTION INTRAMUSCULAR; INTRAVENOUS at 21:15

## 2023-01-01 RX ADMIN — SODIUM CHLORIDE, PRESERVATIVE FREE 10 ML: 5 INJECTION INTRAVENOUS at 22:11

## 2023-01-01 RX ADMIN — APIXABAN 2.5 MG: 2.5 TABLET, FILM COATED ORAL at 21:28

## 2023-01-01 RX ADMIN — MORPHINE SULFATE 2 MG: 2 INJECTION, SOLUTION INTRAMUSCULAR; INTRAVENOUS at 04:07

## 2023-01-01 RX ADMIN — AMPICILLIN SODIUM AND SULBACTAM SODIUM 3000 MG: 2; 1 INJECTION, POWDER, FOR SOLUTION INTRAMUSCULAR; INTRAVENOUS at 14:14

## 2023-01-01 RX ADMIN — ATORVASTATIN CALCIUM 40 MG: 40 TABLET, FILM COATED ORAL at 09:25

## 2023-01-01 RX ADMIN — SODIUM CHLORIDE, PRESERVATIVE FREE 10 ML: 5 INJECTION INTRAVENOUS at 20:15

## 2023-01-01 RX ADMIN — MORPHINE SULFATE 2 MG: 2 INJECTION, SOLUTION INTRAMUSCULAR; INTRAVENOUS at 09:08

## 2023-01-01 RX ADMIN — POLYETHYLENE GLYCOL 3350 17 G: 17 POWDER, FOR SOLUTION ORAL at 08:23

## 2023-01-01 RX ADMIN — AMPICILLIN SODIUM AND SULBACTAM SODIUM 3000 MG: 2; 1 INJECTION, POWDER, FOR SOLUTION INTRAMUSCULAR; INTRAVENOUS at 01:56

## 2023-01-01 RX ADMIN — APIXABAN 2.5 MG: 2.5 TABLET, FILM COATED ORAL at 20:48

## 2023-01-01 RX ADMIN — SODIUM CHLORIDE, PRESERVATIVE FREE 10 ML: 5 INJECTION INTRAVENOUS at 08:55

## 2023-01-01 RX ADMIN — POTASSIUM CHLORIDE 10 MEQ: 7.46 INJECTION, SOLUTION INTRAVENOUS at 17:38

## 2023-01-01 RX ADMIN — INSULIN GLARGINE 3 UNITS: 100 INJECTION, SOLUTION SUBCUTANEOUS at 20:31

## 2023-01-01 RX ADMIN — PANTOPRAZOLE SODIUM 40 MG: 40 TABLET, DELAYED RELEASE ORAL at 09:33

## 2023-01-01 RX ADMIN — SODIUM CHLORIDE, PRESERVATIVE FREE 10 ML: 5 INJECTION INTRAVENOUS at 20:59

## 2023-01-01 RX ADMIN — SODIUM CHLORIDE, PRESERVATIVE FREE 10 ML: 5 INJECTION INTRAVENOUS at 20:58

## 2023-01-01 RX ADMIN — ATORVASTATIN CALCIUM 40 MG: 40 TABLET, FILM COATED ORAL at 09:24

## 2023-01-01 RX ADMIN — CARVEDILOL 12.5 MG: 12.5 TABLET, FILM COATED ORAL at 09:25

## 2023-01-01 RX ADMIN — POLYETHYLENE GLYCOL 3350 17 G: 17 POWDER, FOR SOLUTION ORAL at 08:54

## 2023-01-01 RX ADMIN — INSULIN LISPRO 1 UNITS: 100 INJECTION, SOLUTION INTRAVENOUS; SUBCUTANEOUS at 12:48

## 2023-01-01 RX ADMIN — TAMSULOSIN HYDROCHLORIDE 0.4 MG: 0.4 CAPSULE ORAL at 09:24

## 2023-01-01 RX ADMIN — METHYLPREDNISOLONE SODIUM SUCCINATE 40 MG: 125 INJECTION, POWDER, FOR SOLUTION INTRAMUSCULAR; INTRAVENOUS at 20:32

## 2023-01-01 RX ADMIN — SODIUM CHLORIDE, PRESERVATIVE FREE 10 ML: 5 INJECTION INTRAVENOUS at 09:26

## 2023-01-01 RX ADMIN — MORPHINE SULFATE 2 MG: 2 INJECTION, SOLUTION INTRAMUSCULAR; INTRAVENOUS at 09:39

## 2023-01-01 RX ADMIN — AMPICILLIN SODIUM AND SULBACTAM SODIUM 3000 MG: 2; 1 INJECTION, POWDER, FOR SOLUTION INTRAMUSCULAR; INTRAVENOUS at 12:25

## 2023-01-01 RX ADMIN — FUROSEMIDE 40 MG: 10 INJECTION, SOLUTION INTRAMUSCULAR; INTRAVENOUS at 15:02

## 2023-01-01 RX ADMIN — CARVEDILOL 12.5 MG: 12.5 TABLET, FILM COATED ORAL at 09:55

## 2023-01-01 RX ADMIN — ACETAZOLAMIDE 125 MG: 250 TABLET ORAL at 09:38

## 2023-01-01 RX ADMIN — POLYETHYLENE GLYCOL 3350 17 G: 17 POWDER, FOR SOLUTION ORAL at 10:15

## 2023-01-01 RX ADMIN — METHYLPREDNISOLONE SODIUM SUCCINATE 40 MG: 125 INJECTION, POWDER, FOR SOLUTION INTRAMUSCULAR; INTRAVENOUS at 13:22

## 2023-01-01 RX ADMIN — APIXABAN 2.5 MG: 2.5 TABLET, FILM COATED ORAL at 09:26

## 2023-01-01 RX ADMIN — FUROSEMIDE 40 MG: 40 TABLET ORAL at 09:22

## 2023-01-01 RX ADMIN — AMPICILLIN SODIUM AND SULBACTAM SODIUM 3000 MG: 2; 1 INJECTION, POWDER, FOR SOLUTION INTRAMUSCULAR; INTRAVENOUS at 13:42

## 2023-01-01 RX ADMIN — AMIODARONE HYDROCHLORIDE 100 MG: 200 TABLET ORAL at 09:24

## 2023-01-01 RX ADMIN — FUROSEMIDE 40 MG: 40 TABLET ORAL at 12:48

## 2023-01-01 RX ADMIN — INSULIN LISPRO 1 UNITS: 100 INJECTION, SOLUTION INTRAVENOUS; SUBCUTANEOUS at 12:17

## 2023-01-01 RX ADMIN — APIXABAN 2.5 MG: 2.5 TABLET, FILM COATED ORAL at 22:11

## 2023-01-01 RX ADMIN — CARVEDILOL 12.5 MG: 12.5 TABLET, FILM COATED ORAL at 18:02

## 2023-01-01 RX ADMIN — FUROSEMIDE 40 MG: 40 TABLET ORAL at 18:35

## 2023-01-01 RX ADMIN — INSULIN LISPRO 1 UNITS: 100 INJECTION, SOLUTION INTRAVENOUS; SUBCUTANEOUS at 17:14

## 2023-01-01 RX ADMIN — INSULIN LISPRO 1 UNITS: 100 INJECTION, SOLUTION INTRAVENOUS; SUBCUTANEOUS at 09:26

## 2023-01-01 RX ADMIN — CARVEDILOL 12.5 MG: 12.5 TABLET, FILM COATED ORAL at 18:13

## 2023-01-01 RX ADMIN — ATORVASTATIN CALCIUM 40 MG: 40 TABLET, FILM COATED ORAL at 10:11

## 2023-01-01 RX ADMIN — APIXABAN 2.5 MG: 2.5 TABLET, FILM COATED ORAL at 09:17

## 2023-01-01 RX ADMIN — AMPICILLIN SODIUM AND SULBACTAM SODIUM 3000 MG: 2; 1 INJECTION, POWDER, FOR SOLUTION INTRAMUSCULAR; INTRAVENOUS at 01:21

## 2023-01-01 RX ADMIN — MORPHINE SULFATE 2 MG: 2 INJECTION, SOLUTION INTRAMUSCULAR; INTRAVENOUS at 00:14

## 2023-01-01 RX ADMIN — FUROSEMIDE 40 MG: 40 TABLET ORAL at 17:55

## 2023-01-01 RX ADMIN — MORPHINE SULFATE 2 MG: 2 INJECTION, SOLUTION INTRAMUSCULAR; INTRAVENOUS at 14:50

## 2023-01-01 RX ADMIN — POTASSIUM CHLORIDE 10 MEQ: 7.46 INJECTION, SOLUTION INTRAVENOUS at 15:26

## 2023-01-01 RX ADMIN — APIXABAN 2.5 MG: 2.5 TABLET, FILM COATED ORAL at 10:16

## 2023-01-01 RX ADMIN — POTASSIUM CHLORIDE 10 MEQ: 7.46 INJECTION, SOLUTION INTRAVENOUS at 18:40

## 2023-01-01 RX ADMIN — MORPHINE SULFATE 2 MG: 2 INJECTION, SOLUTION INTRAMUSCULAR; INTRAVENOUS at 13:11

## 2023-01-01 RX ADMIN — CARVEDILOL 12.5 MG: 12.5 TABLET, FILM COATED ORAL at 09:46

## 2023-01-01 RX ADMIN — ATORVASTATIN CALCIUM 40 MG: 40 TABLET, FILM COATED ORAL at 09:11

## 2023-01-01 RX ADMIN — AMPICILLIN SODIUM AND SULBACTAM SODIUM 3000 MG: 2; 1 INJECTION, POWDER, FOR SOLUTION INTRAMUSCULAR; INTRAVENOUS at 12:59

## 2023-01-01 RX ADMIN — POLYETHYLENE GLYCOL 3350 17 G: 17 POWDER, FOR SOLUTION ORAL at 09:17

## 2023-01-01 ASSESSMENT — PAIN DESCRIPTION - LOCATION
LOCATION: BACK;BUTTOCKS
LOCATION: BACK
LOCATION: SCROTUM
LOCATION: BACK;BUTTOCKS
LOCATION: BACK
LOCATION: BACK;BUTTOCKS

## 2023-01-01 ASSESSMENT — PAIN DESCRIPTION - DESCRIPTORS
DESCRIPTORS: ACHING;DISCOMFORT
DESCRIPTORS: BURNING
DESCRIPTORS: ACHING;DISCOMFORT
DESCRIPTORS: ACHING;CRAMPING

## 2023-01-01 ASSESSMENT — PAIN SCALES - GENERAL
PAINLEVEL_OUTOF10: 0
PAINLEVEL_OUTOF10: 8
PAINLEVEL_OUTOF10: 8
PAINLEVEL_OUTOF10: 0
PAINLEVEL_OUTOF10: 8
PAINLEVEL_OUTOF10: 0
PAINLEVEL_OUTOF10: 0
PAINLEVEL_OUTOF10: 7
PAINLEVEL_OUTOF10: 8
PAINLEVEL_OUTOF10: 8
PAINLEVEL_OUTOF10: 0
PAINLEVEL_OUTOF10: 3
PAINLEVEL_OUTOF10: 0

## 2023-01-01 ASSESSMENT — PAIN - FUNCTIONAL ASSESSMENT
PAIN_FUNCTIONAL_ASSESSMENT: PREVENTS OR INTERFERES WITH ALL ACTIVE AND SOME PASSIVE ACTIVITIES
PAIN_FUNCTIONAL_ASSESSMENT: PREVENTS OR INTERFERES WITH MANY ACTIVE NOT PASSIVE ACTIVITIES
PAIN_FUNCTIONAL_ASSESSMENT: PREVENTS OR INTERFERES WITH ALL ACTIVE AND SOME PASSIVE ACTIVITIES
PAIN_FUNCTIONAL_ASSESSMENT: ACTIVITIES ARE NOT PREVENTED

## 2023-01-01 ASSESSMENT — PAIN DESCRIPTION - ONSET
ONSET: ON-GOING
ONSET: GRADUAL
ONSET: SUDDEN

## 2023-01-01 ASSESSMENT — PAIN DESCRIPTION - FREQUENCY
FREQUENCY: INTERMITTENT
FREQUENCY: INTERMITTENT
FREQUENCY: CONTINUOUS

## 2023-01-01 ASSESSMENT — PAIN DESCRIPTION - PAIN TYPE
TYPE: CHRONIC PAIN

## 2023-01-01 ASSESSMENT — PAIN DESCRIPTION - ORIENTATION
ORIENTATION: LOWER
ORIENTATION: RIGHT

## 2023-01-01 ASSESSMENT — PAIN SCALES - WONG BAKER: WONGBAKER_NUMERICALRESPONSE: 0

## 2023-05-08 PROBLEM — I50.9 ACUTE DECOMPENSATED HEART FAILURE (HCC): Status: ACTIVE | Noted: 2023-01-01

## 2023-05-09 PROBLEM — I71.40 ABDOMINAL AORTIC ANEURYSM (AAA) WITHOUT RUPTURE (HCC): Status: ACTIVE | Noted: 2023-01-01

## 2023-05-09 PROBLEM — I25.5 ISCHEMIC CARDIOMYOPATHY: Status: ACTIVE | Noted: 2023-01-01

## 2023-05-09 PROBLEM — I48.0 PAF (PAROXYSMAL ATRIAL FIBRILLATION) (HCC): Status: ACTIVE | Noted: 2019-03-24

## 2023-05-09 PROBLEM — I50.23 ACUTE ON CHRONIC SYSTOLIC HEART FAILURE (HCC): Status: ACTIVE | Noted: 2023-01-01

## 2023-05-09 PROBLEM — N17.9 AKI (ACUTE KIDNEY INJURY) (HCC): Status: ACTIVE | Noted: 2023-01-01

## 2023-05-10 PROBLEM — J96.01 ACUTE RESPIRATORY FAILURE WITH HYPOXIA (HCC): Status: ACTIVE | Noted: 2023-01-01

## 2023-05-10 PROBLEM — I25.10 CORONARY ARTERY DISEASE INVOLVING NATIVE CORONARY ARTERY OF NATIVE HEART WITHOUT ANGINA PECTORIS: Status: ACTIVE | Noted: 2023-01-01

## 2023-05-22 NOTE — PLAN OF CARE
Problem: Discharge Planning  Goal: Discharge to home or other facility with appropriate resources  Outcome: Progressing  Care coordination involved to assess needs and help determine and explore appropriate resources. Problem: Safety - Adult  Goal: Free from fall injury  Outcome: Progressing  Fall risk assessment completed every shift. All precautions in place. Patient has call light with in reach at all times. Room free from clutter. Patient aware to call for assistance when getting up. Problem: Skin/Tissue Integrity  Goal: Absence of new skin breakdown  Description: 1. Monitor for areas of redness and/or skin breakdown  2. Assess vascular access sites hourly  3. Every 4-6 hours minimum:  Change oxygen saturation probe site  4. Every 4-6 hours:  If on nasal continuous positive airway pressure, respiratory therapy assess nares and determine need for appliance change or resting period. Outcome: Progressing  Skin assessment completed every shift. Patient assessed for incontinence, appropriate barrier cream applied prn. Patient encouraged to turn/rotate every 2 hours. Assistance provided if patient unable to do so themselves. Problem: ABCDS Injury Assessment  Goal: Absence of physical injury  Outcome: Progressing  Patient free of physical injury     Problem: Cardiovascular - Adult  Goal: Maintains optimal cardiac output and hemodynamic stability  Outcome: Progressing  Patient's heart rate and rhythm, vital signs, and labs are being monitored for changes. Problem: Pain  Goal: Verbalizes/displays adequate comfort level or baseline comfort level  Outcome: Progressing  Pain being managed with PRN analgesics per MD orders. Patient able to express presence and absence of pain and rate pain appropriately using numerical scale.

## 2023-05-22 NOTE — PLAN OF CARE
Problem: Discharge Planning  Goal: Discharge to home or other facility with appropriate resources  5/22/2023 1442 by Betzaida Leggett RN  Outcome: Progressing     Problem: Safety - Adult  Goal: Free from fall injury  5/22/2023 1442 by Betzaida Leggett RN  Outcome: Progressing     Problem: Skin/Tissue Integrity  Goal: Absence of new skin breakdown  Description: 1. Monitor for areas of redness and/or skin breakdown  2. Assess vascular access sites hourly  3. Every 4-6 hours minimum:  Change oxygen saturation probe site  4. Every 4-6 hours:  If on nasal continuous positive airway pressure, respiratory therapy assess nares and determine need for appliance change or resting period.   5/22/2023 1442 by Betzaida Leggett RN  Outcome: Progressing     Problem: ABCDS Injury Assessment  Goal: Absence of physical injury  5/22/2023 1442 by Betzaida Leggett RN  Outcome: Progressing     Problem: Cardiovascular - Adult  Goal: Maintains optimal cardiac output and hemodynamic stability  5/22/2023 1442 by Betzaida Leggett RN  Outcome: Progressing     Problem: Cardiovascular - Adult  Goal: Absence of cardiac dysrhythmias or at baseline  Outcome: Progressing     Problem: Musculoskeletal - Adult  Goal: Return mobility to safest level of function  Outcome: Progressing     Problem: Infection - Adult  Goal: Absence of infection at discharge  Outcome: Progressing     Problem: Metabolic/Fluid and Electrolytes - Adult  Goal: Electrolytes maintained within normal limits  Outcome: Progressing  Goal: Hemodynamic stability and optimal renal function maintained  Outcome: Progressing  Goal: Glucose maintained within prescribed range  Outcome: Progressing     Problem: Hematologic - Adult  Goal: Maintains hematologic stability  Outcome: Progressing     Problem: Pain  Goal: Verbalizes/displays adequate comfort level or baseline comfort level  5/22/2023 1442 by April Hanna RN  Outcome: Progressing     Problem: Nutrition Deficit:  Goal: Optimize nutritional

## 2023-05-23 NOTE — PLAN OF CARE
Problem: Discharge Planning  Goal: Discharge to home or other facility with appropriate resources  Outcome: Progressing  Care coordination involved to assess needs and help determine and explore appropriate resources. Problem: Safety - Adult  Goal: Free from fall injury  Outcome: Progressing  Fall risk assessment completed every shift. All precautions in place. Patient has call light with in reach at all times. Room free from clutter. Patient aware to call for assistance when getting up. Problem: Skin/Tissue Integrity  Goal: Absence of new skin breakdown  Description: 1. Monitor for areas of redness and/or skin breakdown  2. Assess vascular access sites hourly  3. Every 4-6 hours minimum:  Change oxygen saturation probe site  4. Every 4-6 hours:  If on nasal continuous positive airway pressure, respiratory therapy assess nares and determine need for appliance change or resting period. Outcome: Progressing  Skin assessment completed every shift. Patient assessed for incontinence, appropriate barrier cream applied prn. Patient encouraged to turn/rotate every 2 hours. Assistance provided if patient unable to do so themselves. Problem: ABCDS Injury Assessment  Goal: Absence of physical injury  Outcome: Progressing  Patient remains free of physical injury     Problem: Pain  Goal: Verbalizes/displays adequate comfort level or baseline comfort level  Outcome: Progressing  Pain being managed with PRN analgesics per MD orders. Patient able to express presence and absence of pain and rate pain appropriately using numerical scale. Problem: Chronic Conditions and Co-morbidities  Goal: Patient's chronic conditions and co-morbidity symptoms are monitored and maintained or improved  Outcome: Progressing  Patient's heart rate and rhythm, vital signs, and labs are being monitored for changes.

## 2023-05-23 NOTE — CONSULTS
[facility]    RN spoke to [place] with Pc and BRADY Knapp. Liaison will follow up tomorrow to further family conversation about switching pt to comfort care and weaning down on O2 to withdrawal of care.      Thank you for this referral,   Please call [address] [place] [address] with questions/ concerns at [phone]  [address]   [address] [place] [address] Admissions Liaison

## 2023-05-23 NOTE — CARE COORDINATION
Discharge Planning:    Per chart review, patient's family has agreed to hospice care and turning off patient's defibrillator. Will continue to follow for updates/discharge planning needs.      Electronically signed by Myriam Childers RN on 5/23/2023 at 11:25 AM

## 2023-05-25 NOTE — DISCHARGE SUMMARY
INTERNAL MEDICINE DEPARTMENT - [facility]   DISCHARGE SUMMARY - DEATH NOTE   TIME OF DEATH 10:29, 5/25/2023    Patient ID: Wesley Fletcher                                             Patient's PCP: Anant Rosado                                          Admitting Physician: Leida Rosenberg MD  Admit Date: 5/8/2023   Discharge Physician: Krystyna Gregory MD MD  Discharge Date: 5/25/2023     PROBLEMS DURING HOSPITALIZATION:  Patient Active Problem List   Diagnosis    TIA (transient ischemic attack)    Hip fracture requiring operative repair, left, closed, initial encounter ([address]    Hip pain, acute, left    Essential hypertension    Hyperlipidemia    DMII (diabetes mellitus, type 2) (HCC)    PAF (paroxysmal atrial fibrillation) (HCC)    Gastritis without bleeding    Epigastric pain    Chest pain    Shortness of breath    Presence of cardiac defibrillator    AMS (altered mental status)    Acute cerebrovascular accident (CVA) ([address]    Acute decompensated heart failure ([address]    Acute on chronic systolic heart failure ([address]    Ischemic cardiomyopathy    Abdominal aortic aneurysm (AAA) without rupture ([address]    CHARLEY (acute kidney injury) ([address]    Acute respiratory failure with hypoxia ([address]    Coronary artery disease involving native coronary artery of native heart without angina pectoris       DISCHARGE DIAGNOSES:  1-acute respiratory failure with hypoxia  2-NSTEMI  3-Paroxysmal atrial fibrillation  4-hyponatremia  5-thrombocytopenia  6-CHARLEY  7-delirium    Hospital Course:  81 y.o. male with past medical history of heart failure with reduced ejection fraction, CAD s/p PCI, AAA, paroxysmal Atrial Fibrillation is admitted with worsening shortness of breath.      Acute Respiratory failure with hypoxia  Secondary to Acute on chronic heart failure with reduced ejection fraction  And Aspiration Pneumonia, concerned about chronic changes of aspiration  Most recent EF 30%, has AICD  Adequately diuresed now on PO Lasix  Completed IV

## 2023-05-25 NOTE — PROGRESS NOTES
[facility]  Palliative Care   Progress Note    NAME:  Dayron Cotton RECORD NUMBER:  [id number]  AGE: 81 y.o. GENDER: male  : 1935  TODAY'S DATE:  2023    Subjective: patient non responsive, resp shallow, just received ativan     Objective:    Vitals:    23 0906   BP: (!) 74/48   Pulse: 79   Resp: 29   Temp: (!) 100.9 °F (38.3 °C)   SpO2: 96%     Lab Results   Component Value Date    WBC 20.8 (H) 2023    HGB 9.4 (L) 2023    HCT 28.0 (L) 2023    MCV 88.6 2023    PLT 91 (L) 2023     Lab Results   Component Value Date    CREATININE 2.0 (H) 2023    BUN 49 (H) 2023     (L) 2023    K 3.8 2023    CL 97 (L) 2023    CO2 31 2023     Lab Results   Component Value Date    ALT 7 (L) 2023    AST 13 (L) 2023    ALKPHOS 61 2023    BILITOT 0.7 2023       Plan: son at bedside we discussed comfort care and transitioning to hospice care, he expressed understanding and agreed. Rest of family to be here shortly. Discussed with Anne Nguyen bedside nurse  Discussed with Dr Afshan Harding orders noted       Code Status: DNR-CC  Discharge Environment:  [x] Hospice Consult Agency: [address] following may change to partnered patient today,     Teaching Time:  0hours  30 min     I will continue to follow Mr. Carroll Spurling care as needed. Thank you for allowing me to participate in the care of Mr. Carlton Bailon .      Electronically signed by Patricia New, RN, BSN,CHPN on 2023 at 9:58 AM  Palliative Care Nurse [facility]  Office: [phone]
Comprehensive Nutrition Assessment    Type and Reason for Visit:  Reassess    Nutrition Recommendations/Plan:   Nutrition as tolerated/as appropriate  Continue Magic cup as desired  Monitor goals of care. Malnutrition Assessment:  Malnutrition Status: At risk for malnutrition (Comment) (05/17/23 1116)    Context:  Acute Illness       Nutrition Assessment:    Follow up:  Continues on pureed, low sodium diet with Magic cup bid. PO intake still poor. Palliative care working with family on goals of care, discussion of H[facility] at [facility]. On high levels of O2. Will continue to monitor. Nutrition Related Findings:    Na 134 mmol/L this am; BG within normal limits but has dropped below 80 this am Wound Type: Multiple, Stage II, Pressure Injury       Current Nutrition Intake & Therapies:    Average Meal Intake: 1-25%  Average Supplements Intake: 0%  ADULT DIET; Dysphagia - Pureed; Low Sodium (2 gm)  ADULT ORAL NUTRITION SUPPLEMENT; Lunch, Dinner; Frozen Oral Supplement    Anthropometric Measures:  Height: 5' 5\" (165.1 cm)  Ideal Body Weight (IBW): 136 lbs (62 kg)       Current Body Weight: 140 lb 6 oz (63.7 kg),   IBW.  Weight Source: Bed Scale  Current BMI (kg/m2): 23.4        Weight Adjustment For: No Adjustment                 BMI Categories: Normal Weight (BMI 22.0 to 24.9) age over 72    Estimated Daily Nutrient Needs:  Energy Requirements Based On: Kcal/kg  Weight Used for Energy Requirements: Current  Energy (kcal/day): 9506-4080 kcal (22-27 kcal/kg)  Weight Used for Protein Requirements: Current  Protein (g/day):  gm (1.3-1.6 gm/kg)  Method Used for Fluid Requirements: 1 ml/kcal  Fluid (ml/day): 0708-7811 mL    Nutrition Diagnosis:   Inadequate oral intake related to inadequate protein-energy intake as evidenced by intake 0-25%    Nutrition Interventions:   Food and/or Nutrient Delivery: Continue Current Diet, Continue Oral Nutrition Supplement  Nutrition Education/Counseling: Education not
[facility]  1052- Call to luz elena Crowder at phone number [phone] requesting a call back regarding decision about hospice enrollment. Had to leave voicemail. Will await his return call. During my meeting with family last week, it was noted they were unable to locate [address] [place] [address]. Per [facility] risk management, majority of living children simply need to agree on hospice in that scenario (3 out of the 4 need to be in agreement in order to enroll patient). It was noted by this RN that family stated last week patient has an active defibrillator. Upon discussing with them last week, they needed time to decide about the defibrillator being deactivated. Will await return call from luz elena Crowder for further direction. Last week, family informed me luz elena Crowder is main point of contact. At this current time, family has not indicated a decision about hospice or defibrillator. Will await return call from luz elena Crowder.      [address]  [phone] (work cell)  [phone] (main & referrals)
Dr. Arcadio Ramírez came up to room and spoke with family about pt's condition. Pt's wife, daughter and son are at bedside. Pt's defibrillator has been turned off.
Hypotension with sbp in the low 80s. DNRCC at this point. No pressor support per family at bedside.
[facility]  Palliative Care   Progress Note    NAME:  Dayron Cotton RECORD NUMBER:  [id number]  AGE: 81 y.o. GENDER: male  : 1935  TODAY'S DATE:  2023    Subjective: Patient resting, weak, desat with turning in bed, denies discomfort. Objective:    Vitals:    23 0848   BP:    Pulse: 60   Resp: 26   Temp:    SpO2: 98%     Lab Results   Component Value Date    WBC 20.8 (H) 2023    HGB 9.4 (L) 2023    HCT 28.0 (L) 2023    MCV 88.6 2023    PLT 91 (L) 2023     Lab Results   Component Value Date    CREATININE 2.0 (H) 2023    BUN 49 (H) 2023     (L) 2023    K 3.8 2023    CL 97 (L) 2023    CO2 31 2023     Lab Results   Component Value Date    ALT 7 (L) 2023    AST 13 (L) 2023    ALKPHOS 61 2023    BILITOT 0.7 2023       Plan: 0945 Family has been trying to make a decision about hospice at [facility] vs returning to [facility] with therapy. Today he is weak, tired on vapotherm at 30L 100%, he was not able to get up with therapy and when he repositioned in bed oxygen dropped to 85% took time to come up and only at 90% after 5 min. I called Anuradha his daughter to discuss care and what family has decided they want. We discussed his decline and inability to discharge him on this level of oxygen. She is going to call Summer Chan his son. She may bring his wife to see him. She plans on calling me back. 0 Son Summer Chan returned call we discussed current condition of his father. He agrees with turning off defibrillator, 3 out of the 4 siblings agree he is dying. Informed Dr Brooklynn Bond. 0 patient's wife, daughter Anastasia Ortiz and son in law at bedside. We have discussed with patient included there are no further interventions that will help. His oxygen requirement keeps increasing, he has asp pneumonia and CHF with EF of 30%. They have expressed understanding.  The patient and rest of family agree with turning
Met with patient's children Shona Melendez and other family members at bedside.  Answered questions regarding patient comfort
Met with patient's son Subhash Wei, daughters Nancy Merritt and Alex Golden as well as a few other family members at bedside. Answered questions regarding maintaining patient comfort during expected decline. Information provided regarding grief support services. Family aware that [address] will continue to follow, be available for support, and services if needed.       [address]   [phone]
[facility]  Sat down with patient's floor RN Faiza. Placed a joint call to daughter Li Lyle as we are not able to secure a call back from son Sidney Dempsey. Floor RN Faiza and myself informed Anuradha that patient's oxygen levels have been in the 70's today but oxygen has now been increased to 15 L HFNC. Also informed Anuradha the defibrillator is reportedly still on. Anuradha asked if have discussed with Sidney Dempsey, informed her have not received a call back. Anuradha states she will call Sidney Dempsey and request he call back the floor nurse Faiza at . Faiza floor RN at [facility] will await call from son Sidney Dempsey.      [address]   (work cell)  [phone] (main & referrals)
NP on call was notified of the patient's BP and current status. NP spoke with the patient's family, family agreed that they did not want pressors. Patient is currently on 30L of oxygen. Resting in bed with eyes closed, will open eyes to voice. Family at bedside.      Electronically signed by Lobo Lepe RN on 5/23/2023 at 9:01 PM
Narcotic Waste Documentation    Administered 1 mg of 2mg and wasted 1 mg per [facility].  Electronically signed by Amado Moser RN on 5/25/2023 at 9:15 AM
Occupational Therapy  OT referral received and chart reviewed. Will await decision on hospice. Will follow-up tomorrow as appropriate.      Nitish Hicks, OTR/L
[facility]  Hypoglycemia Event and Prevention Plan      NAME: Robyn Stevens  MEDICAL RECORD NUMBER:  [id number]  AGE: 81 y.o. GENDER: male  : 1935  EPISODE DATE:  2023     Data     Recent Labs     23  1933 05/22/23  0735 23  1102 23  1618 23  0715   POCGLU 175* 99 128* 115* 122* 74       Medications  Scheduled Medications:   lansoprazole  30 mg Oral QAM AC    insulin glargine  3 Units SubCUTAneous Nightly    [Held by provider] furosemide  40 mg Oral BID    carvedilol  12.5 mg Oral BID WC    amiodarone  100 mg Oral Daily    apixaban  2.5 mg Oral BID    tamsulosin  0.4 mg Oral Daily    sodium chloride flush  5-40 mL IntraVENous 2 times per day    polyethylene glycol  17 g Oral Daily    insulin lispro  0-4 Units SubCUTAneous TID WC    insulin lispro  0-4 Units SubCUTAneous Nightly    atorvastatin  40 mg Oral Daily       Diet  Current diet/supplement order: ADULT DIET; Dysphagia - Pureed;  Low Sodium (2 gm)  ADULT ORAL NUTRITION SUPPLEMENT; Lunch, Dinner; Frozen Oral Supplement     Recorded PO: PO Meals Eaten (%): 1 - 25% last meal in flowsheets PO Supplement (%): 0%    Action       Physician Notified of event: Yes   Serina Carpenter MD      Responce     Achieved POCT Blood Glucose greater than 70 mg/dl: Yes      Medication plan updated: Yes       Electronically signed by Renea Briceño RN on 2023 at 8:55 AM
Patient prepped for security to [place] [facility] [place]. Security arrived and retrieved the body and transferred to [address] [place] [address] at 1430. Sent with two blankets that family left.   Electronically signed by Nessa Ornelas RN on 5/25/2023 at 2:36 PM
Patient resting in bed, eyes have been closed majority of this shift. Patient is lethargic with periods of restlessness. Respirations and work of breathing increase intermittently. PRN morphine and ativan were given per order, provided some relief. Patient currently on 5L of oxygen. Started to titrate oxygen and family requested that it be left at 5 for now. Patient is resting in bed, eyes closed. Telemetry monitor still connected and monitoring.
Patient with spO2 down to 38%. O2 topped out at 15L HFNC. Applied Non Rebreather at 15L. Patient began to recover. Now at 94%. RT informed.
Physical Therapy  Facility/Department: [address] PROGRESSIVE CARE  Physical Therapy Initial Assessment  Discharge Summary    Name: Kiara Manuel  : 1935  MRN: [id number]  Date of Service: 2023    Per chart review, patients family has now agreed to hospice care and turning off defibrillator, DISCHARGE from PT at this time due to medical status. Discharge Recommendations:  24 hour supervision or assist, Long Term Care without PT, Long Term Care with PT (pending progression, patient unable to participate in skilled PT intervention at this time due to medical status)   PT Equipment Recommendations  Equipment Needed: No  Other: defer to next level of care    Kiara Manuel scored a 7/24 on the AM-PAC short mobility form. Current research shows that an AM-PAC score of 18 or greater is typically associated with a discharge to the patient's home setting. Based on the patient's AM-PAC score and their current functional mobility deficits, it is recommended that the patient have 2-3 sessions per week of Physical Therapy at d/c to increase the patient's independence. At this time, this patient demonstrates the endurance and safety to discharge home with LTC without or without PT due to medical status  (HEALTH CARE: LEVEL 4 SICK  -PT/OT/Speech evaluations in home within 24-48 hours of discharge; including DME and home safety; Palliative Care referral ) and a follow up treatment frequency of 2-3x/wk. Please see assessment section for further patient specific details. If patient discharges prior to next session this note will serve as a discharge summary. Please see below for the latest assessment towards goals. Patient Diagnosis(es): The primary encounter diagnosis was Acute respiratory failure with hypoxia ([address] A diagnosis of Acute on chronic congestive heart failure, unspecified heart failure type [facility]) was also pertinent to this visit.   Past Medical History:  has a past medical history of Atrial
Physical Therapy  PT referral received and chart reviewed. Will await decision re hospice. Will follow-up tomorrow as approp.   Nic Le, PT
Pt daughter Asad LACY at bedside, stating family does not wish to have Hospice involved at this time, stating her hope is that pt return to facility and see his wife and \"perk up\". RN provided reassurance to Asad LACY, and let her know to have Fawn Armstrong call us or hospice rep to notify of their wishes. Asad LACY stating she will talk to Fawn Armstrong.   Electronically signed by Surekha Ruano RN on 5/22/2023 at 6:34 PM
Pt has numerous family members at bedside. Pt is increasingly restless, wanting head up and down, up and down. \"Can't get comfortable\" pt given Morphine 2mg as ordered prn. This nurse did not give 5pm scheduled carvedilol concern for aspiration.
Pt is resting in bed usually has eyes closed but opens eyes to name. Respirations are labored and tachypneic, pt remains on vapotherm. Continuous pulse ox is in place. Pt denies pain. No outward signs of pain. Mouth care provided. Pt tolerated well. Pt was able to take his medications crushed in applesauce and was able to take small sips of water. Hellen Figueroa has been in to see pt this morning. Call light within reach.
Pt shouted from room, RN entered to assess, pt stating how lonely he is. RN called pt daughter Jeffery Sandifer and asked if she could come sit with him for comfort, she agreed, stating she and her  was on their way. Pt notified, he smiled and expressed verbal gratitude. Awaiting family arrival at this time.   Electronically signed by Gonsalo Tejeda RN on 5/22/2023 at 3:28 PM
Pt's family appear to be coping with the end of life of pt and supporting one another. Family said they have no other spiritual needs at this time.      05/24/23 1309   Encounter Summary   Encounter Overview/Reason  Spiritual/Emotional Needs;Grief, Loss, and Adjustments   Service Provided For: Patient and family together   Referral/Consult From: Nurse   Support System Children;Family members   Last Encounter  05/24/23  (support and prayer CL)   Complexity of Encounter High   Begin Time 1258   End Time  1308   Total Time Calculated 10 min   Encounter    Type Follow up   Spiritual/Emotional needs   Type Spiritual Support   Grief, Loss, and Adjustments   Type End of Life   Assessment/Intervention/Outcome   Assessment Calm;Coping;Sad;Tearful   Intervention Active listening;Discussed belief system/Mormon practices/sherine;End of Life Care;Prayer (assurance of)/[place]   Outcome Coping;Engaged in conversation;Expressed Gratitude
RN received >3 calls from [employer] this morning of pt desatting into high 70s on 10L. RN entered room each time to remind pt of therapeutic breathing exercises, seen with success most visits. At this time, pt unable to maintain sats >79 on 10L, RN titrated pt to 12L high flow NC and notified RT. Pt sitting at 83%-12L at this time. Appears comfortable, no distress noted. Pulse Ox assessed and is working appropriately.    Electronically signed by Anton Roldan RN on 5/22/2023 at 12:53 PM
[facility]  Palliative Care   Progress Note    NAME:  Dayron Cotton RECORD NUMBER:  [id number]  AGE: 81 y.o. GENDER: male  : 1935  TODAY'S DATE:  2023    Subjective: more lethargic today, hypotensive    Objective:    Vitals:    23   BP: (!) 84/40   Pulse:    Resp:    Temp:    SpO2:      Lab Results   Component Value Date    WBC 20.8 (H) 2023    HGB 9.4 (L) 2023    HCT 28.0 (L) 2023    MCV 88.6 2023    PLT 91 (L) 2023     Lab Results   Component Value Date    CREATININE 2.0 (H) 2023    BUN 49 (H) 2023     (L) 2023    K 3.8 2023    CL 97 (L) 2023    CO2 31 2023     Lab Results   Component Value Date    ALT 7 (L) 2023    AST 13 (L) 2023    ALKPHOS 61 2023    BILITOT 0.7 2023       Plan: Patient's son Mame Nix and daughter Jb Lagunas at bedside. We have discussed his current condition remains on vapotherm. They agree with plan of stopping vapotherm, starting oxygen at 5L, keeping him comfortable with meds. Dr Maher and Rita Cardona informed of above. Code Status: DNR-CC  Discharge Environment:  [] Hospice Consult Agency:  [] Inpatient Hospice    [] Home with  [facility]   [] ECF with Hospice  [] ECF skilled care with Hospice to follow   [] Other:    Teaching Time:  0hours  30 min     I will continue to follow Mr. Araceli Bassett care as needed. Thank you for allowing me to participate in the care of Mr. Lulu Salcedo .      Electronically signed by Mechele Riedel, RN, BSN,CHPN on 2023 at 10:18 AM  Palliative Care Nurse [facility]  Office: [phone]
[facility]  Receiced voicemail from son [place] stating he is \"ready to move forward with it. \" No specifics given. Called back, no answer, left voicemail letting him know need consent from 3 out of 4 children to move forward with hospice. Daughter Anuradha at bedside currently. Is currently speaking on the phone. Talked with son in law and asked him to have Anuradha step out of the room when she is prepared to speak with me. Talked with daughter Timothy Pichardo, offered to answer any questions. She reports that she does not have any questions. At this point, she is not ready to commit to turning off the defibrillator or enrolling in hospice. There are 4 living children. Anuradha reports daughter Cat Simons is not ready to commit to hospice or defibrillator being turned off. Therefore, I am not able to move forward with hospice at this time as family does not have a PO- document that they are able to locate, therefore [place] requires majority of living children to choose hospice. HOC will continue to follow up.      [address]   (work cell)  [phone] (main & referrals)
To room 5281 for rounding about 1025. Patients son and palliative care nurse at bedside. Patient's respirations slowly decreasing. Palpating for pulse and listening to apical heart rate and unable to hear or feel a pulse. Patient is a DNR-CC and Dr. Davide Velazquez notified by palliative care nurse who arrived at bedside. Other family members have arrived and Dr. Davide Velazquez notified the family.   Electronically signed by Georgia Knight RN on 5/25/2023 at 10:47 AM
Tried to remove the NRB from pt with the high flow cannula. Spo2 did decrease to 80%.  NRB placed back on pt
V2.0    [facility] [place]      Name:  Ava Mota /Age/Sex: 1935  (81 y.o. male)   MRN & CSN:  [id number] & [id number] Encounter Date/Time: 2023 9:02 PM EDT   Location:  [id number] PCP: Casandra Muñiz MD       Hospital Day: 16    Assessment and Recommendations   Ava Mota is a 81 y.o. male with past medical history of heart failure with reduced ejection fraction, CAD s/p PCI, AAA, paroxysmal Atrial Fibrillation is admitted with worsening shortness of breath. Assessment    Acute Respiratory failure with hypoxia  Secondary to Acute on chronic heart failure with reduced ejection fraction  And Aspiration Pneumonia, concerned about chronic changes of aspiration  Most recent EF 30%, has AICD  Adequately diuresed now on PO Lasix  Completed IV Unsayn  Now on Vapotherm, declining further    NSTEMI II  CAD with history of PCI  Demand in the setting of hypoxia  EKG was non-ischemic    Paroxysmal Atrial Fibrillation  Rate appears to be well controlled    Hyponatremia  Appeared secondary to over diuresis  Improved to 136    Thrombocytopenia  Stable, appears chronic    Non-oliguric CHARLEY, improving Cr  Likely due to Diuresis    Delirium due to medical condition  Underlying dementia    Plan    Lasix did not improve patient's symptoms and his creatinine worsened  Patient is a poor dialysis candidate  Unfortunately, he is continuing to decline   Repeat CXR pending  Numerous conversations with family; hospice and palliative care involved  Family has agreed to turn off defibrillator   No significant improvement. Continue SLP evaluation and treatment (not participating) Cardiology following. No significant response to IV solumedrol. Hold off on further steroids. Continue Plavix and Eliquis  Amiodarone and Coreg for rate control  Monitor renal function parameters  Hospice appropriate diagnosis include: Poor nutritional status, Dementia, Heart failure.   Please review note
Sit: Unable to assess  Bed Mobility Comments: pt desating to 80-85% vapotherm at 30L 100%. Was at 80 when therapy arrived with nursing completing bedside care.   Pt left on left with wedge, HOB raised per pt request        Cognition  Cognition Comment: Pt was able to follow directions, When asked why he was in the hospital he said, \"I don't know, maybe to die\"  He was able to ask OT/PT to assist with repositioning when uncomfortable  Orientation  Overall Orientation Status: Impaired  Orientation Level: Oriented to person;Oriented to place (pt with some difficulty answering questions, SOB)                  Education Given To: Patient  Education Provided: Role of Therapy;Plan of Care  Education Method: Verbal  Education Outcome: Verbalized understanding                                                                          AM-PAC Score        AM-PAC Inpatient Daily Activity Raw Score: 9 (05/23/23 1027)  AM-PAC Inpatient ADL T-Scale Score : 25.33 (05/23/23 1027)  ADL Inpatient CMS 0-100% Score: 79.59 (05/23/23 1027)  ADL Inpatient CMS G-Code Modifier : CL (05/23/23 1027)           Goals  Short Term Goals  Time Frame for Short Term Goals: by discharge  Short Term Goal 1: feed and groom with mod assist  Patient Goals   Patient goals : no specific goals stated       Therapy Time   Individual Concurrent Group Co-treatment   Time In 0920         Time Out 0945         Minutes 25         Timed Code Treatment Minutes: 10 Minutes (+15 min eval)       GLENN Sandy/L 21 
Lab Results   Component Value Date/Time    TSH 3.25 03/27/2019 05:25 AM     Troponin: No results found for: TROPONINT  Lactic Acid: No results for input(s): LACTA in the last 72 hours. BNP:   No results for input(s): PROBNP in the last 72 hours.     UA:  Lab Results   Component Value Date/Time    NITRU Negative 05/09/2023 03:05 PM    COLORU Yellow 05/09/2023 03:05 PM    PHUR 5.5 05/09/2023 03:05 PM    WBCUA 1 05/09/2023 03:05 PM    RBCUA 14 05/09/2023 03:05 PM    MUCUS Present 05/09/2023 03:05 PM    BACTERIA None Seen 05/09/2023 03:05 PM    CLARITYU Clear 05/09/2023 03:05 PM    SPECGRAV 1.008 05/09/2023 03:05 PM    LEUKOCYTESUR Negative 05/09/2023 03:05 PM    UROBILINOGEN 0.2 05/09/2023 03:05 PM    BILIRUBINUR Negative 05/09/2023 03:05 PM    BILIRUBINUR NEGATIVE 12/06/2011 10:35 AM    BLOODU MODERATE 05/09/2023 03:05 PM    GLUCOSEU Negative 05/09/2023 03:05 PM    GLUCOSEU NEGATIVE 12/06/2011 10:35 AM    KETUA Negative 05/09/2023 03:05 PM     Urine Cultures: No results found for: LABURIN  Blood Cultures: No results found for: BC  No results found for: BLOODCULT2  Organism: No results found for: [facility]      Electronically signed by Mariia Presley MD on 5/24/2023 at 11:23 AM
PHUR 5.5 05/09/2023 03:05 PM    WBCUA 1 05/09/2023 03:05 PM    RBCUA 14 05/09/2023 03:05 PM    MUCUS Present 05/09/2023 03:05 PM    BACTERIA None Seen 05/09/2023 03:05 PM    CLARITYU Clear 05/09/2023 03:05 PM    SPECGRAV 1.008 05/09/2023 03:05 PM    LEUKOCYTESUR Negative 05/09/2023 03:05 PM    UROBILINOGEN 0.2 05/09/2023 03:05 PM    BILIRUBINUR Negative 05/09/2023 03:05 PM    BILIRUBINUR NEGATIVE 12/06/2011 10:35 AM    BLOODU MODERATE 05/09/2023 03:05 PM    GLUCOSEU Negative 05/09/2023 03:05 PM    GLUCOSEU NEGATIVE 12/06/2011 10:35 AM    KETUA Negative 05/09/2023 03:05 PM     Urine Cultures: No results found for: Deepali Pain  Blood Cultures: No results found for: BC  No results found for: BLOODCULT2  Organism: No results found for: [facility]      Electronically signed by Hanny Martin MD on 5/22/2023 at 3:24 PM

## (undated) DEVICE — 3M™ COBAN™ NL STERILE NON-LATEX SELF-ADHERENT WRAP, 2084S, 4 IN X 5 YD (10 CM X 4,5 M), 18 ROLLS/CASE: Brand: 3M™ COBAN™

## (undated) DEVICE — ENDOSCOPY KIT: Brand: MEDLINE INDUSTRIES, INC.

## (undated) DEVICE — GUIDEPIN FIX L12IN DIA3.2MM TRCR TIP PART THRD FOR CANN S

## (undated) DEVICE — ELECTRODE PT RET AD L9FT HI MOIST COND ADH HYDRGEL CORDED

## (undated) DEVICE — PADDING UNDERCAST W4INXL4YD 100% COT CRIMPED FINISH WBRL II

## (undated) DEVICE — CHLORAPREP 26ML ORANGE

## (undated) DEVICE — DRAPE C ARM UNIV W41XL74IN CLR PLAS XR VELC CLSR POLY STRP

## (undated) DEVICE — INVIEW CLEAR LEGGINGS: Brand: CONVERTORS

## (undated) DEVICE — GLOVE,SURG,SENSICARE SLT,LF,PF,8.5: Brand: MEDLINE

## (undated) DEVICE — SPONGE GZ W4XL4IN COT 12 PLY TYP VII WVN C FLD DSGN

## (undated) DEVICE — SUTURE VCRL SZ 1 L27IN ABSRB UD CT-1 L36MM 1/2 CIR J261H

## (undated) DEVICE — 4-PORT MANIFOLD: Brand: NEPTUNE 2

## (undated) DEVICE — MAJOR SET UP PK

## (undated) DEVICE — KIT OR ROOM TURNOVER W/STRAP

## (undated) DEVICE — BIT DRL DIA5MM CANN QUIK CONN

## (undated) DEVICE — COVER LT HNDL BLU PLAS

## (undated) DEVICE — SUTURE ABSORBABLE BRAIDED 2-0 CT-1 27 IN UD VICRYL J259H

## (undated) DEVICE — SPONGE LAP W18XL18IN WHT COT 4 PLY FLD STRUNG RADPQ DISP ST

## (undated) DEVICE — CONTAINER SPEC 480ML CLR POLYSTYR 10% NEUT BUFF FRMLN ZN

## (undated) DEVICE — SHEET,DRAPE,53X77,STERILE: Brand: MEDLINE

## (undated) DEVICE — BITE BLK 60FR GRN ENDOSCP AD W STRP SLD DISPOSABLE

## (undated) DEVICE — 6619 2 PTNT ISO SYS INCISE AREA&LT;(&GT;&&LT;)&GT;P: Brand: STERI-DRAPE™ IOBAN™ 2

## (undated) DEVICE — SOLUTION IV IRRIG POUR BRL 0.9% SODIUM CHL 2F7124

## (undated) DEVICE — DRESSING,GAUZE,XEROFORM,CURAD,5"X9",ST: Brand: CURAD

## (undated) DEVICE — BITE BLOCK ENDOSCP AD 60 FR W/ ADJ STRP PLAS GRN BLOX

## (undated) DEVICE — GOWN,SIRUS,POLYRNF,BRTHSLV,XL,30/CS: Brand: MEDLINE

## (undated) DEVICE — STAPLER SKIN H3.9MM WIRE DIA0.58MM CRWN 6.9MM 35 STPL FIX

## (undated) DEVICE — GLOVE,SURG,SENSICARE SLT,LF,PF,8: Brand: MEDLINE

## (undated) DEVICE — FORCEPS BX 240CM 2.4MM L NDL RAD JAW 4 M00513334